# Patient Record
Sex: MALE | Race: WHITE | NOT HISPANIC OR LATINO | Employment: FULL TIME | URBAN - METROPOLITAN AREA
[De-identification: names, ages, dates, MRNs, and addresses within clinical notes are randomized per-mention and may not be internally consistent; named-entity substitution may affect disease eponyms.]

---

## 2018-01-04 ENCOUNTER — HOSPITAL ENCOUNTER (OUTPATIENT)
Dept: PREADMISSION TESTING | Facility: HOSPITAL | Age: 33
Discharge: HOME | End: 2018-01-04
Attending: PODIATRIST | Admitting: PODIATRIST

## 2018-01-04 LAB
ANION GAP SERPL CALC-SCNC: 12 MMOL/L (ref 10–20)
BASOPHILS # BLD AUTO: 0 10*3/UL (ref 0–0.2)
BASOPHILS NFR BLD AUTO: 1 % (ref 0–2)
BUN SERPL-MCNC: 20 MG/DL (ref 8–20)
BUN/CREAT SERPL: 18.2 (ref 6.2–20.3)
CALCIUM SERPL-MCNC: 9.6 MG/DL (ref 8.9–10.3)
CHLORIDE SERPL-SCNC: 103 MMOL/L (ref 101–111)
CONV CO2: 26 MMOL/L (ref 22–32)
CREAT UR-MCNC: 1.1 MG/DL (ref 0.7–1.2)
DIFFERENTIAL METHOD BLD: (no result)
EOSINOPHIL # BLD AUTO: 0.2 10*3/UL (ref 0–0.3)
EOSINOPHIL # BLD AUTO: 4 % (ref 0–3)
ERYTHROCYTE [DISTWIDTH] IN BLOOD BY AUTOMATED COUNT: 13.2 % (ref 11.5–14.5)
GLUCOSE SERPL-MCNC: 109 MG/DL (ref 65–99)
HCT VFR BLD AUTO: 43.8 % (ref 40–54)
HGB BLD-MCNC: 14.8 G/DL (ref 14–18)
LYMPHOCYTES # BLD AUTO: 2.8 10*3/UL (ref 0.8–4.8)
LYMPHOCYTES NFR BLD AUTO: 48 % (ref 18–42)
MCH RBC QN AUTO: 31.5 PG (ref 26–32)
MCHC RBC AUTO-ENTMCNC: 33.7 G/DL (ref 32–36)
MCV RBC AUTO: 93.5 FL (ref 80–94)
MONOCYTES # BLD AUTO: 0.6 10*3/UL (ref 0.1–1.3)
MONOCYTES NFR BLD AUTO: 10 % (ref 2–11)
NEUTROPHILS # BLD AUTO: 2.2 10*3/UL (ref 2.3–8.6)
NEUTROPHILS NFR BLD AUTO: 37 % (ref 50–75)
NRBC BLD AUTO-RTO: 0 /100{WBCS}
NRBC/RBC NFR BLD MANUAL: 0 10*3/UL
PLATELET # BLD AUTO: 244 10*3/UL (ref 150–450)
PMV BLD AUTO: 7.9 FL (ref 7.4–10.4)
POTASSIUM SERPL-SCNC: 4 MMOL/L (ref 3.6–5.1)
RBC # BLD AUTO: 4.69 10*6/UL (ref 4.6–6)
SODIUM SERPL-SCNC: 137 MMOL/L (ref 136–144)
WBC # BLD AUTO: 5.9 10*3/UL (ref 4.5–11.5)

## 2018-01-19 ENCOUNTER — HOSPITAL ENCOUNTER (OUTPATIENT)
Dept: OTHER | Facility: HOSPITAL | Age: 33
Setting detail: SPECIMEN
Discharge: HOME | End: 2018-01-19
Attending: PODIATRIST | Admitting: PODIATRIST

## 2018-03-12 ENCOUNTER — OFFICE (AMBULATORY)
Dept: URBAN - METROPOLITAN AREA CLINIC 64 | Facility: CLINIC | Age: 33
End: 2018-03-12

## 2018-03-12 VITALS
HEART RATE: 93 BPM | WEIGHT: 234 LBS | HEIGHT: 71 IN | DIASTOLIC BLOOD PRESSURE: 95 MMHG | SYSTOLIC BLOOD PRESSURE: 135 MMHG

## 2018-03-12 DIAGNOSIS — R13.10 DYSPHAGIA, UNSPECIFIED: ICD-10-CM

## 2018-03-12 DIAGNOSIS — K21.9 GASTRO-ESOPHAGEAL REFLUX DISEASE WITHOUT ESOPHAGITIS: ICD-10-CM

## 2018-03-12 PROCEDURE — 99203 OFFICE O/P NEW LOW 30 MIN: CPT | Performed by: INTERNAL MEDICINE

## 2018-03-15 ENCOUNTER — OFFICE (AMBULATORY)
Dept: URBAN - METROPOLITAN AREA PATHOLOGY 4 | Facility: PATHOLOGY | Age: 33
End: 2018-03-15

## 2018-03-15 ENCOUNTER — ON CAMPUS - OUTPATIENT (AMBULATORY)
Dept: URBAN - METROPOLITAN AREA HOSPITAL 2 | Facility: HOSPITAL | Age: 33
End: 2018-03-15

## 2018-03-15 VITALS
OXYGEN SATURATION: 97 % | DIASTOLIC BLOOD PRESSURE: 78 MMHG | SYSTOLIC BLOOD PRESSURE: 130 MMHG | OXYGEN SATURATION: 94 % | HEART RATE: 79 BPM | OXYGEN SATURATION: 100 % | HEART RATE: 87 BPM | HEART RATE: 103 BPM | TEMPERATURE: 98.3 F | HEART RATE: 77 BPM | OXYGEN SATURATION: 95 % | RESPIRATION RATE: 18 BRPM | SYSTOLIC BLOOD PRESSURE: 123 MMHG | HEART RATE: 96 BPM | DIASTOLIC BLOOD PRESSURE: 79 MMHG | WEIGHT: 234 LBS | DIASTOLIC BLOOD PRESSURE: 87 MMHG | SYSTOLIC BLOOD PRESSURE: 158 MMHG | DIASTOLIC BLOOD PRESSURE: 94 MMHG | HEART RATE: 98 BPM | SYSTOLIC BLOOD PRESSURE: 141 MMHG | SYSTOLIC BLOOD PRESSURE: 151 MMHG | RESPIRATION RATE: 16 BRPM | RESPIRATION RATE: 15 BRPM | DIASTOLIC BLOOD PRESSURE: 81 MMHG | SYSTOLIC BLOOD PRESSURE: 132 MMHG | HEIGHT: 71 IN

## 2018-03-15 DIAGNOSIS — R13.10 DYSPHAGIA, UNSPECIFIED: ICD-10-CM

## 2018-03-15 DIAGNOSIS — K44.9 DIAPHRAGMATIC HERNIA WITHOUT OBSTRUCTION OR GANGRENE: ICD-10-CM

## 2018-03-15 DIAGNOSIS — K21.0 GASTRO-ESOPHAGEAL REFLUX DISEASE WITH ESOPHAGITIS: ICD-10-CM

## 2018-03-15 DIAGNOSIS — D13.0 BENIGN NEOPLASM OF ESOPHAGUS: ICD-10-CM

## 2018-03-15 LAB
GI HISTOLOGY: A. UNSPECIFIED: (no result)
GI HISTOLOGY: PDF REPORT: (no result)

## 2018-03-15 PROCEDURE — 88305 TISSUE EXAM BY PATHOLOGIST: CPT | Performed by: INTERNAL MEDICINE

## 2018-03-15 PROCEDURE — 43450 DILATE ESOPHAGUS 1/MULT PASS: CPT | Performed by: INTERNAL MEDICINE

## 2018-03-15 PROCEDURE — 43239 EGD BIOPSY SINGLE/MULTIPLE: CPT | Performed by: INTERNAL MEDICINE

## 2018-03-15 RX ORDER — OMEPRAZOLE 40 MG/1
40 CAPSULE, DELAYED RELEASE ORAL
Qty: 180 | Refills: 4 | Status: ACTIVE
Start: 2018-03-15

## 2018-03-15 RX ADMIN — PROPOFOL: 10 INJECTION, EMULSION INTRAVENOUS at 13:30

## 2019-11-27 ENCOUNTER — OFFICE VISIT (OUTPATIENT)
Dept: SLEEP MEDICINE | Facility: HOSPITAL | Age: 34
End: 2019-11-27

## 2019-11-27 VITALS
WEIGHT: 240.6 LBS | OXYGEN SATURATION: 96 % | HEIGHT: 71 IN | DIASTOLIC BLOOD PRESSURE: 100 MMHG | SYSTOLIC BLOOD PRESSURE: 148 MMHG | BODY MASS INDEX: 33.68 KG/M2 | HEART RATE: 90 BPM

## 2019-11-27 DIAGNOSIS — G47.33 OBSTRUCTIVE SLEEP APNEA, ADULT: Primary | ICD-10-CM

## 2019-11-27 PROCEDURE — G0463 HOSPITAL OUTPT CLINIC VISIT: HCPCS

## 2019-11-27 RX ORDER — OMEPRAZOLE 40 MG/1
40 CAPSULE, DELAYED RELEASE ORAL DAILY PRN
COMMUNITY

## 2019-11-27 NOTE — PROGRESS NOTES
SLEEP MEDICINE CONSULT      Patient Identification:     Name: Caleb Vargas   Age: 34 y.o.   Gender: male   : 1985   MRN: [id number]     Date of consult: 2019    PCP/Referring Physician(s):     PCP: Eduar Rojas  Referring Provider: Minerva Hobson MD      Chief Complaint:   Obstructive sleep apnea.  Remains symptomatic.    History of Present Illness:   This is a very pleasant gentleman who was diagnosed with obstructive sleep apnea after undergoing an unattended polysomnogram some 2 years ago.  Struct of sleep apnea was diagnosed.  He was given a CPAP set at 12 cm of CPAP pressure.  He found the pressure intolerable and gave it up.  He has remained symptomatic.  He has continued to gain weight.  He has felt tired and somnolent during the daytime.  He has felt exhausted at the end of the day.  He tried mouthguard to decrease his nocturnal symptoms.  He found it very ineffective.  He discussed his symptoms with his primary care physician.  He has been referred to sleep center for further management    He used a full facemask which he did not like.  The pressures were too high for his liking.  He was evaluated by ENT in [place].  Deviated nasal septum was diagnosed.  Surgery was offered.    He works night shift 3 nights per week.  His bedtime is usually between 8 AM to 9 AM.  He dozes off within 15 minutes.  Usually wakes up at 3:30 PM to start his day.  He has woken up 3 or 4 times a bathroom break.  He has often woken up with a dry mouth.  He has woken up with his loud snores.  Takes medications for heartburns.  He finds it difficult to breathe through his nose.  He has severe nasal congestion upon awakening.    During his off days which is from Monday to Thursday he goes to bed between 11 PM to 3 AM.  Dozes off within 15 minutes.  Sometimes may take him a longer time.  Not more than 35 minutes.  His sleep remains disturbed.  He continues to wake up throughout the night.  He has sometimes woken up  with shortness of breath.  His loud snores and sometimes disturbs his sleep.  He has not read in his sleep.  He has woken up fighting for air.  Dry mouth has bothered him.  He is a mouth breather.  Sometimes headaches in the morning.  He denies any symptoms of heartburn after he started taking medications for reflux disease.  No symptoms of palpitations at night or in the morning.    He has woken up in the morning tired and somnolent.  He has remained tired throughout the day.  He likes to have Mountain Dew 1 or 2 cans during the daytime.  Or at night when working.  He has dozed off during sedentary activities.  His [place] sleepiness score is 12/24    Allergies, Medications, and Past History:   Allergies:    Allergies   Allergen Reactions   • Latex Rash     Current Medications:    Prior to Admission medications    Medication Sig Start Date End Date Taking? Authorizing Provider   omeprazole (priLOSEC) 20 MG capsule Take 20 mg by mouth Daily.   Yes Provider, MD Cedric     Past Medical History:    Past Medical History:   Diagnosis Date   • Abdominal pain    • Agitation    • Allergies    • Apnea    • Change in weight    • Chest congestion    • Choking    • Cough    • GERD (gastroesophageal reflux disease)    • High blood pressure    • Obesity    • Sleep disturbance    • Snoring    • Wheezing       Past Surgical History:    Past Surgical History:   Procedure Laterality Date   • CLEFT PALATE REPAIR     • SINUS SURGERY     • TONSILLECTOMY        Social History:    Social History     Tobacco Use   • Smoking status: Current Every Day Smoker   • Smokeless tobacco: Never Used   Substance Use Topics   • Alcohol use: Not on file   • Drug use: Not on file     Family Medical History:  History reviewed. No pertinent family history.      Review of Systems:   Constitutional:  Denies fever or chills.  He continues to gain weight .  He has gained 70 pounds in last 5 years.  Eyes:  Denies change in visual acuity   HENT:   He has  "nasal congestion, sore throat or post nasal drainage he has deviated nasal septum.  He has never been tested for allergies.  Respiratory:  Denies cough, shortness of breath .  He experiences dyspnea on exertion    Cardiovascular:  Denies chest pain or edema   GI:  Denies abdominal pain, nausea, vomiting, bloody stools or diarrhea   :  Denies dysuria or nocturia   Musculoskeletal:  Denies back pain or joint pain   Integument:  Denies rash   Neurologic:  Denies headache, focal weakness or sensory changes. No history of stroke or seizures.   Endocrine:  Denies polyuria or polydipsia   Lymphatic:  Denies swollen glands   Psychiatric:  Denies depression, anxiety or claustrophobia      Physical Exam:     Vitals:    11/27/19 1142   BP: 148/100   Pulse: 90   SpO2: 96%   Weight: 109 kg (240 lb 9.6 oz)   Height: 180.3 cm (71\")     HEENT: Head is normocephalic, atraumatic, normal distribution of hair. Pupils reactive to light. Ocular movements intact.  Moderate nasal congestion on sniff test. He has deviated nasal septum. No micrognathia.  Throat: Mallapatti stage 4, tongue midline, mucosa moist.  Neck: no JVD, thyromegaly, mass, +midline trachea, Neck circumference 16.5 inches  Lungs: clear, no wheeze, rhonchi, crackles  Cardiovascular: S1, S2, RRR no murmurs, rubs or gallops  Abd: +BS's soft NT/ND, no CVA tenderness.  Obese.   Ext: no edema, cyanosis, clubbing, pulses intact  Neuro: Awake alert, oriented to time place and person. Grossly intact to motor, sensory cerebral, and cerebellar (without focal deficit)  Psych: No overt melanie, depression, psychosis or inappropriate behavior  Skin: No rash, cellulitis, or ulcerations.  No facial rash or erosions      Assessment/Plan:   Obstructive sleep apnea:  This is a very pleasant gentleman who has been diagnosed with obstructive sleep apnea after an unattended polysomnogram.  He was unable to tolerate a CPAP mode of therapy.  He has gained weight in the meantime.  He has become " more symptomatic.  Recommendation.  a split-night polysomnogram is recommended.  Allergic rhinitis:  He remains symptomatic.  He has deviated nasal septum.  Is a mouth breather  Recommendation.  Aggressive therapy for upper airway congestion and deviated nasal septum is recommended.  He may benefit from ENT evaluation.  Allergy testing may be helpful.  Obesity:  Based on BMI of 33.6.  Recommendation.  Weight loss is recommended.  Shift work associated with insufficient sleep syndrome.  He is advised to sleep at least 7 to 8 hours per major sleep.  He is advised to fix his bedtime as well as rise time on his days off.  He is advised to take a short nap prior to his shift.  He is advised to avoid all alcoholic beverages or caffeinated beverages 4 to 6 hours prior to bedtime.  Driving instructions. Patient is advised to avoid driving if tired or somnolent. To avoid all alcoholic beverages or medications causing somnolence.         Diagnosis Plan   1. Obstructive sleep apnea, adult  Polysomnography 4 or More Parameters     No orders of the defined types were placed in this encounter.    Orders Placed This Encounter   Procedures   • Polysomnography 4 or More Parameters     Hx. Of REGGIE   SEVERE NASAL CONGESTION.  REQUIRES SPLIT NIGHT.     Standing Status:   Future     Standing Expiration Date:   11/27/2020     Order Specific Question:   May take own meds     Answer:   Yes     Order Specific Question:   Details     Answer:   O2 Implementation per Protocol       This document has been electronically signed by:  Minerva Hobson MD  11/27/2019  12:45 PM

## 2019-12-18 ENCOUNTER — HOSPITAL ENCOUNTER (OUTPATIENT)
Dept: SLEEP MEDICINE | Facility: HOSPITAL | Age: 34
Discharge: HOME | End: 2019-12-18
Admitting: INTERNAL MEDICINE

## 2019-12-18 DIAGNOSIS — G47.33 OBSTRUCTIVE SLEEP APNEA, ADULT: ICD-10-CM

## 2019-12-18 PROCEDURE — 95811 POLYSOM 6/>YRS CPAP 4/> PARM: CPT

## 2019-12-19 DIAGNOSIS — G47.33 OBSTRUCTIVE SLEEP APNEA, ADULT: Primary | ICD-10-CM

## 2019-12-28 ENCOUNTER — HOSPITAL ENCOUNTER (EMERGENCY)
Facility: HOSPITAL | Age: 34
Discharge: HOME | End: 2019-12-28
Admitting: EMERGENCY MEDICINE

## 2019-12-28 ENCOUNTER — APPOINTMENT (OUTPATIENT)
Dept: GENERAL RADIOLOGY | Facility: HOSPITAL | Age: 34
End: 2019-12-28

## 2019-12-28 VITALS
WEIGHT: 236.55 LBS | BODY MASS INDEX: 33.12 KG/M2 | HEART RATE: 98 BPM | TEMPERATURE: 97.9 F | SYSTOLIC BLOOD PRESSURE: 136 MMHG | DIASTOLIC BLOOD PRESSURE: 78 MMHG | HEIGHT: 71 IN | OXYGEN SATURATION: 99 % | RESPIRATION RATE: 18 BRPM

## 2019-12-28 DIAGNOSIS — J10.1 INFLUENZA A: Primary | ICD-10-CM

## 2019-12-28 DIAGNOSIS — R05.9 COUGH: ICD-10-CM

## 2019-12-28 DIAGNOSIS — R50.9 FEVER, UNSPECIFIED FEVER CAUSE: ICD-10-CM

## 2019-12-28 LAB
ALBUMIN SERPL-MCNC: 4.3 G/DL (ref 3.5–5.2)
ALBUMIN/GLOB SERPL: 1.2 G/DL
ALP SERPL-CCNC: 77 U/L (ref 39–117)
ALT SERPL W P-5'-P-CCNC: 51 U/L (ref 1–41)
ANION GAP SERPL CALCULATED.3IONS-SCNC: 12 MMOL/L (ref 5–15)
AST SERPL-CCNC: 30 U/L (ref 1–40)
BASOPHILS # BLD AUTO: 0 10*3/MM3 (ref 0–0.2)
BASOPHILS NFR BLD AUTO: 0.4 % (ref 0–1.5)
BILIRUB SERPL-MCNC: 0.3 MG/DL (ref 0.2–1.2)
BUN BLD-MCNC: 15 MG/DL (ref 6–20)
BUN/CREAT SERPL: 15.6 (ref 7–25)
CALCIUM SPEC-SCNC: 8.9 MG/DL (ref 8.6–10.5)
CHLORIDE SERPL-SCNC: 101 MMOL/L (ref 98–107)
CO2 SERPL-SCNC: 23 MMOL/L (ref 22–29)
CREAT BLD-MCNC: 0.96 MG/DL (ref 0.76–1.27)
D DIMER PPP FEU-MCNC: 0.27 MCGFEU/ML (ref 0.17–0.59)
DEPRECATED RDW RBC AUTO: 42 FL (ref 37–54)
EOSINOPHIL # BLD AUTO: 0 10*3/MM3 (ref 0–0.4)
EOSINOPHIL NFR BLD AUTO: 0.3 % (ref 0.3–6.2)
ERYTHROCYTE [DISTWIDTH] IN BLOOD BY AUTOMATED COUNT: 13.2 % (ref 12.3–15.4)
FLUAV SUBTYP SPEC NAA+PROBE: DETECTED
FLUBV RNA ISLT QL NAA+PROBE: NOT DETECTED
GFR SERPL CREATININE-BSD FRML MDRD: 89 ML/MIN/1.73
GLOBULIN UR ELPH-MCNC: 3.6 GM/DL
GLUCOSE BLD-MCNC: 118 MG/DL (ref 65–99)
HCT VFR BLD AUTO: 45.1 % (ref 37.5–51)
HGB BLD-MCNC: 15.6 G/DL (ref 13–17.7)
LYMPHOCYTES # BLD AUTO: 0.9 10*3/MM3 (ref 0.7–3.1)
LYMPHOCYTES NFR BLD AUTO: 18 % (ref 19.6–45.3)
MCH RBC QN AUTO: 31.3 PG (ref 26.6–33)
MCHC RBC AUTO-ENTMCNC: 34.6 G/DL (ref 31.5–35.7)
MCV RBC AUTO: 90.4 FL (ref 79–97)
MONOCYTES # BLD AUTO: 0.9 10*3/MM3 (ref 0.1–0.9)
MONOCYTES NFR BLD AUTO: 17.6 % (ref 5–12)
NEUTROPHILS # BLD AUTO: 3.3 10*3/MM3 (ref 1.7–7)
NEUTROPHILS NFR BLD AUTO: 63.7 % (ref 42.7–76)
NRBC BLD AUTO-RTO: 0 /100 WBC (ref 0–0.2)
NT-PROBNP SERPL-MCNC: <5 PG/ML (ref 5–450)
PLATELET # BLD AUTO: 211 10*3/MM3 (ref 140–450)
PMV BLD AUTO: 7.2 FL (ref 6–12)
POTASSIUM BLD-SCNC: 4.3 MMOL/L (ref 3.5–5.2)
PROT SERPL-MCNC: 7.9 G/DL (ref 6–8.5)
RBC # BLD AUTO: 4.99 10*6/MM3 (ref 4.14–5.8)
SODIUM BLD-SCNC: 136 MMOL/L (ref 136–145)
TROPONIN T SERPL-MCNC: <0.01 NG/ML (ref 0–0.03)
WBC NRBC COR # BLD: 5.2 10*3/MM3 (ref 3.4–10.8)

## 2019-12-28 PROCEDURE — 85379 FIBRIN DEGRADATION QUANT: CPT | Performed by: PHYSICIAN ASSISTANT

## 2019-12-28 PROCEDURE — 85025 COMPLETE CBC W/AUTO DIFF WBC: CPT | Performed by: PHYSICIAN ASSISTANT

## 2019-12-28 PROCEDURE — 87502 INFLUENZA DNA AMP PROBE: CPT | Performed by: PHYSICIAN ASSISTANT

## 2019-12-28 PROCEDURE — 99284 EMERGENCY DEPT VISIT MOD MDM: CPT

## 2019-12-28 PROCEDURE — 83880 ASSAY OF NATRIURETIC PEPTIDE: CPT | Performed by: PHYSICIAN ASSISTANT

## 2019-12-28 PROCEDURE — 84484 ASSAY OF TROPONIN QUANT: CPT | Performed by: PHYSICIAN ASSISTANT

## 2019-12-28 PROCEDURE — 80053 COMPREHEN METABOLIC PANEL: CPT | Performed by: PHYSICIAN ASSISTANT

## 2019-12-28 PROCEDURE — 93005 ELECTROCARDIOGRAM TRACING: CPT | Performed by: EMERGENCY MEDICINE

## 2019-12-28 PROCEDURE — 71046 X-RAY EXAM CHEST 2 VIEWS: CPT

## 2019-12-28 PROCEDURE — 94640 AIRWAY INHALATION TREATMENT: CPT

## 2019-12-28 RX ORDER — IPRATROPIUM BROMIDE AND ALBUTEROL SULFATE 2.5; .5 MG/3ML; MG/3ML
3 SOLUTION RESPIRATORY (INHALATION) ONCE
Status: COMPLETED | OUTPATIENT
Start: 2019-12-28 | End: 2019-12-28

## 2019-12-28 RX ORDER — SODIUM CHLORIDE 0.9 % (FLUSH) 0.9 %
10 SYRINGE (ML) INJECTION AS NEEDED
Status: DISCONTINUED | OUTPATIENT
Start: 2019-12-28 | End: 2019-12-28 | Stop reason: HOSPADM

## 2019-12-28 RX ORDER — ALBUTEROL SULFATE 90 UG/1
2 AEROSOL, METERED RESPIRATORY (INHALATION) EVERY 4 HOURS PRN
Qty: 1 INHALER | Refills: 0 | Status: ON HOLD | OUTPATIENT
Start: 2019-12-28 | End: 2022-05-28

## 2019-12-28 RX ORDER — BENZONATATE 200 MG/1
200 CAPSULE ORAL 3 TIMES DAILY PRN
Qty: 30 CAPSULE | Refills: 0 | Status: SHIPPED | OUTPATIENT
Start: 2019-12-28 | End: 2020-11-20

## 2019-12-28 RX ORDER — BROMPHENIRAMINE MALEATE, PSEUDOEPHEDRINE HYDROCHLORIDE, AND DEXTROMETHORPHAN HYDROBROMIDE 2; 30; 10 MG/5ML; MG/5ML; MG/5ML
5 SYRUP ORAL 4 TIMES DAILY PRN
Qty: 473 ML | Refills: 0 | Status: SHIPPED | OUTPATIENT
Start: 2019-12-28 | End: 2020-11-20

## 2019-12-28 RX ADMIN — IPRATROPIUM BROMIDE AND ALBUTEROL SULFATE 3 ML: .5; 3 SOLUTION RESPIRATORY (INHALATION) at 13:30

## 2020-03-11 ENCOUNTER — OFFICE VISIT (OUTPATIENT)
Dept: SLEEP MEDICINE | Facility: HOSPITAL | Age: 35
End: 2020-03-11

## 2020-03-11 VITALS
SYSTOLIC BLOOD PRESSURE: 144 MMHG | HEART RATE: 104 BPM | BODY MASS INDEX: 33.6 KG/M2 | HEIGHT: 71 IN | DIASTOLIC BLOOD PRESSURE: 95 MMHG | WEIGHT: 240 LBS | OXYGEN SATURATION: 96 %

## 2020-03-11 DIAGNOSIS — G47.33 OBSTRUCTIVE SLEEP APNEA, ADULT: Primary | ICD-10-CM

## 2020-03-11 PROCEDURE — G0463 HOSPITAL OUTPT CLINIC VISIT: HCPCS

## 2020-03-11 NOTE — PROGRESS NOTES
SLEEP MEDICINE CONSULT      Patient Identification:     Name: Caleb Vargas   Age: 34 y.o.   Gender: male   : 1985   MRN: [id number]     Date of consult: 3/11/2020    PCP/Referring Physician(s):     PCP: Faraz Lemus NP-C  Referring Provider: Minerva Hobson MD      Chief Complaint:   Obstructive sleep apnea.  10 weeks follow up for compliance.    History of Present Illness:   This is a very pleasant gentleman who was diagnosed with obstructive sleep apnea after  an unattended polysomnogram some 2 years ago.  Obsttructive of sleep apnea was diagnosed.  He was given a CPAP set at 12 cm of CPAP pressure.  He found the pressure intolerable and gave it up.  He has remained symptomatic.  He has continued to gain weight.    A  split night polysomnogram was done.  Severe sleep apnea was diagnosed associated with severe hypoxemia.  Respiratory events were controlled and given pressures of CPAP.  He was given a full facemask with a CPAP pressure set at 11 CWP.  He is here today for initial follow-up for compliance.  Memory card has been downloaded.    He used a full facemask Nicole view which he finds quite uncomfortable.  He has pulled the mask off in his sleep.  The pressures were too high for his comfort  He has used the humidifier on regular basis..    He works night shift 3 nights per week.  His bedtime is usually between 8 AM to 9 AM.  He dozes off within 15 minutes.  Usually wakes up at 3:30 PM to start his day.  He has woken up 3 or 4 times a bathroom break.  He has often woken up with a dry mouth.  He has woken up with his loud snores.  Takes medications for heartburns.  He finds it difficult to breathe through his nose.  He has severe nasal congestion upon awakening.    During his off days which is from Monday to Thursday he goes to bed between 11 PM to 3 AM.  Dozes off within 15 minutes or  longer time.  Not more than 35 minutes.  His sleep remains disturbed.  He continues to wake up throughout the  night.  He has sometimes woken up with shortness of breath.  His loud snores and sometimes disturbs his sleep.    He has woken up fighting for air.  Dry mouth has bothered him.  He is a mouth breather.  He has pulled his mask off in his sleep.  Sometimes headaches in the morning.  He denies any symptoms of heartburn after he started taking medications for reflux disease.  No symptoms of palpitations at night or in the morning.    He has woken up in the morning tired and somnolent.  He has remained tired throughout the day.  He likes to have Mountain Dew 1 or 2 cans during the daytime.  Or at night when working.  He has dozed off during sedentary activities.  His [place] sleepiness score is 12/24    Allergies, Medications, and Past History:   Allergies:    Allergies   Allergen Reactions   • Latex Hives   • Latex Rash     Current Medications:    Prior to Admission medications    Medication Sig Start Date End Date Taking? Authorizing Provider   omeprazole (priLOSEC) 20 MG capsule Take 20 mg by mouth Daily.   Yes Provider, MD Cedric     Past Medical History:    Past Medical History:   Diagnosis Date   • Abdominal pain    • Agitation    • Allergies    • Apnea    • Change in weight    • Chest congestion    • Choking    • Cough    • GERD (gastroesophageal reflux disease)    • High blood pressure    • Obesity    • Sleep disturbance    • Snoring    • Wheezing       Past Surgical History:    Past Surgical History:   Procedure Laterality Date   • CLEFT PALATE REPAIR     • SINUS SURGERY     • TONSILLECTOMY        Social History:    Social History     Tobacco Use   • Smoking status: Current Every Day Smoker   • Smokeless tobacco: Never Used   Substance Use Topics   • Alcohol use: Not on file   • Drug use: Not on file     Family Medical History:  History reviewed. No pertinent family history.      Review of Systems:   Constitutional:  Denies fever or chills.  He continues to gain weight .  He has gained 70 pounds in last 5  "years.  Eyes:  Denies change in visual acuity   HENT:   He has nasal congestion, sore throat or post nasal drainage he has deviated nasal septum.  He has never been tested for allergies.  He was operated upon at the age of 12.  A metal plate was placed over his maxillary bone.  He has deviated nasal septum.  He was offered surgical correction in [place] several years ago.  He has refused.  Respiratory:  Denies cough, shortness of breath .  He experiences dyspnea on exertion    Cardiovascular:  Denies chest pain or edema   GI:  Denies abdominal pain, nausea, vomiting, bloody stools or diarrhea .  No aerophagia.  :  Denies dysuria or nocturia   Musculoskeletal:  Denies back pain or joint pain   Integument:  Denies rash   Neurologic:  Denies headache, focal weakness or sensory changes. No history of stroke or seizures.   Endocrine:  Denies polyuria or polydipsia   Lymphatic:  Denies swollen glands   Psychiatric:  Denies depression, anxiety or claustrophobia      Physical Exam:     Vitals:    03/11/20 1332   BP: 144/95   Pulse: 104   SpO2: 96%   Weight: 109 kg (240 lb)   Height: 180.3 cm (71\")     HEENT: Head is normocephalic, atraumatic, normal distribution of hair. Pupils reactive to light. Ocular movements intact.  Moderate nasal congestion on sniff test. He has deviated nasal septum. No micrognathia.  Throat: Mallapatti stage 4, tongue midline, mucosa moist.  Neck: no JVD, thyromegaly, mass, +midline trachea, Neck circumference 16.5 inches  Lungs: clear, no wheeze, rhonchi, crackles  Cardiovascular: S1, S2, RRR no murmurs, rubs or gallops  Abd: +BS's soft NT/ND, no CVA tenderness.  Obese.   Ext: no edema, cyanosis, clubbing, pulses intact  Neuro: Awake alert, oriented to time place and person. Grossly intact to motor, sensory cerebral, and cerebellar (without focal deficit)  Psych: No overt melanie, depression, psychosis or inappropriate behavior  Skin: No rash, cellulitis, or ulcerations.  No facial rash or " erosions    Diagnostic data;  1.  I split-night polysomnogram was done on 12/18/2020.  The diagnostic portion of the study showed an AHI of 72.4 events per hour.  RDI of 74.8 events per hour.  Oxygen saturation monitoring shows a mean oxygen saturation of 93%.  Minimum oxygen saturation of 73%.  The treatment portion of the study showed an AHI of 3.9 events per hour.  RDI of 4.4 events per hour.  Oxygen saturation showed a mean saturation of 96%.  Minimum oxygen saturation of 90%.  Perineal movements were noted with an index of 34.9 events per hour.  Respiratory events were controlled at CPAP mode of therapy with a pressure at 11 CWP.  2.  Memory card download shows data from 12/26/2019 to 3/10/2020.  Overall 76 days of data reviewed.  66 days without  use.  Percentage of use 13.2%.  Maximum hours used 2 hours 56 minutes.  Minimum time use 1 minute.  100% of the time the mask has been used for less than 4 hours.  The average apnea hypopnea index is 12.9 events per hour at 11 CWP.  Assessment/Plan:   Obstructive sleep apnea:  This is a very pleasant gentleman who has been diagnosed with obstructive sleep apnea after an unattended polysomnogram.  He was unable to tolerate a CPAP mode of therapy.   Repeat split-night polysomnogram was done which showed presence of severe obstructive sleep apnea associated with severe hypoxemia.  He was provided with a CPAP mode of therapy set at 11 CWP with a full facemask.      He has found it difficult to use the mask for longer.  Of time.  He has pulled the mask off in his sleep.  He is a mouth breather.  He finds the pressure quite uncomfortable.  He has not gotten any benefit from it.    The results of memory card download was discussed in detail with the patient all questions were answered.    Different options of masks discussed.  He would prefer oral mask.  He was shown a FISSURE PYKEL oracle  mask.  He would like to give it a try.  Recommendation.  [facility]   [place]  oral mask.  To decrease the CPAP  pressure to 8 CWP.  He is advised to continue using the mask with given pressures on a nightly basis.  Is advised to use the mask with given pressure for at least 4 hours a minimum benefit or as long as he sleeps on maximum benefit.  Is advised to use the mask with given pressure during the daytime if he takes a nap.  Allergic rhinitis:  He remains symptomatic.  He has deviated nasal septum.  Is a mouth breather.  He has a metal plate for maxillary bone.  As per patient it has burrowed into his sinuses.  Recommendation.  Aggressive therapy for upper airway congestion and deviated nasal septum is recommended.  He may benefit from ENT evaluation.  Allergy testing may be helpful.  Obesity:  Based on BMI of 33.6.  Recommendation.  Weight loss is recommended.  Shift work associated with insufficient sleep syndrome.  He is advised to sleep at least 7 to 8 hours per major sleep.  He is advised to fix his bedtime as well as rise time on his days off.  He is advised to take a short nap prior to his shift.  He is advised to avoid all alcoholic beverages or caffeinated beverages 4 to 6 hours prior to bedtime.  Driving instructions. Patient is advised to avoid driving if tired or somnolent. To avoid all alcoholic beverages or medications causing somnolence.         Diagnosis Plan   1. Obstructive sleep apnea, adult  CPAP Therapy     No orders of the defined types were placed in this encounter.    Orders Placed This Encounter   Procedures   • CPAP Therapy     PLEASE PROVIDE AN ORAL MASK ONLY.  F&P  ORAL  ORAL MASK.     Order Specific Question:   Equipment:     Answer:    CPAP     Order Specific Question:   Pressure (cmH20)     Answer:   8     Order Specific Question:   Ramp:     Answer:   Yes     Order Specific Question:   Minutes:     Answer:   20     Order Specific Question:   Pressure (cmH20):     Answer:   4     Order Specific Question:   PAP Tubing (1 per 3 months)      Answer:    6' Tubing with integrated heating element     Order Specific Question:   PAP Mask (1 per 3 months)     Answer:   Mask Fitting with DME Company     Order Specific Question:   PAP Accessories     Answer:    Water Chamber for PAP Humidifier (1 per 6 month) &  Filter PAP Disposable (2 per month) &  Filter PAP Non-Disposable (1 per 6 months) &  Chinstrap to be used with PAP (1 per 6 months) &  Headgear to be used with PAP (1 per 6 mo)     Order Specific Question:   The prescribed settings for the PAP ordered are     Answer:   to decrease CPAP pressure to 8 cwp for acclimitization.     Order Specific Question:   Does the patient have Obstructive Sleep Apnea or other qualifying diagnosis for PAP ordered?     Answer:   Yes     Order Specific Question:   Does the patient require humidification?     Answer:   Yes     Order Specific Question:   Humidifier     Answer:    Humidifier Heated for CPAP or BiPAP     Order Specific Question:   If ordering a BiPAP for REGGIE a CPAP has been tried and/or ruled out?     Answer:   Does not apply     Order Specific Question:   The patient requires a PAP Device & continued use of Supplies to administer effective PAP therapy at the frequency of use ordered above     Answer:   Yes     Order Specific Question:   Initial Supplies and refills authorized for 12 months?     Answer:   Yes     Order Specific Question:   The face to face evaluation was performed on     Answer:   3/11/2020     Order Specific Question:   Which DME company is this being sent to?     Answer:   [facility] [[id number]]     Order Specific Question:   Length of Need (99 Months = Lifetime)     Answer:   99 Months = Lifetime       This document has been electronically signed by:  Minerva Hobson MD  3/11/2020  14:34

## 2020-04-27 ENCOUNTER — TRANSCRIBE ORDERS (OUTPATIENT)
Dept: LAB | Facility: HOSPITAL | Age: 35
End: 2020-04-27

## 2020-04-27 ENCOUNTER — LAB (OUTPATIENT)
Dept: LAB | Facility: HOSPITAL | Age: 35
End: 2020-04-27

## 2020-04-27 DIAGNOSIS — Z01.818 PRE-OP EXAMINATION: Primary | ICD-10-CM

## 2020-04-27 DIAGNOSIS — Z01.818 PRE-OP EXAMINATION: ICD-10-CM

## 2020-04-27 LAB
ANION GAP SERPL CALCULATED.3IONS-SCNC: 14.6 MMOL/L (ref 5–15)
BASOPHILS # BLD AUTO: 0.03 10*3/MM3 (ref 0–0.2)
BASOPHILS NFR BLD AUTO: 0.3 % (ref 0–1.5)
BUN BLD-MCNC: 20 MG/DL (ref 6–20)
BUN/CREAT SERPL: 23.3 (ref 7–25)
CALCIUM SPEC-SCNC: 9.3 MG/DL (ref 8.6–10.5)
CHLORIDE SERPL-SCNC: 101 MMOL/L (ref 98–107)
CO2 SERPL-SCNC: 23.4 MMOL/L (ref 22–29)
CREAT BLD-MCNC: 0.86 MG/DL (ref 0.76–1.27)
DEPRECATED RDW RBC AUTO: 42.9 FL (ref 37–54)
EOSINOPHIL # BLD AUTO: 0.06 10*3/MM3 (ref 0–0.4)
EOSINOPHIL NFR BLD AUTO: 0.6 % (ref 0.3–6.2)
ERYTHROCYTE [DISTWIDTH] IN BLOOD BY AUTOMATED COUNT: 12.9 % (ref 12.3–15.4)
GFR SERPL CREATININE-BSD FRML MDRD: 101 ML/MIN/1.73
GLUCOSE BLD-MCNC: 136 MG/DL (ref 65–99)
HCT VFR BLD AUTO: 42.5 % (ref 37.5–51)
HGB BLD-MCNC: 14.5 G/DL (ref 13–17.7)
IMM GRANULOCYTES # BLD AUTO: 0.04 10*3/MM3 (ref 0–0.05)
IMM GRANULOCYTES NFR BLD AUTO: 0.4 % (ref 0–0.5)
LYMPHOCYTES # BLD AUTO: 4.16 10*3/MM3 (ref 0.7–3.1)
LYMPHOCYTES NFR BLD AUTO: 39.9 % (ref 19.6–45.3)
MCH RBC QN AUTO: 31.2 PG (ref 26.6–33)
MCHC RBC AUTO-ENTMCNC: 34.1 G/DL (ref 31.5–35.7)
MCV RBC AUTO: 91.4 FL (ref 79–97)
MONOCYTES # BLD AUTO: 0.95 10*3/MM3 (ref 0.1–0.9)
MONOCYTES NFR BLD AUTO: 9.1 % (ref 5–12)
NEUTROPHILS # BLD AUTO: 5.19 10*3/MM3 (ref 1.7–7)
NEUTROPHILS NFR BLD AUTO: 49.7 % (ref 42.7–76)
NRBC BLD AUTO-RTO: 0 /100 WBC (ref 0–0.2)
PLATELET # BLD AUTO: 300 10*3/MM3 (ref 140–450)
PMV BLD AUTO: 10.3 FL (ref 6–12)
POTASSIUM BLD-SCNC: 3.9 MMOL/L (ref 3.5–5.2)
RBC # BLD AUTO: 4.65 10*6/MM3 (ref 4.14–5.8)
SODIUM BLD-SCNC: 139 MMOL/L (ref 136–145)
WBC NRBC COR # BLD: 10.43 10*3/MM3 (ref 3.4–10.8)

## 2020-04-27 PROCEDURE — 80048 BASIC METABOLIC PNL TOTAL CA: CPT

## 2020-04-27 PROCEDURE — 36415 COLL VENOUS BLD VENIPUNCTURE: CPT

## 2020-04-27 PROCEDURE — 85025 COMPLETE CBC W/AUTO DIFF WBC: CPT

## 2020-05-05 ENCOUNTER — LAB REQUISITION (OUTPATIENT)
Dept: LAB | Facility: HOSPITAL | Age: 35
End: 2020-05-05

## 2020-05-05 DIAGNOSIS — B07.0 PLANTAR WART: ICD-10-CM

## 2020-05-05 DIAGNOSIS — M79.671 PAIN IN RIGHT FOOT: ICD-10-CM

## 2020-05-05 PROCEDURE — 88305 TISSUE EXAM BY PATHOLOGIST: CPT | Performed by: PODIATRIST

## 2020-05-06 LAB
LAB AP CASE REPORT: NORMAL
PATH REPORT.FINAL DX SPEC: NORMAL
PATH REPORT.GROSS SPEC: NORMAL

## 2020-06-10 ENCOUNTER — APPOINTMENT (OUTPATIENT)
Dept: SLEEP MEDICINE | Facility: HOSPITAL | Age: 35
End: 2020-06-10

## 2020-10-14 DIAGNOSIS — G47.33 OBSTRUCTIVE SLEEP APNEA, ADULT: Primary | ICD-10-CM

## 2020-11-19 ENCOUNTER — HOSPITAL ENCOUNTER (OUTPATIENT)
Facility: HOSPITAL | Age: 35
Setting detail: OBSERVATION
Discharge: HOME | End: 2020-11-20
Attending: EMERGENCY MEDICINE | Admitting: INTERNAL MEDICINE

## 2020-11-19 DIAGNOSIS — R07.9 CHEST PAIN, UNSPECIFIED TYPE: Primary | ICD-10-CM

## 2020-11-19 PROCEDURE — 84484 ASSAY OF TROPONIN QUANT: CPT | Performed by: EMERGENCY MEDICINE

## 2020-11-19 PROCEDURE — 85610 PROTHROMBIN TIME: CPT | Performed by: EMERGENCY MEDICINE

## 2020-11-19 PROCEDURE — 85379 FIBRIN DEGRADATION QUANT: CPT | Performed by: EMERGENCY MEDICINE

## 2020-11-19 PROCEDURE — 93005 ELECTROCARDIOGRAM TRACING: CPT | Performed by: EMERGENCY MEDICINE

## 2020-11-19 PROCEDURE — 85025 COMPLETE CBC W/AUTO DIFF WBC: CPT | Performed by: EMERGENCY MEDICINE

## 2020-11-19 PROCEDURE — 99284 EMERGENCY DEPT VISIT MOD MDM: CPT

## 2020-11-19 PROCEDURE — 80053 COMPREHEN METABOLIC PANEL: CPT | Performed by: EMERGENCY MEDICINE

## 2020-11-19 PROCEDURE — 85730 THROMBOPLASTIN TIME PARTIAL: CPT | Performed by: EMERGENCY MEDICINE

## 2020-11-20 ENCOUNTER — APPOINTMENT (OUTPATIENT)
Dept: NUCLEAR MEDICINE | Facility: HOSPITAL | Age: 35
End: 2020-11-20

## 2020-11-20 ENCOUNTER — APPOINTMENT (OUTPATIENT)
Dept: GENERAL RADIOLOGY | Facility: HOSPITAL | Age: 35
End: 2020-11-20

## 2020-11-20 VITALS
HEART RATE: 80 BPM | SYSTOLIC BLOOD PRESSURE: 139 MMHG | OXYGEN SATURATION: 95 % | WEIGHT: 237.6 LBS | HEIGHT: 71 IN | TEMPERATURE: 97.8 F | DIASTOLIC BLOOD PRESSURE: 100 MMHG | BODY MASS INDEX: 33.26 KG/M2 | RESPIRATION RATE: 20 BRPM

## 2020-11-20 PROBLEM — E78.49 OTHER HYPERLIPIDEMIA: Chronic | Status: ACTIVE | Noted: 2020-11-20

## 2020-11-20 PROBLEM — E66.9 OBESITY (BMI 30-39.9): Chronic | Status: ACTIVE | Noted: 2020-11-20

## 2020-11-20 PROBLEM — R07.9 CHEST PAIN: Status: ACTIVE | Noted: 2020-11-20

## 2020-11-20 PROBLEM — F17.210 TOBACCO DEPENDENCE DUE TO CIGARETTES: Chronic | Status: ACTIVE | Noted: 2020-11-20

## 2020-11-20 PROBLEM — J41.0 SIMPLE CHRONIC BRONCHITIS (HCC): Chronic | Status: ACTIVE | Noted: 2020-11-20

## 2020-11-20 PROBLEM — K21.9 GERD WITHOUT ESOPHAGITIS: Chronic | Status: ACTIVE | Noted: 2020-11-20

## 2020-11-20 LAB
ALBUMIN SERPL-MCNC: 4.8 G/DL (ref 3.5–5.2)
ALBUMIN/GLOB SERPL: 2 G/DL
ALP SERPL-CCNC: 79 U/L (ref 39–117)
ALT SERPL W P-5'-P-CCNC: 60 U/L (ref 1–41)
ANION GAP SERPL CALCULATED.3IONS-SCNC: 11 MMOL/L (ref 5–15)
ANION GAP SERPL CALCULATED.3IONS-SCNC: 12 MMOL/L (ref 5–15)
APTT PPP: 27.9 SECONDS (ref 24–31)
AST SERPL-CCNC: 34 U/L (ref 1–40)
BASOPHILS # BLD AUTO: 0 10*3/MM3 (ref 0–0.2)
BASOPHILS # BLD AUTO: 0.1 10*3/MM3 (ref 0–0.2)
BASOPHILS NFR BLD AUTO: 0.5 % (ref 0–1.5)
BASOPHILS NFR BLD AUTO: 0.8 % (ref 0–1.5)
BH CV NUCLEAR PRIOR STUDY: 3
BH CV STRESS BP STAGE 1: NORMAL
BH CV STRESS BP STAGE 2: NORMAL
BH CV STRESS BP STAGE 3: NORMAL
BH CV STRESS DURATION MIN STAGE 1: 3
BH CV STRESS DURATION MIN STAGE 2: 3
BH CV STRESS DURATION MIN STAGE 3: 3
BH CV STRESS DURATION SEC STAGE 1: 0
BH CV STRESS DURATION SEC STAGE 2: 0
BH CV STRESS DURATION SEC STAGE 3: 0
BH CV STRESS GRADE STAGE 1: 10
BH CV STRESS GRADE STAGE 2: 12
BH CV STRESS GRADE STAGE 3: 14
BH CV STRESS HR STAGE 1: 113
BH CV STRESS HR STAGE 2: 136
BH CV STRESS HR STAGE 3: 147
BH CV STRESS METS STAGE 1: 5
BH CV STRESS METS STAGE 2: 7.5
BH CV STRESS METS STAGE 3: 10
BH CV STRESS PROTOCOL 1: NORMAL
BH CV STRESS RECOVERY BP: NORMAL MMHG
BH CV STRESS RECOVERY HR: 88 BPM
BH CV STRESS SPEED STAGE 1: 1.7
BH CV STRESS SPEED STAGE 2: 2.5
BH CV STRESS SPEED STAGE 3: 3.4
BH CV STRESS STAGE 1: 1
BH CV STRESS STAGE 2: 2
BH CV STRESS STAGE 3: 3
BILIRUB SERPL-MCNC: 0.3 MG/DL (ref 0–1.2)
BUN SERPL-MCNC: 14 MG/DL (ref 6–20)
BUN SERPL-MCNC: 16 MG/DL (ref 6–20)
BUN/CREAT SERPL: 15.6 (ref 7–25)
BUN/CREAT SERPL: 18.4 (ref 7–25)
CALCIUM SPEC-SCNC: 9.2 MG/DL (ref 8.6–10.5)
CALCIUM SPEC-SCNC: 9.4 MG/DL (ref 8.6–10.5)
CHLORIDE SERPL-SCNC: 101 MMOL/L (ref 98–107)
CHLORIDE SERPL-SCNC: 101 MMOL/L (ref 98–107)
CO2 SERPL-SCNC: 25 MMOL/L (ref 22–29)
CO2 SERPL-SCNC: 26 MMOL/L (ref 22–29)
CREAT SERPL-MCNC: 0.87 MG/DL (ref 0.76–1.27)
CREAT SERPL-MCNC: 0.9 MG/DL (ref 0.76–1.27)
D DIMER PPP FEU-MCNC: 0.25 MG/L (FEU) (ref 0–0.59)
DEPRECATED RDW RBC AUTO: 41.6 FL (ref 37–54)
DEPRECATED RDW RBC AUTO: 42.4 FL (ref 37–54)
EOSINOPHIL # BLD AUTO: 0.2 10*3/MM3 (ref 0–0.4)
EOSINOPHIL # BLD AUTO: 0.2 10*3/MM3 (ref 0–0.4)
EOSINOPHIL NFR BLD AUTO: 2.1 % (ref 0.3–6.2)
EOSINOPHIL NFR BLD AUTO: 2.6 % (ref 0.3–6.2)
ERYTHROCYTE [DISTWIDTH] IN BLOOD BY AUTOMATED COUNT: 12.9 % (ref 12.3–15.4)
ERYTHROCYTE [DISTWIDTH] IN BLOOD BY AUTOMATED COUNT: 13.2 % (ref 12.3–15.4)
GFR SERPL CREATININE-BSD FRML MDRD: 95 ML/MIN/1.73
GFR SERPL CREATININE-BSD FRML MDRD: 99 ML/MIN/1.73
GLOBULIN UR ELPH-MCNC: 2.4 GM/DL
GLUCOSE SERPL-MCNC: 89 MG/DL (ref 65–99)
GLUCOSE SERPL-MCNC: 91 MG/DL (ref 65–99)
HCT VFR BLD AUTO: 45.3 % (ref 37.5–51)
HCT VFR BLD AUTO: 48.6 % (ref 37.5–51)
HGB BLD-MCNC: 15.4 G/DL (ref 13–17.7)
HGB BLD-MCNC: 16.4 G/DL (ref 13–17.7)
HOLD SPECIMEN: NORMAL
HOLD SPECIMEN: NORMAL
INR PPP: 0.99 (ref 0.93–1.1)
LYMPHOCYTES # BLD AUTO: 3.4 10*3/MM3 (ref 0.7–3.1)
LYMPHOCYTES # BLD AUTO: 3.8 10*3/MM3 (ref 0.7–3.1)
LYMPHOCYTES NFR BLD AUTO: 42.8 % (ref 19.6–45.3)
LYMPHOCYTES NFR BLD AUTO: 43.5 % (ref 19.6–45.3)
MAXIMAL PREDICTED HEART RATE: 183 BPM
MCH RBC QN AUTO: 31 PG (ref 26.6–33)
MCH RBC QN AUTO: 31.1 PG (ref 26.6–33)
MCHC RBC AUTO-ENTMCNC: 33.7 G/DL (ref 31.5–35.7)
MCHC RBC AUTO-ENTMCNC: 33.9 G/DL (ref 31.5–35.7)
MCV RBC AUTO: 91.4 FL (ref 79–97)
MCV RBC AUTO: 92.4 FL (ref 79–97)
MONOCYTES # BLD AUTO: 0.8 10*3/MM3 (ref 0.1–0.9)
MONOCYTES # BLD AUTO: 1.1 10*3/MM3 (ref 0.1–0.9)
MONOCYTES NFR BLD AUTO: 10.7 % (ref 5–12)
MONOCYTES NFR BLD AUTO: 12.3 % (ref 5–12)
NEUTROPHILS NFR BLD AUTO: 3.3 10*3/MM3 (ref 1.7–7)
NEUTROPHILS NFR BLD AUTO: 3.7 10*3/MM3 (ref 1.7–7)
NEUTROPHILS NFR BLD AUTO: 42 % (ref 42.7–76)
NEUTROPHILS NFR BLD AUTO: 42.7 % (ref 42.7–76)
NRBC BLD AUTO-RTO: 0.1 /100 WBC (ref 0–0.2)
NRBC BLD AUTO-RTO: 0.2 /100 WBC (ref 0–0.2)
PERCENT MAX PREDICTED HR: 80.33 %
PLATELET # BLD AUTO: 269 10*3/MM3 (ref 140–450)
PLATELET # BLD AUTO: 300 10*3/MM3 (ref 140–450)
PMV BLD AUTO: 7.5 FL (ref 6–12)
PMV BLD AUTO: 7.7 FL (ref 6–12)
POTASSIUM SERPL-SCNC: 4 MMOL/L (ref 3.5–5.2)
POTASSIUM SERPL-SCNC: 4 MMOL/L (ref 3.5–5.2)
PROT SERPL-MCNC: 7.2 G/DL (ref 6–8.5)
PROTHROMBIN TIME: 10.9 SECONDS (ref 9.6–11.7)
RBC # BLD AUTO: 4.96 10*6/MM3 (ref 4.14–5.8)
RBC # BLD AUTO: 5.26 10*6/MM3 (ref 4.14–5.8)
SODIUM SERPL-SCNC: 137 MMOL/L (ref 136–145)
SODIUM SERPL-SCNC: 139 MMOL/L (ref 136–145)
STRESS BASELINE BP: NORMAL MMHG
STRESS BASELINE HR: 94 BPM
STRESS PERCENT HR: 95 %
STRESS POST ESTIMATED WORKLOAD: 10.1 METS
STRESS POST EXERCISE DUR MIN: 9 MIN
STRESS POST EXERCISE DUR SEC: 0 SEC
STRESS POST PEAK BP: NORMAL MMHG
STRESS POST PEAK HR: 147 BPM
STRESS TARGET HR: 156 BPM
TROPONIN T SERPL-MCNC: <0.01 NG/ML (ref 0–0.03)
TROPONIN T SERPL-MCNC: <0.01 NG/ML (ref 0–0.03)
WBC # BLD AUTO: 7.8 10*3/MM3 (ref 3.4–10.8)
WBC # BLD AUTO: 8.9 10*3/MM3 (ref 3.4–10.8)
WHOLE BLOOD HOLD SPECIMEN: NORMAL
WHOLE BLOOD HOLD SPECIMEN: NORMAL

## 2020-11-20 PROCEDURE — 80048 BASIC METABOLIC PNL TOTAL CA: CPT | Performed by: PHYSICIAN ASSISTANT

## 2020-11-20 PROCEDURE — 93018 CV STRESS TEST I&R ONLY: CPT | Performed by: NURSE PRACTITIONER

## 2020-11-20 PROCEDURE — 85025 COMPLETE CBC W/AUTO DIFF WBC: CPT | Performed by: PHYSICIAN ASSISTANT

## 2020-11-20 PROCEDURE — A9500 TC99M SESTAMIBI: HCPCS | Performed by: INTERNAL MEDICINE

## 2020-11-20 PROCEDURE — G0378 HOSPITAL OBSERVATION PER HR: HCPCS

## 2020-11-20 PROCEDURE — 84484 ASSAY OF TROPONIN QUANT: CPT | Performed by: PHYSICIAN ASSISTANT

## 2020-11-20 PROCEDURE — 78452 HT MUSCLE IMAGE SPECT MULT: CPT | Performed by: INTERNAL MEDICINE

## 2020-11-20 PROCEDURE — 93017 CV STRESS TEST TRACING ONLY: CPT

## 2020-11-20 PROCEDURE — 71045 X-RAY EXAM CHEST 1 VIEW: CPT

## 2020-11-20 PROCEDURE — 78452 HT MUSCLE IMAGE SPECT MULT: CPT

## 2020-11-20 PROCEDURE — 0 TECHNETIUM SESTAMIBI: Performed by: INTERNAL MEDICINE

## 2020-11-20 PROCEDURE — 99235 HOSP IP/OBS SAME DATE MOD 70: CPT | Performed by: INTERNAL MEDICINE

## 2020-11-20 RX ORDER — ONDANSETRON 2 MG/ML
4 INJECTION INTRAMUSCULAR; INTRAVENOUS EVERY 6 HOURS PRN
Status: DISCONTINUED | OUTPATIENT
Start: 2020-11-20 | End: 2020-11-20 | Stop reason: HOSPADM

## 2020-11-20 RX ORDER — ONDANSETRON 4 MG/1
4 TABLET, FILM COATED ORAL EVERY 6 HOURS PRN
Status: DISCONTINUED | OUTPATIENT
Start: 2020-11-20 | End: 2020-11-20 | Stop reason: HOSPADM

## 2020-11-20 RX ORDER — SODIUM CHLORIDE 0.9 % (FLUSH) 0.9 %
10 SYRINGE (ML) INJECTION AS NEEDED
Status: DISCONTINUED | OUTPATIENT
Start: 2020-11-20 | End: 2020-11-20 | Stop reason: HOSPADM

## 2020-11-20 RX ORDER — ACETAMINOPHEN 325 MG/1
650 TABLET ORAL EVERY 4 HOURS PRN
Status: DISCONTINUED | OUTPATIENT
Start: 2020-11-20 | End: 2020-11-20 | Stop reason: HOSPADM

## 2020-11-20 RX ORDER — NITROGLYCERIN 0.4 MG/1
0.4 TABLET SUBLINGUAL
Status: DISCONTINUED | OUTPATIENT
Start: 2020-11-20 | End: 2020-11-20 | Stop reason: HOSPADM

## 2020-11-20 RX ORDER — BISACODYL 10 MG
10 SUPPOSITORY, RECTAL RECTAL DAILY PRN
Status: DISCONTINUED | OUTPATIENT
Start: 2020-11-20 | End: 2020-11-20 | Stop reason: HOSPADM

## 2020-11-20 RX ORDER — ACETAMINOPHEN 650 MG/1
650 SUPPOSITORY RECTAL EVERY 4 HOURS PRN
Status: DISCONTINUED | OUTPATIENT
Start: 2020-11-20 | End: 2020-11-20 | Stop reason: HOSPADM

## 2020-11-20 RX ORDER — SODIUM CHLORIDE 0.9 % (FLUSH) 0.9 %
10 SYRINGE (ML) INJECTION EVERY 12 HOURS SCHEDULED
Status: DISCONTINUED | OUTPATIENT
Start: 2020-11-20 | End: 2020-11-20 | Stop reason: HOSPADM

## 2020-11-20 RX ORDER — ALUMINA, MAGNESIA, AND SIMETHICONE 2400; 2400; 240 MG/30ML; MG/30ML; MG/30ML
15 SUSPENSION ORAL EVERY 6 HOURS PRN
Status: DISCONTINUED | OUTPATIENT
Start: 2020-11-20 | End: 2020-11-20 | Stop reason: HOSPADM

## 2020-11-20 RX ORDER — ASPIRIN 81 MG/1
324 TABLET, CHEWABLE ORAL ONCE
Status: COMPLETED | OUTPATIENT
Start: 2020-11-20 | End: 2020-11-20

## 2020-11-20 RX ORDER — ACETAMINOPHEN 160 MG/5ML
650 SOLUTION ORAL EVERY 4 HOURS PRN
Status: DISCONTINUED | OUTPATIENT
Start: 2020-11-20 | End: 2020-11-20 | Stop reason: HOSPADM

## 2020-11-20 RX ORDER — NITROGLYCERIN 0.4 MG/1
0.4 TABLET SUBLINGUAL
Status: COMPLETED | OUTPATIENT
Start: 2020-11-19 | End: 2020-11-20

## 2020-11-20 RX ORDER — ALBUTEROL SULFATE 2.5 MG/3ML
2.5 SOLUTION RESPIRATORY (INHALATION) EVERY 6 HOURS PRN
Status: DISCONTINUED | OUTPATIENT
Start: 2020-11-20 | End: 2020-11-20 | Stop reason: HOSPADM

## 2020-11-20 RX ORDER — CHOLECALCIFEROL (VITAMIN D3) 125 MCG
5 CAPSULE ORAL NIGHTLY PRN
Status: DISCONTINUED | OUTPATIENT
Start: 2020-11-20 | End: 2020-11-20 | Stop reason: HOSPADM

## 2020-11-20 RX ORDER — PANTOPRAZOLE SODIUM 40 MG/1
40 TABLET, DELAYED RELEASE ORAL
Status: DISCONTINUED | OUTPATIENT
Start: 2020-11-20 | End: 2020-11-20 | Stop reason: HOSPADM

## 2020-11-20 RX ADMIN — NITROGLYCERIN 0.4 MG: 0.4 TABLET SUBLINGUAL at 00:19

## 2020-11-20 RX ADMIN — NITROGLYCERIN 0.4 MG: 0.4 TABLET, ORALLY DISINTEGRATING SUBLINGUAL at 11:36

## 2020-11-20 RX ADMIN — PANTOPRAZOLE SODIUM 40 MG: 40 TABLET, DELAYED RELEASE ORAL at 07:58

## 2020-11-20 RX ADMIN — Medication 10 ML: at 07:58

## 2020-11-20 RX ADMIN — TECHNETIUM TC 99M SESTAMIBI 1 DOSE: 1 INJECTION INTRAVENOUS at 06:35

## 2020-11-20 RX ADMIN — ASPIRIN 81 MG CHEWABLE TABLET 324 MG: 81 TABLET CHEWABLE at 00:13

## 2020-11-20 RX ADMIN — NITROGLYCERIN 0.4 MG: 0.4 TABLET SUBLINGUAL at 00:13

## 2020-11-20 RX ADMIN — Medication 10 ML: at 10:06

## 2020-11-20 RX ADMIN — NITROGLYCERIN 0.4 MG: 0.4 TABLET SUBLINGUAL at 00:29

## 2020-11-20 RX ADMIN — TECHNETIUM TC 99M SESTAMIBI 1 DOSE: 1 INJECTION INTRAVENOUS at 09:40

## 2020-11-20 NOTE — DISCHARGE SUMMARY
"      [facility] Hospital Medicine Services  DISCHARGE SUMMARY        Prepared For PCP:  Bi Justice MD    Patient Name: Caleb Vargas  : 1985  MRN: [id number]      Date of Admission:   2020    Date of Discharge:  2020    Length of stay:  LOS: 0 days     Hospital Course     Presenting Problem:   Chest pain, unspecified type [R07.9]      Active Hospital Problems    Diagnosis  POA   • **Chest pain [R07.9]  Yes   • Other hyperlipidemia [E78.49]  Unknown   • Tobacco dependence due to cigarettes [F17.210]  Unknown   • Obesity (BMI 30-39.9) [E66.9]  Unknown   • GERD without esophagitis [K21.9]  Unknown   • Simple chronic bronchitis (CMS/HCC) [J41.0]  Unknown   • REGGIE treated with BiPAP [G47.33]  Yes      Resolved Hospital Problems   No resolved problems to display.       Chest pain:  - Serial troponin  - ON continuous cardiac monitor  - EKG personally reviewed without any acute changes appreciated  - Stress test negative  - HEART score of 4  - A1c on 2020 wnl 5.2; no need to repeat at this time  - Lipid panel on 2020 shows elevated , elevated  and low HDL 36 and elevated triglycerides 235; couldn't tolerate statins but is suppose to start zetia once myalgia symptoms improve.      HLD: patient reports that he had adverse reaction to statins and is being switched to a new medication     Tobacco use: smoking cessation discussed and encouraged; patient reports that he is attempting to quit     Obesity: BMI 33.14; lifestyle modifications discussed and encouraged     GERD: continue PPI     COPD: PRN inhaler available     REGGIE: continue bipap as indicated    Hospital Course:  Caleb Vargas is a 35 y.o. male  presented to [facility] complaining of sudden left sided \"sharp/stabbing\" chest pain with radiation straight through to back and associated with exertional dyspnea. Patient reports that the pain started while at rest around  on 2020 and has been " constant since that time with only mild improvement with the aspirin and nitroglycerin given in the ED. Patient denies any recent trauma or travel. He denies any previous chest pain like this before. He did eat prior to the onset but he states this is different from his normal GERD symptoms.     11/20/20 doing better today, will dc home.      Day of Discharge       Vital Signs:   Temp:  [97.8 °F (36.6 °C)-98.2 °F (36.8 °C)] 97.8 °F (36.6 °C)  Heart Rate:  [] 80  Resp:  [16-20] 20  BP: (132-179)/() 139/100       Physical Exam  Vitals signs and nursing note reviewed.   Constitutional:       General: He is not in acute distress.     Appearance: Normal appearance. He is well-developed. He is obese. He is not ill-appearing, toxic-appearing or diaphoretic.   HENT:      Head: Normocephalic and atraumatic.      Nose: Nose normal. No congestion or rhinorrhea.      Mouth/Throat:      Mouth: Mucous membranes are moist.      Pharynx: No oropharyngeal exudate.   Eyes:      General: No scleral icterus.        Right eye: No discharge.         Left eye: No discharge.      Extraocular Movements: Extraocular movements intact.      Conjunctiva/sclera: Conjunctivae normal.      Pupils: Pupils are equal, round, and reactive to light.   Neck:      Musculoskeletal: Normal range of motion and neck supple. No neck rigidity.      Thyroid: No thyromegaly.      Vascular: No JVD.      Trachea: No tracheal deviation.   Cardiovascular:      Rate and Rhythm: Normal rate and regular rhythm.      Pulses: Normal pulses.      Heart sounds: Normal heart sounds. No murmur. No friction rub. No gallop.    Pulmonary:      Effort: Pulmonary effort is normal. No respiratory distress.      Breath sounds: Normal breath sounds. No stridor. No wheezing, rhonchi or rales.   Chest:      Chest wall: No tenderness.   Abdominal:      General: Bowel sounds are normal. There is no distension.      Palpations: Abdomen is soft. There is no mass.       Tenderness: There is no abdominal tenderness. There is no guarding or rebound.      Hernia: No hernia is present.   Musculoskeletal: Normal range of motion.         General: No swelling, tenderness, deformity or signs of injury.      Right lower leg: No edema.      Left lower leg: No edema.      Comments: Chest wall tenderness    Lymphadenopathy:      Cervical: No cervical adenopathy.   Skin:     General: Skin is warm and dry.      Coloration: Skin is not jaundiced or pale.      Findings: No bruising, erythema or rash.   Neurological:      General: No focal deficit present.      Mental Status: He is alert and oriented to person, place, and time. Mental status is at baseline.      Cranial Nerves: No cranial nerve deficit.      Sensory: No sensory deficit.      Motor: No weakness or abnormal muscle tone.      Coordination: Coordination normal.   Psychiatric:         Mood and Affect: Mood normal.         Behavior: Behavior normal.         Thought Content: Thought content normal.         Judgment: Judgment normal.         Pertinent  and/or Most Recent Results     Results from last 7 days   Lab Units 11/20/20  0326 11/19/20  2343   WBC 10*3/mm3 7.80 8.90   HEMOGLOBIN g/dL 15.4 16.4   HEMATOCRIT % 45.3 48.6   PLATELETS 10*3/mm3 269 300   SODIUM mmol/L 137 139   POTASSIUM mmol/L 4.0 4.0   CHLORIDE mmol/L 101 101   CO2 mmol/L 25.0 26.0   BUN mg/dL 16 14   CREATININE mg/dL 0.87 0.90   GLUCOSE mg/dL 91 89   CALCIUM mg/dL 9.2 9.4     Results from last 7 days   Lab Units 11/19/20  2343   BILIRUBIN mg/dL 0.3   ALK PHOS U/L 79   ALT (SGPT) U/L 60*   AST (SGOT) U/L 34   PROTIME Seconds 10.9   INR  0.99   APTT seconds 27.9           Invalid input(s): TG, LDLCALC, LDLREALC  Results from last 7 days   Lab Units 11/20/20  0326 11/19/20  2343   TROPONIN T ng/mL <0.010 <0.010       Brief Urine Lab Results     None          Microbiology Results Abnormal     None          Xr Chest 1 View    Result Date: 11/20/2020  Impression: No acute  process.  Electronically Signed By-Nehemiah Read MD On:11/20/2020 7:17 AM This report was finalized on [id number] by  Nehemiah Read MD.                             Test Results Pending at Discharge        Procedures Performed           Consults:   Consults     Date and Time Order Name Status Description    11/20/2020 0117 Inpatient Hospitalist Consult Completed             Discharge Details        Discharge Medications      Continue These Medications      Instructions Start Date   albuterol sulfate  (90 Base) MCG/ACT inhaler  Commonly known as: PROVENTIL HFA;VENTOLIN HFA;PROAIR HFA   2 puffs, Inhalation, Every 4 Hours PRN      omeprazole 20 MG capsule  Commonly known as: priLOSEC   20 mg, Oral, Daily             Allergies   Allergen Reactions   • Atorvastatin Unknown - Low Severity   • Latex Hives   • Latex Rash   • Tamiflu [Oseltamivir Phosphate] Nausea And Vomiting         Discharge Disposition:  Home or Self Care    Diet:  Hospital:  Diet Order   Procedures   • Diet Cardiac; Healthy Heart         Discharge Activity:         CODE STATUS:    Code Status and Medical Interventions:   Ordered at: 11/20/20 0230     Code Status:    CPR     Medical Interventions (Level of Support Prior to Arrest):    Full         Follow-up Appointments  No future appointments.          Condition on Discharge:      Stable      This patient has been examined wearing appropriate Personal Protective Equipment and discussed with rn. 11/20/20      Electronically signed by Catrachito Rodriguez MD, 11/20/20, 3:47 PM EST.      Time: I spent  34  minutes on this discharge activity which included face-to-face encounter with the patient/reviewing the data in the system/coordination of the care with the nursing staff as well as consultants/documentation/entering orders.

## 2020-11-20 NOTE — PLAN OF CARE
Goal Outcome Evaluation:  Plan of Care Reviewed With: patient     Outcome Summary: Pt admitted for chest pain, currently A/O and on RA. Pt has been resting with no c/o since arriving to the floor. Will continue to monitor.

## 2020-11-20 NOTE — H&P
"      [facility] Medicine Services      Patient Name: Caleb Vargas  : 1985  MRN: [id number]  Primary Care Physician: Faraz Lemus NP-C  Date of admission: 2020    Patient Care Team:  Faraz Lemus NP-C as PCP - General (Nurse Practitioner)  Eduar Rojas          Subjective   History Present Illness     Chief Complaint:   Chief Complaint   Patient presents with   • Chest Pain     Chest pain    Mr. Vargas is a 35 y.o.  presents to [facility] complaining of sudden left sided \"sharp/stabbing\" chest pain with radiation straight through to back and associated with exertional dyspnea. Patient reports that the pain started while at rest around  on 2020 and has been constant since that time with only mild improvement with the aspirin and nitroglycerin given in the ED. Patient denies any recent trauma or travel. He denies any previous chest pain like this before. He did eat prior to the onset but he states this is different from his normal GERD symptoms.          History of Present Illness    Review of Systems   All other systems reviewed and are negative.          Personal History     Past Medical History:   Past Medical History:   Diagnosis Date   • Abdominal pain    • Agitation    • Allergies    • Apnea    • Change in weight    • Chest congestion    • Choking    • Cough    • GERD (gastroesophageal reflux disease)    • High blood pressure    • Obesity    • Sleep disturbance    • Snoring    • Wheezing        Surgical History:      Past Surgical History:   Procedure Laterality Date   • CLEFT PALATE REPAIR     • SINUS SURGERY     • TONSILLECTOMY             Family History: family history includes No Known Problems in his brother, father, and mother. Otherwise pertinent FHx was reviewed and unremarkable.     Social History:  reports that he has been smoking. He has never used smokeless tobacco. He reports current alcohol use. He reports that he does not use " drugs.      Medications:  Prior to Admission medications    Medication Sig Start Date End Date Taking? Authorizing Provider   albuterol sulfate  (90 Base) MCG/ACT inhaler Inhale 2 puffs Every 4 (Four) Hours As Needed for Wheezing. 12/28/19   Mariah Guaman PA   benzonatate (TESSALON) 200 MG capsule Take 1 capsule by mouth 3 (Three) Times a Day As Needed for Cough. 12/28/19   Mariah Guaman PA   brompheniramine-pseudoephedrine-DM 30-2-10 MG/5ML syrup Take 5 mL by mouth 4 (Four) Times a Day As Needed for Allergies. 12/28/19   Mariah Guaman PA   omeprazole (priLOSEC) 20 MG capsule Take 20 mg by mouth Daily.    Provider, Cedric, MD       Allergies:    Allergies   Allergen Reactions   • Atorvastatin Unknown - Low Severity   • Latex Hives   • Latex Rash   • Tamiflu [Oseltamivir Phosphate] Nausea And Vomiting       Objective   Objective     Vital Signs  Temp:  [98.2 °F (36.8 °C)] 98.2 °F (36.8 °C)  Heart Rate:  [] 79  Resp:  [16-18] 17  BP: (132-179)/() 164/97  SpO2:  [93 %-98 %] 98 %  on   ;   Device (Oxygen Therapy): room air  Body mass index is 33.14 kg/m².    Physical Exam  Vitals signs reviewed.   Constitutional:       General: He is not in acute distress.     Appearance: Normal appearance. He is obese. He is not ill-appearing, toxic-appearing or diaphoretic.   HENT:      Head: Normocephalic and atraumatic.      Right Ear: External ear normal.      Left Ear: External ear normal.      Nose:      Comments: Wearing mask      Mouth/Throat:      Comments: Wearing mask   Eyes:      General: No scleral icterus.        Right eye: No discharge.         Left eye: No discharge.      Extraocular Movements: Extraocular movements intact.      Conjunctiva/sclera: Conjunctivae normal.      Comments: Wearing glasses   Neck:      Musculoskeletal: Neck supple.   Cardiovascular:      Rate and Rhythm: Normal rate and regular rhythm.      Pulses: Normal pulses.      Heart sounds: Normal heart  sounds. No murmur.   Pulmonary:      Effort: Pulmonary effort is normal. No respiratory distress.      Breath sounds: Normal breath sounds. No wheezing or rales.   Chest:      Chest wall: No tenderness.   Abdominal:      General: Bowel sounds are normal. There is no distension.      Palpations: Abdomen is soft.      Tenderness: There is no abdominal tenderness. There is no guarding.   Musculoskeletal: Normal range of motion.      Right lower leg: No edema.      Left lower leg: No edema.   Skin:     General: Skin is warm and dry.   Neurological:      General: No focal deficit present.      Mental Status: He is alert and oriented to person, place, and time.   Psychiatric:         Mood and Affect: Mood normal.         Behavior: Behavior normal.         Thought Content: Thought content normal.         Judgment: Judgment normal.         Results Review:  I have personally reviewed most recent cardiac tracings, lab results, microbiology results, pathology results and radiology images and interpretations and agree with findings, most notably: .    Results from last 7 days   Lab Units 11/19/20  2343   WBC 10*3/mm3 8.90   HEMOGLOBIN g/dL 16.4   HEMATOCRIT % 48.6   PLATELETS 10*3/mm3 300   INR  0.99     Results from last 7 days   Lab Units 11/19/20  2343   SODIUM mmol/L 139   POTASSIUM mmol/L 4.0   CHLORIDE mmol/L 101   CO2 mmol/L 26.0   BUN mg/dL 14   CREATININE mg/dL 0.90   GLUCOSE mg/dL 89   CALCIUM mg/dL 9.4   ALT (SGPT) U/L 60*   AST (SGOT) U/L 34   TROPONIN T ng/mL <0.010     Estimated Creatinine Clearance: 140.5 mL/min (by C-G formula based on SCr of 0.9 mg/dL).  Brief Urine Lab Results     None          Microbiology Results (last 10 days)     ** No results found for the last 240 hours. **          ECG/EMG Results (most recent)     Procedure Component Value Units Date/Time    ECG 12 Lead [[id number]] Collected: 11/19/20 2340     Updated: 11/19/20 2342     QT Interval 338 ms     Narrative:      HEART RATE= 90  bpm  RR  Interval= 668  ms  MD Interval= 155  ms  P Horizontal Axis= 11  deg  P Front Axis= 38  deg  QRSD Interval= 84  ms  QT Interval= 338  ms  QRS Axis= 0  deg  T Wave Axis= 36  deg  - NORMAL ECG -  Sinus rhythm  When compared with ECG of 28-Dec-2019 12:29:16,  Significant rate decrease  Electronically Signed By:   Date and Time of Study: 2020-11-19 23:40:25                    No radiology results for the last 7 days      Estimated Creatinine Clearance: 140.5 mL/min (by C-G formula based on SCr of 0.9 mg/dL).    Assessment/Plan   Assessment/Plan       Active Hospital Problems    Diagnosis  POA   • **Chest pain [R07.9]  Yes   • Other hyperlipidemia [E78.49]  Unknown   • Tobacco dependence due to cigarettes [F17.210]  Unknown   • Obesity (BMI 30-39.9) [E66.9]  Unknown   • GERD without esophagitis [K21.9]  Unknown   • Simple chronic bronchitis (CMS/HCC) [J41.0]  Unknown   • REGGIE treated with BiPAP [G47.33]  Yes      Resolved Hospital Problems   No resolved problems to display.       Chest pain:  - Serial troponin  - ON continuous cardiac monitor  - EKG personally reviewed without any acute changes appreciated  - Stress test in the AM  - HEART score of 4  - A1c on 7/2020 wnl 5.2; no need to repeat at this time  - Lipid panel on 7/2020 shows elevated , elevated  and low HDL 36 and elevated triglycerides 235; couldn't tolerate statins but is suppose to start zetia once myalgia symptoms improve.     HLD: patient reports that he had adverse reaction to statins and is being switched to a new medication    Tobacco use: smoking cessation discussed and encouraged; patient reports that he is attempting to quit    Obesity: BMI 33.14; lifestyle modifications discussed and encouraged    GERD: continue PPI    COPD: PRN inhaler available    REGGIE: continue bipap as indicated            VTE Prophylaxis -   Mechanical Order History:     None      Pharmalogical Order History:     None          CODE STATUS:    There are no questions  and answers to display.       This patient has been examined wearing appropriate Personal Protective Equipment and discussed with ED provider. 11/20/20      I discussed the patient's findings and my recommendations with patient.      Signature:Electronically signed by Carolyn Gibbs PA-C, 11/20/20, 2:17 AM EST.      [facility] Hospitalist Team

## 2020-11-20 NOTE — ED PROVIDER NOTES
Subjective   35-year-old male complains of moderate substernal chest soreness onset at 8 PM at rest.  Pain radiates to back, no clear aggravating alleviating factors, no associated cough or shortness of air or other symptoms.          Review of Systems   Cardiovascular: Positive for chest pain.   All other systems reviewed and are negative.      Past Medical History:   Diagnosis Date   • Abdominal pain    • Agitation    • Allergies    • Apnea    • Change in weight    • Chest congestion    • Choking    • Cough    • GERD (gastroesophageal reflux disease)    • High blood pressure    • Obesity    • Sleep disturbance    • Snoring    • Wheezing        Allergies   Allergen Reactions   • Atorvastatin Unknown - Low Severity   • Latex Hives   • Latex Rash       Past Surgical History:   Procedure Laterality Date   • CLEFT PALATE REPAIR     • SINUS SURGERY     • TONSILLECTOMY         No family history on file.    Social History     Socioeconomic History   • Marital status: Single     Spouse name: Not on file   • Number of children: Not on file   • Years of education: Not on file   • Highest education level: Not on file   Tobacco Use   • Smoking status: Current Every Day Smoker   • Smokeless tobacco: Never Used           Objective   Physical Exam  Constitutional:       Appearance: He is well-developed.   HENT:      Head: Normocephalic and atraumatic.      Mouth/Throat:      Mouth: Mucous membranes are moist.      Pharynx: Oropharynx is clear.   Eyes:      Conjunctiva/sclera: Conjunctivae normal.      Pupils: Pupils are equal, round, and reactive to light.   Neck:      Musculoskeletal: Normal range of motion and neck supple.   Cardiovascular:      Rate and Rhythm: Normal rate and regular rhythm.      Heart sounds: Normal heart sounds.   Pulmonary:      Effort: Pulmonary effort is normal.      Breath sounds: Normal breath sounds.   Abdominal:      General: Bowel sounds are normal. There is no distension.      Palpations: Abdomen  is soft.      Tenderness: There is no abdominal tenderness.   Musculoskeletal: Normal range of motion.         General: No swelling or tenderness.   Skin:     General: Skin is warm and dry.      Capillary Refill: Capillary refill takes less than 2 seconds.   Neurological:      General: No focal deficit present.      Mental Status: He is alert and oriented to person, place, and time.   Psychiatric:         Mood and Affect: Mood normal.         Behavior: Behavior normal.         Procedures           ED Course  ED Course as of Nov 20 0119   Thu Nov 19, 2020   2348 EKG interpretation: Normal sinus rhythm, rate 90, no acute ST change    [JR]      ED Course User Index  [JR] Jm Parry MD                                           MDM  Number of Diagnoses or Management Options  Chest pain, unspecified type:   Diagnosis management comments: Results for orders placed or performed during the hospital encounter of 11/19/20  -Comprehensive Metabolic Panel  Specimen: Blood       Result                      Value             Ref Range           Glucose                     89                65 - 99 mg/dL       BUN                         14                6 - 20 mg/dL        Creatinine                  0.90              0.76 - 1.27 *       Sodium                      139               136 - 145 mm*       Potassium                   4.0               3.5 - 5.2 mm*       Chloride                    101               98 - 107 mmo*       CO2                         26.0              22.0 - 29.0 *       Calcium                     9.4               8.6 - 10.5 m*       Total Protein               7.2               6.0 - 8.5 g/*       Albumin                     4.80              3.50 - 5.20 *       ALT (SGPT)                  60 (H)            1 - 41 U/L          AST (SGOT)                  34                1 - 40 U/L          Alkaline Phosphatase        79                39 - 117 U/L        Total Bilirubin             0.3                0.0 - 1.2 mg*       eGFR Non African Amer       95                >60 mL/min/1*       Globulin                    2.4               gm/dL               A/G Ratio                   2.0               g/dL                BUN/Creatinine Ratio        15.6              7.0 - 25.0          Anion Gap                   12.0              5.0 - 15.0 m*  -Protime-INR  Specimen: Blood       Result                      Value             Ref Range           Protime                     10.9              9.6 - 11.7 S*       INR                         0.99              0.93 - 1.10    -aPTT  Specimen: Blood       Result                      Value             Ref Range           PTT                         27.9              24.0 - 31.0 *  -Troponin  Specimen: Blood       Result                      Value             Ref Range           Troponin T                  <0.010            0.000 - 0.03*  -D-dimer, Quantitative  Specimen: Blood       Result                      Value             Ref Range           D-Dimer, Quantitative       0.25              0.00 - 0.59 *  -CBC Auto Differential  Specimen: Blood       Result                      Value             Ref Range           WBC                         8.90              3.40 - 10.80*       RBC                         5.26              4.14 - 5.80 *       Hemoglobin                  16.4              13.0 - 17.7 *       Hematocrit                  48.6              37.5 - 51.0 %       MCV                         92.4              79.0 - 97.0 *       MCH                         31.1              26.6 - 33.0 *       MCHC                        33.7              31.5 - 35.7 *       RDW                         12.9              12.3 - 15.4 %       RDW-SD                      41.6              37.0 - 54.0 *       MPV                         7.7               6.0 - 12.0 fL       Platelets                   300               140 - 450 10*       Neutrophil %                42.0 (L)           42.7 - 76.0 %       Lymphocyte %                42.8              19.6 - 45.3 %       Monocyte %                  12.3 (H)          5.0 - 12.0 %        Eosinophil %                2.1               0.3 - 6.2 %         Basophil %                  0.8               0.0 - 1.5 %         Neutrophils, Absolute       3.70              1.70 - 7.00 *       Lymphocytes, Absolute       3.80 (H)          0.70 - 3.10 *       Monocytes, Absolute         1.10 (H)          0.10 - 0.90 *       Eosinophils, Absolute       0.20              0.00 - 0.40 *       Basophils, Absolute         0.10              0.00 - 0.20 *       nRBC                        0.2               0.0 - 0.2 /1*  -ECG 12 Lead       Result                      Value             Ref Range           QT Interval                 338               ms             -Light Blue Top       Result                      Value             Ref Range           Extra Tube                                                    hold for add-on  -Green Top (Gel)       Result                      Value             Ref Range           Extra Tube                                                   -Lavender Top       Result                      Value             Ref Range           Extra Tube                                                    hold for add-on  -Gold Top - SST       Result                      Value             Ref Range           Extra Tube                                                    Hold for add-ons.  No radiology results for the last day    Mild decreased pain with ntg, there is some pain with palpation but no injury.  Pain radiates to back with left hand tingling, given risk factors and persistence of pain, will observe.       Amount and/or Complexity of Data Reviewed  Clinical lab tests: reviewed  Tests in the radiology section of CPT®: reviewed        Final diagnoses:   Chest pain, unspecified type            Jm Parry MD  11/20/20 0119

## 2020-11-20 NOTE — ED NOTES
Patient reports midsternal chest pain that started approx 2000 tonight. Describes the pain as stabbing pain that radiates into his left back. Reports some dizziness and left arm tingling that has since resolved. Denies cardiac history himself, family history of heart disease.      Yarelis Brink, LPN  11/19/20 9040

## 2020-11-20 NOTE — PROGRESS NOTES
Discharge Planning Assessment  [facility]     Patient Name: Caleb Vargas  MRN: [id number]  Today's Date: 11/20/2020    Admit Date: 11/19/2020    Discharge Needs Assessment     Row Name 11/20/20 1347       Living Environment    Lives With  spouse    Current Living Arrangements  home/apartment/condo    Primary Care Provided by  self    Provides Primary Care For  no one    Family Caregiver if Needed  spouse    Quality of Family Relationships  helpful    Able to Return to Prior Arrangements  yes       Resource/Environmental Concerns    Resource/Environmental Concerns  none    Transportation Concerns  car, none       Transition Planning    Patient/Family Anticipates Transition to  home with family    Patient/Family Anticipated Services at Transition  none    Transportation Anticipated  family or friend will provide       Discharge Needs Assessment    Readmission Within the Last 30 Days  no previous admission in last 30 days    Equipment Currently Used at Home  cpap CPAP through [employer]    Concerns to be Addressed  denies needs/concerns at this time;no discharge needs identified    Anticipated Changes Related to Illness  none    Equipment Needed After Discharge  none        Discharge Plan     Row Name 11/20/20 3611       Plan    Plan  Anticipate routine home    Patient/Family in Agreement with Plan  yes    Plan Comments  Met with patient at bedside, reports he lives at home with spouse. Spouse is currently in [place] caring for her mother. I with ADLs, still drives. PCP [facility], and updated care teams. No issues affording food or medications. Currently denies any d/c needs or concerns. DC barriers: myoview        Demographic Summary     Row Name 11/20/20 4657       General Information    Admission Type  observation    Arrived From  emergency department    Referral Source  admission list    Reason for Consult  discharge planning    Preferred Language  English     Used During This Interaction   no        Functional Status     Row Name 11/20/20 1340       Functional Status    Usual Activity Tolerance  good    Current Activity Tolerance  good       Functional Status, IADL    Medications  independent    Meal Preparation  independent    Housekeeping  independent    Laundry  independent    Shopping  independent        Met with patient in room wearing PPE: mask, goggles.     Maintained distance greater than six feet and spent less than 15 minutes in the room.          Lisa Garcia RN

## 2020-11-22 LAB — QT INTERVAL: 338 MS

## 2020-11-23 NOTE — PROGRESS NOTES
Case Management Discharge Note      Final Note: Home         Selected Continued Care - Discharged on 11/20/2020 Admission date: 11/19/2020 - Discharge disposition: Home or Self Care                 Final Discharge Disposition Code: 01 - home or self-care

## 2021-03-15 ENCOUNTER — TRANSCRIBE ORDERS (OUTPATIENT)
Dept: ADMINISTRATIVE | Facility: HOSPITAL | Age: 36
End: 2021-03-15

## 2021-03-15 DIAGNOSIS — M54.12 CERVICAL RADICULOPATHY: Primary | ICD-10-CM

## 2021-03-22 ENCOUNTER — HOSPITAL ENCOUNTER (OUTPATIENT)
Dept: MRI IMAGING | Facility: HOSPITAL | Age: 36
Discharge: HOME | End: 2021-03-22
Admitting: INTERNAL MEDICINE

## 2021-03-22 DIAGNOSIS — M54.12 CERVICAL RADICULOPATHY: ICD-10-CM

## 2021-03-22 PROCEDURE — 72141 MRI NECK SPINE W/O DYE: CPT

## 2021-11-30 ENCOUNTER — HOSPITAL ENCOUNTER (EMERGENCY)
Facility: HOSPITAL | Age: 36
Discharge: HOME | End: 2021-11-30
Attending: EMERGENCY MEDICINE | Admitting: EMERGENCY MEDICINE

## 2021-11-30 ENCOUNTER — APPOINTMENT (OUTPATIENT)
Dept: GENERAL RADIOLOGY | Facility: HOSPITAL | Age: 36
End: 2021-11-30

## 2021-11-30 VITALS
DIASTOLIC BLOOD PRESSURE: 82 MMHG | OXYGEN SATURATION: 98 % | RESPIRATION RATE: 18 BRPM | TEMPERATURE: 98.6 F | WEIGHT: 258.38 LBS | SYSTOLIC BLOOD PRESSURE: 158 MMHG | HEIGHT: 71 IN | HEART RATE: 99 BPM | BODY MASS INDEX: 36.17 KG/M2

## 2021-11-30 DIAGNOSIS — R07.9 CHEST PAIN, UNSPECIFIED TYPE: Primary | ICD-10-CM

## 2021-11-30 LAB
ANION GAP SERPL CALCULATED.3IONS-SCNC: 14 MMOL/L (ref 5–15)
APTT PPP: 29.1 SECONDS (ref 24–31)
BASOPHILS # BLD AUTO: 0.1 10*3/MM3 (ref 0–0.2)
BASOPHILS NFR BLD AUTO: 0.7 % (ref 0–1.5)
BUN SERPL-MCNC: 17 MG/DL (ref 6–20)
BUN/CREAT SERPL: 20.2 (ref 7–25)
CALCIUM SPEC-SCNC: 9.2 MG/DL (ref 8.6–10.5)
CHLORIDE SERPL-SCNC: 104 MMOL/L (ref 98–107)
CO2 SERPL-SCNC: 22 MMOL/L (ref 22–29)
CREAT SERPL-MCNC: 0.84 MG/DL (ref 0.76–1.27)
D DIMER PPP FEU-MCNC: <0.19 MG/L (FEU) (ref 0–0.59)
DEPRECATED RDW RBC AUTO: 42 FL (ref 37–54)
EOSINOPHIL # BLD AUTO: 0.3 10*3/MM3 (ref 0–0.4)
EOSINOPHIL NFR BLD AUTO: 3.3 % (ref 0.3–6.2)
ERYTHROCYTE [DISTWIDTH] IN BLOOD BY AUTOMATED COUNT: 13.1 % (ref 12.3–15.4)
GFR SERPL CREATININE-BSD FRML MDRD: 102 ML/MIN/1.73
GLUCOSE SERPL-MCNC: 104 MG/DL (ref 65–99)
HCT VFR BLD AUTO: 44.9 % (ref 37.5–51)
HGB BLD-MCNC: 15.7 G/DL (ref 13–17.7)
HOLD SPECIMEN: NORMAL
INR PPP: 0.99 (ref 0.93–1.1)
LYMPHOCYTES # BLD AUTO: 3.4 10*3/MM3 (ref 0.7–3.1)
LYMPHOCYTES NFR BLD AUTO: 43.4 % (ref 19.6–45.3)
MCH RBC QN AUTO: 32.2 PG (ref 26.6–33)
MCHC RBC AUTO-ENTMCNC: 35.1 G/DL (ref 31.5–35.7)
MCV RBC AUTO: 91.7 FL (ref 79–97)
MONOCYTES # BLD AUTO: 0.8 10*3/MM3 (ref 0.1–0.9)
MONOCYTES NFR BLD AUTO: 10.3 % (ref 5–12)
NEUTROPHILS NFR BLD AUTO: 3.3 10*3/MM3 (ref 1.7–7)
NEUTROPHILS NFR BLD AUTO: 42.3 % (ref 42.7–76)
NRBC BLD AUTO-RTO: 0.1 /100 WBC (ref 0–0.2)
PLATELET # BLD AUTO: 246 10*3/MM3 (ref 140–450)
PMV BLD AUTO: 7.2 FL (ref 6–12)
POTASSIUM SERPL-SCNC: 4.4 MMOL/L (ref 3.5–5.2)
PROTHROMBIN TIME: 11 SECONDS (ref 9.6–11.7)
RBC # BLD AUTO: 4.89 10*6/MM3 (ref 4.14–5.8)
SODIUM SERPL-SCNC: 140 MMOL/L (ref 136–145)
TROPONIN T SERPL-MCNC: <0.01 NG/ML (ref 0–0.03)
WBC NRBC COR # BLD: 7.8 10*3/MM3 (ref 3.4–10.8)

## 2021-11-30 PROCEDURE — 36415 COLL VENOUS BLD VENIPUNCTURE: CPT

## 2021-11-30 PROCEDURE — 85730 THROMBOPLASTIN TIME PARTIAL: CPT | Performed by: EMERGENCY MEDICINE

## 2021-11-30 PROCEDURE — 99283 EMERGENCY DEPT VISIT LOW MDM: CPT

## 2021-11-30 PROCEDURE — 80048 BASIC METABOLIC PNL TOTAL CA: CPT | Performed by: EMERGENCY MEDICINE

## 2021-11-30 PROCEDURE — 71045 X-RAY EXAM CHEST 1 VIEW: CPT

## 2021-11-30 PROCEDURE — 84484 ASSAY OF TROPONIN QUANT: CPT | Performed by: EMERGENCY MEDICINE

## 2021-11-30 PROCEDURE — 85379 FIBRIN DEGRADATION QUANT: CPT | Performed by: EMERGENCY MEDICINE

## 2021-11-30 PROCEDURE — 85610 PROTHROMBIN TIME: CPT | Performed by: EMERGENCY MEDICINE

## 2021-11-30 PROCEDURE — 85025 COMPLETE CBC W/AUTO DIFF WBC: CPT | Performed by: EMERGENCY MEDICINE

## 2021-11-30 PROCEDURE — 93005 ELECTROCARDIOGRAM TRACING: CPT

## 2021-11-30 RX ORDER — SODIUM CHLORIDE 0.9 % (FLUSH) 0.9 %
10 SYRINGE (ML) INJECTION AS NEEDED
Status: DISCONTINUED | OUTPATIENT
Start: 2021-11-30 | End: 2021-11-30 | Stop reason: HOSPADM

## 2021-12-01 LAB — QT INTERVAL: 325 MS

## 2022-05-16 DIAGNOSIS — G47.10 HYPERSOMNIA: ICD-10-CM

## 2022-05-16 DIAGNOSIS — G25.81 RLS (RESTLESS LEGS SYNDROME): ICD-10-CM

## 2022-05-16 DIAGNOSIS — G47.8 NON-RESTORATIVE SLEEP: ICD-10-CM

## 2022-05-16 DIAGNOSIS — G47.30 SLEEP APNEA, UNSPECIFIED TYPE: Primary | ICD-10-CM

## 2022-05-23 DIAGNOSIS — G25.81 RLS (RESTLESS LEGS SYNDROME): ICD-10-CM

## 2022-05-23 DIAGNOSIS — G47.30 SLEEP APNEA, UNSPECIFIED TYPE: Primary | ICD-10-CM

## 2022-05-23 DIAGNOSIS — G47.8 NON-RESTORATIVE SLEEP: ICD-10-CM

## 2022-05-23 DIAGNOSIS — G47.10 HYPERSOMNIA: ICD-10-CM

## 2022-05-23 RX ORDER — ZOLPIDEM TARTRATE 5 MG/1
TABLET ORAL
Qty: 2 TABLET | Refills: 0 | Status: ON HOLD | OUTPATIENT
Start: 2022-05-23 | End: 2022-05-28

## 2022-05-25 ENCOUNTER — APPOINTMENT (OUTPATIENT)
Dept: CT IMAGING | Facility: HOSPITAL | Age: 37
End: 2022-05-25

## 2022-05-25 ENCOUNTER — HOSPITAL ENCOUNTER (EMERGENCY)
Facility: HOSPITAL | Age: 37
Discharge: HOME | End: 2022-05-25
Attending: EMERGENCY MEDICINE | Admitting: EMERGENCY MEDICINE

## 2022-05-25 VITALS
RESPIRATION RATE: 20 BRPM | BODY MASS INDEX: 37.47 KG/M2 | HEART RATE: 112 BPM | WEIGHT: 267.64 LBS | TEMPERATURE: 98 F | SYSTOLIC BLOOD PRESSURE: 146 MMHG | HEIGHT: 71 IN | OXYGEN SATURATION: 95 % | DIASTOLIC BLOOD PRESSURE: 96 MMHG

## 2022-05-25 DIAGNOSIS — R10.30 LOWER ABDOMINAL PAIN: Primary | ICD-10-CM

## 2022-05-25 DIAGNOSIS — K57.92 ACUTE DIVERTICULITIS: ICD-10-CM

## 2022-05-25 LAB
ALBUMIN SERPL-MCNC: 4.3 G/DL (ref 3.5–5.2)
ALBUMIN/GLOB SERPL: 1.5 G/DL
ALP SERPL-CCNC: 81 U/L (ref 39–117)
ALT SERPL W P-5'-P-CCNC: 39 U/L (ref 1–41)
ANION GAP SERPL CALCULATED.3IONS-SCNC: 12 MMOL/L (ref 5–15)
AST SERPL-CCNC: 26 U/L (ref 1–40)
BASOPHILS # BLD AUTO: 0.1 10*3/MM3 (ref 0–0.2)
BASOPHILS NFR BLD AUTO: 0.5 % (ref 0–1.5)
BILIRUB SERPL-MCNC: 0.6 MG/DL (ref 0–1.2)
BILIRUB UR QL STRIP: NEGATIVE
BUN SERPL-MCNC: 16 MG/DL (ref 6–20)
BUN/CREAT SERPL: 20.3 (ref 7–25)
CALCIUM SPEC-SCNC: 8.9 MG/DL (ref 8.6–10.5)
CHLORIDE SERPL-SCNC: 100 MMOL/L (ref 98–107)
CLARITY UR: CLEAR
CO2 SERPL-SCNC: 23 MMOL/L (ref 22–29)
COLOR UR: ABNORMAL
CREAT SERPL-MCNC: 0.79 MG/DL (ref 0.76–1.27)
DEPRECATED RDW RBC AUTO: 42 FL (ref 37–54)
EGFRCR SERPLBLD CKD-EPI 2021: 116.6 ML/MIN/1.73
EOSINOPHIL # BLD AUTO: 0.2 10*3/MM3 (ref 0–0.4)
EOSINOPHIL NFR BLD AUTO: 1.2 % (ref 0.3–6.2)
ERYTHROCYTE [DISTWIDTH] IN BLOOD BY AUTOMATED COUNT: 13.4 % (ref 12.3–15.4)
GLOBULIN UR ELPH-MCNC: 2.9 GM/DL
GLUCOSE SERPL-MCNC: 109 MG/DL (ref 65–99)
GLUCOSE UR STRIP-MCNC: NEGATIVE MG/DL
HCT VFR BLD AUTO: 46.2 % (ref 37.5–51)
HGB BLD-MCNC: 15.2 G/DL (ref 13–17.7)
HGB UR QL STRIP.AUTO: NEGATIVE
HOLD SPECIMEN: NORMAL
KETONES UR QL STRIP: ABNORMAL
LEUKOCYTE ESTERASE UR QL STRIP.AUTO: NEGATIVE
LYMPHOCYTES # BLD AUTO: 2.5 10*3/MM3 (ref 0.7–3.1)
LYMPHOCYTES NFR BLD AUTO: 19.5 % (ref 19.6–45.3)
MCH RBC QN AUTO: 30.1 PG (ref 26.6–33)
MCHC RBC AUTO-ENTMCNC: 32.9 G/DL (ref 31.5–35.7)
MCV RBC AUTO: 91.4 FL (ref 79–97)
MONOCYTES # BLD AUTO: 1.6 10*3/MM3 (ref 0.1–0.9)
MONOCYTES NFR BLD AUTO: 12.5 % (ref 5–12)
NEUTROPHILS NFR BLD AUTO: 66.3 % (ref 42.7–76)
NEUTROPHILS NFR BLD AUTO: 8.6 10*3/MM3 (ref 1.7–7)
NITRITE UR QL STRIP: NEGATIVE
NRBC BLD AUTO-RTO: 0 /100 WBC (ref 0–0.2)
PH UR STRIP.AUTO: <=5 [PH] (ref 5–8)
PLATELET # BLD AUTO: 228 10*3/MM3 (ref 140–450)
PMV BLD AUTO: 7.4 FL (ref 6–12)
POTASSIUM SERPL-SCNC: 4.2 MMOL/L (ref 3.5–5.2)
PROT SERPL-MCNC: 7.2 G/DL (ref 6–8.5)
PROT UR QL STRIP: ABNORMAL
RBC # BLD AUTO: 5.06 10*6/MM3 (ref 4.14–5.8)
SODIUM SERPL-SCNC: 135 MMOL/L (ref 136–145)
SP GR UR STRIP: 1.03 (ref 1–1.03)
UROBILINOGEN UR QL STRIP: ABNORMAL
WBC NRBC COR # BLD: 13 10*3/MM3 (ref 3.4–10.8)

## 2022-05-25 PROCEDURE — 0 IOPAMIDOL PER 1 ML: Performed by: EMERGENCY MEDICINE

## 2022-05-25 PROCEDURE — 80053 COMPREHEN METABOLIC PANEL: CPT | Performed by: EMERGENCY MEDICINE

## 2022-05-25 PROCEDURE — 25010000002 PIPERACILLIN SOD-TAZOBACTAM PER 1 G: Performed by: EMERGENCY MEDICINE

## 2022-05-25 PROCEDURE — 99283 EMERGENCY DEPT VISIT LOW MDM: CPT

## 2022-05-25 PROCEDURE — 85025 COMPLETE CBC W/AUTO DIFF WBC: CPT | Performed by: EMERGENCY MEDICINE

## 2022-05-25 PROCEDURE — 74177 CT ABD & PELVIS W/CONTRAST: CPT

## 2022-05-25 PROCEDURE — 96375 TX/PRO/DX INJ NEW DRUG ADDON: CPT

## 2022-05-25 PROCEDURE — 25010000002 ONDANSETRON PER 1 MG: Performed by: EMERGENCY MEDICINE

## 2022-05-25 PROCEDURE — 25010000002 KETOROLAC TROMETHAMINE PER 15 MG: Performed by: EMERGENCY MEDICINE

## 2022-05-25 PROCEDURE — 96365 THER/PROPH/DIAG IV INF INIT: CPT

## 2022-05-25 PROCEDURE — 81003 URINALYSIS AUTO W/O SCOPE: CPT | Performed by: EMERGENCY MEDICINE

## 2022-05-25 RX ORDER — ONDANSETRON 2 MG/ML
4 INJECTION INTRAMUSCULAR; INTRAVENOUS ONCE
Status: COMPLETED | OUTPATIENT
Start: 2022-05-25 | End: 2022-05-25

## 2022-05-25 RX ORDER — TRAMADOL HYDROCHLORIDE 50 MG/1
50 TABLET ORAL EVERY 6 HOURS PRN
Qty: 12 TABLET | Refills: 0 | Status: SHIPPED | OUTPATIENT
Start: 2022-05-25

## 2022-05-25 RX ORDER — KETOROLAC TROMETHAMINE 15 MG/ML
15 INJECTION, SOLUTION INTRAMUSCULAR; INTRAVENOUS ONCE
Status: COMPLETED | OUTPATIENT
Start: 2022-05-25 | End: 2022-05-25

## 2022-05-25 RX ORDER — AMOXICILLIN AND CLAVULANATE POTASSIUM 875; 125 MG/1; MG/1
1 TABLET, FILM COATED ORAL EVERY 12 HOURS
Qty: 14 TABLET | Refills: 0 | Status: SHIPPED | OUTPATIENT
Start: 2022-05-25 | End: 2022-05-29 | Stop reason: HOSPADM

## 2022-05-25 RX ORDER — SODIUM CHLORIDE 0.9 % (FLUSH) 0.9 %
10 SYRINGE (ML) INJECTION AS NEEDED
Status: DISCONTINUED | OUTPATIENT
Start: 2022-05-25 | End: 2022-05-25 | Stop reason: HOSPADM

## 2022-05-25 RX ADMIN — KETOROLAC TROMETHAMINE 15 MG: 15 INJECTION, SOLUTION INTRAMUSCULAR; INTRAVENOUS at 15:50

## 2022-05-25 RX ADMIN — IOPAMIDOL 100 ML: 755 INJECTION, SOLUTION INTRAVENOUS at 16:25

## 2022-05-25 RX ADMIN — ONDANSETRON 4 MG: 2 INJECTION INTRAMUSCULAR; INTRAVENOUS at 15:50

## 2022-05-25 RX ADMIN — PIPERACILLIN AND TAZOBACTAM 3.38 G: 3; .375 INJECTION, POWDER, LYOPHILIZED, FOR SOLUTION INTRAVENOUS at 17:07

## 2022-05-25 NOTE — DISCHARGE INSTRUCTIONS
Stay on clear liquids x48 hours.  Follow-up with your doctor Friday for recheck, return new or worsening symptoms,  May return to work 5/28

## 2022-05-25 NOTE — ED PROVIDER NOTES
Subjective   History of Present Illness  Context: 28-year-old male presents with low abdominal pain.  He states even up and down ladder numerous times yesterday.  He states that he started having pain last night when he went to stand up.  He states he felt like there was a pop in his lower abdomen this morning and did have some improvement in pain.  He feels like a pressure over his bladder.  He reports no urinary difficulty although has some feeling of urgency.  He states bowels move twice today without difficulty.  He has had no fever cough or shortness of breath.  Location: Lower abdomen  Quality: Pain  Duration: Onset last evening  Timing: Constant  Severity: Moderate severe   Modifying factors: Worse with movement  Associated signs and symptoms:    Review of Systems   Constitutional: Negative for fever and unexpected weight change.   HENT: Negative for congestion and sore throat.    Eyes: Negative for pain.   Respiratory: Negative for cough and shortness of breath.    Cardiovascular: Negative for chest pain and leg swelling.   Gastrointestinal: Positive for abdominal pain. Negative for diarrhea and vomiting.   Genitourinary: Positive for urgency. Negative for dysuria.   Musculoskeletal: Negative for back pain.   Skin: Negative for rash.   Neurological: Negative for dizziness, weakness and headaches.   Psychiatric/Behavioral: Negative for confusion.       Past Medical History:   Diagnosis Date   • Abdominal pain    • Agitation    • Allergies    • Apnea    • Change in weight    • Chest congestion    • Choking    • Cough    • GERD (gastroesophageal reflux disease)    • High blood pressure    • Obesity    • Sleep disturbance    • Snoring    • Wheezing        Allergies   Allergen Reactions   • Atorvastatin Unknown - Low Severity   • Latex Hives   • Latex Rash   • Tamiflu [Oseltamivir Phosphate] Nausea And Vomiting       Past Surgical History:   Procedure Laterality Date   • CLEFT PALATE REPAIR     • SINUS SURGERY      • TONSILLECTOMY         Family History   Problem Relation Age of Onset   • No Known Problems Mother    • No Known Problems Father    • No Known Problems Brother        Social History     Socioeconomic History   • Marital status: Single   Tobacco Use   • Smoking status: Current Every Day Smoker   • Smokeless tobacco: Never Used   Substance and Sexual Activity   • Alcohol use: Yes     Comment: 5 beers every other week   • Drug use: Never   • Sexual activity: Defer     Only current medications are Prilosec and rosuvastatin      Objective   Physical Exam  36-year-old male awake alert.  Generally obese.  Pupils equal round at light.  Oropharynx mucous memories moist neck supple chest clear equal breath sounds.  Cardiovascular regular rate and rhythm.  Abdomen is soft.  He has suprapubic tenderness.  Skin without rash noted per extremities no tenderness or edema.  Neurologic exam without focal findings noted.  Psychiatric cooperative.  Procedures           ED Course      Results for orders placed or performed during the hospital encounter of 05/25/22   Comprehensive Metabolic Panel    Specimen: Blood   Result Value Ref Range    Glucose 109 (H) 65 - 99 mg/dL    BUN 16 6 - 20 mg/dL    Creatinine 0.79 0.76 - 1.27 mg/dL    Sodium 135 (L) 136 - 145 mmol/L    Potassium 4.2 3.5 - 5.2 mmol/L    Chloride 100 98 - 107 mmol/L    CO2 23.0 22.0 - 29.0 mmol/L    Calcium 8.9 8.6 - 10.5 mg/dL    Total Protein 7.2 6.0 - 8.5 g/dL    Albumin 4.30 3.50 - 5.20 g/dL    ALT (SGPT) 39 1 - 41 U/L    AST (SGOT) 26 1 - 40 U/L    Alkaline Phosphatase 81 39 - 117 U/L    Total Bilirubin 0.6 0.0 - 1.2 mg/dL    Globulin 2.9 gm/dL    A/G Ratio 1.5 g/dL    BUN/Creatinine Ratio 20.3 7.0 - 25.0    Anion Gap 12.0 5.0 - 15.0 mmol/L    eGFR 116.6 >60.0 mL/min/1.73   Urinalysis With Microscopic If Indicated (No Culture) - Urine, Clean Catch    Specimen: Urine, Clean Catch   Result Value Ref Range    Color, UA Orange (A) Yellow, Straw    Appearance, UA Clear  Clear    pH, UA <=5.0 5.0 - 8.0    Specific Gravity, UA 1.030 1.005 - 1.030    Glucose, UA Negative Negative    Ketones, UA Trace (A) Negative    Bilirubin, UA Negative Negative    Blood, UA Negative Negative    Protein, UA Trace (A) Negative    Leuk Esterase, UA Negative Negative    Nitrite, UA Negative Negative    Urobilinogen, UA 1.0 E.U./dL 0.2 - 1.0 E.U./dL   CBC Auto Differential    Specimen: Blood   Result Value Ref Range    WBC 13.00 (H) 3.40 - 10.80 10*3/mm3    RBC 5.06 4.14 - 5.80 10*6/mm3    Hemoglobin 15.2 13.0 - 17.7 g/dL    Hematocrit 46.2 37.5 - 51.0 %    MCV 91.4 79.0 - 97.0 fL    MCH 30.1 26.6 - 33.0 pg    MCHC 32.9 31.5 - 35.7 g/dL    RDW 13.4 12.3 - 15.4 %    RDW-SD 42.0 37.0 - 54.0 fl    MPV 7.4 6.0 - 12.0 fL    Platelets 228 140 - 450 10*3/mm3    Neutrophil % 66.3 42.7 - 76.0 %    Lymphocyte % 19.5 (L) 19.6 - 45.3 %    Monocyte % 12.5 (H) 5.0 - 12.0 %    Eosinophil % 1.2 0.3 - 6.2 %    Basophil % 0.5 0.0 - 1.5 %    Neutrophils, Absolute 8.60 (H) 1.70 - 7.00 10*3/mm3    Lymphocytes, Absolute 2.50 0.70 - 3.10 10*3/mm3    Monocytes, Absolute 1.60 (H) 0.10 - 0.90 10*3/mm3    Eosinophils, Absolute 0.20 0.00 - 0.40 10*3/mm3    Basophils, Absolute 0.10 0.00 - 0.20 10*3/mm3    nRBC 0.0 0.0 - 0.2 /100 WBC     CT Abdomen Pelvis With Contrast    Result Date: 5/25/2022   1. Focal acute sigmoid diverticulitis. No gross perforation or abscess. 2. Inflammation of the urinary bladder. This may be secondary to the aforementioned adjacent sigmoid diverticular inflammation. 3. Mild hepatic enlargement and hepatic steatosis.    Electronically Signed By-Iris Leiva MD On:5/25/2022 4:29 PM This report was finalized on [id number] by  Iris Leiva MD.    Medications   sodium chloride 0.9 % flush 10 mL (has no administration in time range)   piperacillin-tazobactam (ZOSYN) IVPB 3.375 g in 100 mL NS (CD) (has no administration in time range)   ketorolac (TORADOL) injection 15 mg (15 mg Intravenous Given 5/25/22  "1550)   ondansetron (ZOFRAN) injection 4 mg (4 mg Intravenous Given 5/25/22 1550)   iopamidol (ISOVUE-370) 76 % injection 100 mL (100 mL Intravenous Given 5/25/22 1625)     /96 (BP Location: Left arm)   Pulse 112   Temp 98 °F (36.7 °C) (Temporal)   Resp 20   Ht 180.3 cm (71\")   Wt 121 kg (267 lb 10.2 oz)   SpO2 95%   BMI 37.33 kg/m²                                              [facility]  Chart review: Patient was treated for COVID January of this year  Comorbidity: As per past history  Differential: Abdominal wall strain, hernia, UTI, appendicitis, diverticulitis  My EKG interpretation:   Lab: White count 13,000 with 66 segs no bands urinalysis no leukocytes no blood.  Compass metabolic panel essentially normal glucose 109 sodium 135  Radiology: I reviewed CT scan of abdomen and pelvis there is focal acute sigmoid diverticulitis with no perforation or abscess.  There is some inflammation in the urinary bladder that appears to be secondary to adjacent diverticular inflammation.  There is mild hepatic enlargement and hepatic steatosis noted.  Discussion/treatment: Patient had IV placed.  He was given ketorolac and Zofran.  He had findings discussed with him.  He was given Zosyn.  He was discharged placed on Augmentin.  He was advised to stay on liquid diet for the next 48 hours.  To follow-up with his prime provider Friday for recheck, his inspect report was reviewed he was given Ultram for pain.  Patient was evaluated using appropriate PPE      Final diagnoses:   Lower abdominal pain   Acute diverticulitis       ED Disposition  ED Disposition     ED Disposition   Discharge    Condition   Stable    Comment   --             Pearl Russell MD  [address] [facility] [address] [phone]    Schedule an appointment as soon as possible for a visit   Friday for recheck         Medication List      New Prescriptions    amoxicillin-clavulanate 875-125 MG per tablet  Commonly known as: AUGMENTIN  Take 1 tablet by " mouth Every 12 (Twelve) Hours.     traMADol 50 MG tablet  Commonly known as: Ultram  Take 1 tablet by mouth Every 6 (Six) Hours As Needed for Moderate Pain .           Where to Get Your Medications      These medications were sent to [facility]/pharmacy #[id number] - [place], [place] - [address] - [phone]  - [phone]   [address] [place] [address]    Hours: 24-hours Phone: [phone]   · amoxicillin-clavulanate 875-125 MG per tablet  · traMADol 50 MG tablet          Sahil Arceo MD  05/25/22 4295

## 2022-05-27 ENCOUNTER — APPOINTMENT (OUTPATIENT)
Dept: CT IMAGING | Facility: HOSPITAL | Age: 37
End: 2022-05-27

## 2022-05-27 ENCOUNTER — HOSPITAL ENCOUNTER (INPATIENT)
Facility: HOSPITAL | Age: 37
LOS: 2 days | Discharge: HOME | End: 2022-05-29
Attending: EMERGENCY MEDICINE | Admitting: INTERNAL MEDICINE

## 2022-05-27 DIAGNOSIS — K57.92 DIVERTICULITIS: ICD-10-CM

## 2022-05-27 DIAGNOSIS — K57.20 PERFORATION OF SIGMOID COLON DUE TO DIVERTICULITIS: ICD-10-CM

## 2022-05-27 DIAGNOSIS — R10.30 LOWER ABDOMINAL PAIN: Primary | ICD-10-CM

## 2022-05-27 LAB
ALBUMIN SERPL-MCNC: 4 G/DL (ref 3.5–5.2)
ALBUMIN/GLOB SERPL: 1.2 G/DL
ALP SERPL-CCNC: 70 U/L (ref 39–117)
ALT SERPL W P-5'-P-CCNC: 54 U/L (ref 1–41)
ANION GAP SERPL CALCULATED.3IONS-SCNC: 12 MMOL/L (ref 5–15)
AST SERPL-CCNC: 35 U/L (ref 1–40)
BASOPHILS # BLD AUTO: 0.1 10*3/MM3 (ref 0–0.2)
BASOPHILS NFR BLD AUTO: 0.8 % (ref 0–1.5)
BILIRUB SERPL-MCNC: <0.2 MG/DL (ref 0–1.2)
BILIRUB UR QL STRIP: NEGATIVE
BUN SERPL-MCNC: 18 MG/DL (ref 6–20)
BUN/CREAT SERPL: 21.7 (ref 7–25)
CALCIUM SPEC-SCNC: 9.5 MG/DL (ref 8.6–10.5)
CHLORIDE SERPL-SCNC: 101 MMOL/L (ref 98–107)
CLARITY UR: CLEAR
CO2 SERPL-SCNC: 24 MMOL/L (ref 22–29)
COLOR UR: YELLOW
CREAT SERPL-MCNC: 0.83 MG/DL (ref 0.76–1.27)
DEPRECATED RDW RBC AUTO: 41.6 FL (ref 37–54)
EGFRCR SERPLBLD CKD-EPI 2021: 114.9 ML/MIN/1.73
EOSINOPHIL # BLD AUTO: 0.3 10*3/MM3 (ref 0–0.4)
EOSINOPHIL NFR BLD AUTO: 4 % (ref 0.3–6.2)
ERYTHROCYTE [DISTWIDTH] IN BLOOD BY AUTOMATED COUNT: 13 % (ref 12.3–15.4)
GLOBULIN UR ELPH-MCNC: 3.3 GM/DL
GLUCOSE SERPL-MCNC: 94 MG/DL (ref 65–99)
GLUCOSE UR STRIP-MCNC: NEGATIVE MG/DL
HCT VFR BLD AUTO: 45.9 % (ref 37.5–51)
HGB BLD-MCNC: 15.2 G/DL (ref 13–17.7)
HGB UR QL STRIP.AUTO: NEGATIVE
KETONES UR QL STRIP: NEGATIVE
LEUKOCYTE ESTERASE UR QL STRIP.AUTO: NEGATIVE
LIPASE SERPL-CCNC: 41 U/L (ref 13–60)
LYMPHOCYTES # BLD AUTO: 2.7 10*3/MM3 (ref 0.7–3.1)
LYMPHOCYTES NFR BLD AUTO: 39.4 % (ref 19.6–45.3)
MCH RBC QN AUTO: 29.9 PG (ref 26.6–33)
MCHC RBC AUTO-ENTMCNC: 33 G/DL (ref 31.5–35.7)
MCV RBC AUTO: 90.5 FL (ref 79–97)
MONOCYTES # BLD AUTO: 0.7 10*3/MM3 (ref 0.1–0.9)
MONOCYTES NFR BLD AUTO: 10.4 % (ref 5–12)
NEUTROPHILS NFR BLD AUTO: 3.1 10*3/MM3 (ref 1.7–7)
NEUTROPHILS NFR BLD AUTO: 45.4 % (ref 42.7–76)
NITRITE UR QL STRIP: NEGATIVE
NRBC BLD AUTO-RTO: 0.2 /100 WBC (ref 0–0.2)
PH UR STRIP.AUTO: <=5 [PH] (ref 5–8)
PLATELET # BLD AUTO: 269 10*3/MM3 (ref 140–450)
PMV BLD AUTO: 7.3 FL (ref 6–12)
POTASSIUM SERPL-SCNC: 4.4 MMOL/L (ref 3.5–5.2)
PROT SERPL-MCNC: 7.3 G/DL (ref 6–8.5)
PROT UR QL STRIP: NEGATIVE
RBC # BLD AUTO: 5.07 10*6/MM3 (ref 4.14–5.8)
SODIUM SERPL-SCNC: 137 MMOL/L (ref 136–145)
SP GR UR STRIP: 1.02 (ref 1–1.03)
UROBILINOGEN UR QL STRIP: NORMAL
WBC NRBC COR # BLD: 6.9 10*3/MM3 (ref 3.4–10.8)

## 2022-05-27 PROCEDURE — 81003 URINALYSIS AUTO W/O SCOPE: CPT | Performed by: EMERGENCY MEDICINE

## 2022-05-27 PROCEDURE — 0 MORPHINE SULFATE 4 MG/ML SOLUTION: Performed by: EMERGENCY MEDICINE

## 2022-05-27 PROCEDURE — 74177 CT ABD & PELVIS W/CONTRAST: CPT

## 2022-05-27 PROCEDURE — 85025 COMPLETE CBC W/AUTO DIFF WBC: CPT | Performed by: EMERGENCY MEDICINE

## 2022-05-27 PROCEDURE — 99283 EMERGENCY DEPT VISIT LOW MDM: CPT

## 2022-05-27 PROCEDURE — 80053 COMPREHEN METABOLIC PANEL: CPT | Performed by: EMERGENCY MEDICINE

## 2022-05-27 PROCEDURE — 25010000002 PIPERACILLIN SOD-TAZOBACTAM PER 1 G: Performed by: EMERGENCY MEDICINE

## 2022-05-27 PROCEDURE — 0 IOPAMIDOL PER 1 ML: Performed by: EMERGENCY MEDICINE

## 2022-05-27 PROCEDURE — 25010000002 ONDANSETRON PER 1 MG: Performed by: EMERGENCY MEDICINE

## 2022-05-27 PROCEDURE — 99221 1ST HOSP IP/OBS SF/LOW 40: CPT | Performed by: NURSE PRACTITIONER

## 2022-05-27 PROCEDURE — 83690 ASSAY OF LIPASE: CPT | Performed by: EMERGENCY MEDICINE

## 2022-05-27 RX ORDER — ONDANSETRON 2 MG/ML
4 INJECTION INTRAMUSCULAR; INTRAVENOUS EVERY 6 HOURS PRN
Status: DISCONTINUED | OUTPATIENT
Start: 2022-05-27 | End: 2022-05-29 | Stop reason: HOSPADM

## 2022-05-27 RX ORDER — ONDANSETRON 2 MG/ML
4 INJECTION INTRAMUSCULAR; INTRAVENOUS ONCE
Status: COMPLETED | OUTPATIENT
Start: 2022-05-27 | End: 2022-05-27

## 2022-05-27 RX ORDER — MORPHINE SULFATE 4 MG/ML
4 INJECTION, SOLUTION INTRAMUSCULAR; INTRAVENOUS ONCE
Status: COMPLETED | OUTPATIENT
Start: 2022-05-27 | End: 2022-05-27

## 2022-05-27 RX ORDER — SODIUM CHLORIDE 9 MG/ML
100 INJECTION, SOLUTION INTRAVENOUS CONTINUOUS
Status: DISCONTINUED | OUTPATIENT
Start: 2022-05-27 | End: 2022-05-29 | Stop reason: HOSPADM

## 2022-05-27 RX ORDER — SODIUM CHLORIDE 0.9 % (FLUSH) 0.9 %
10 SYRINGE (ML) INJECTION AS NEEDED
Status: DISCONTINUED | OUTPATIENT
Start: 2022-05-27 | End: 2022-05-29 | Stop reason: HOSPADM

## 2022-05-27 RX ORDER — HYDRALAZINE HYDROCHLORIDE 20 MG/ML
10 INJECTION INTRAMUSCULAR; INTRAVENOUS EVERY 6 HOURS PRN
Status: DISCONTINUED | OUTPATIENT
Start: 2022-05-27 | End: 2022-05-29 | Stop reason: HOSPADM

## 2022-05-27 RX ADMIN — PIPERACILLIN AND TAZOBACTAM 4.5 G: 4; .5 INJECTION, POWDER, FOR SOLUTION INTRAVENOUS at 23:39

## 2022-05-27 RX ADMIN — ONDANSETRON 4 MG: 2 INJECTION INTRAMUSCULAR; INTRAVENOUS at 21:55

## 2022-05-27 RX ADMIN — MORPHINE SULFATE 4 MG: 4 INJECTION INTRAVENOUS at 23:39

## 2022-05-27 RX ADMIN — IOPAMIDOL 100 ML: 755 INJECTION, SOLUTION INTRAVENOUS at 22:40

## 2022-05-27 RX ADMIN — MORPHINE SULFATE 4 MG: 4 INJECTION INTRAVENOUS at 21:55

## 2022-05-27 RX ADMIN — SODIUM CHLORIDE, POTASSIUM CHLORIDE, SODIUM LACTATE AND CALCIUM CHLORIDE 1000 ML: 600; 310; 30; 20 INJECTION, SOLUTION INTRAVENOUS at 21:55

## 2022-05-28 LAB
ANION GAP SERPL CALCULATED.3IONS-SCNC: 12 MMOL/L (ref 5–15)
BASOPHILS # BLD AUTO: 0 10*3/MM3 (ref 0–0.2)
BASOPHILS NFR BLD AUTO: 0.6 % (ref 0–1.5)
BUN SERPL-MCNC: 17 MG/DL (ref 6–20)
BUN/CREAT SERPL: 19.5 (ref 7–25)
CALCIUM SPEC-SCNC: 9.5 MG/DL (ref 8.6–10.5)
CHLORIDE SERPL-SCNC: 101 MMOL/L (ref 98–107)
CO2 SERPL-SCNC: 24 MMOL/L (ref 22–29)
CREAT SERPL-MCNC: 0.87 MG/DL (ref 0.76–1.27)
DEPRECATED RDW RBC AUTO: 43.3 FL (ref 37–54)
EGFRCR SERPLBLD CKD-EPI 2021: 113.3 ML/MIN/1.73
EOSINOPHIL # BLD AUTO: 0.3 10*3/MM3 (ref 0–0.4)
EOSINOPHIL NFR BLD AUTO: 4.5 % (ref 0.3–6.2)
ERYTHROCYTE [DISTWIDTH] IN BLOOD BY AUTOMATED COUNT: 13.5 % (ref 12.3–15.4)
GLUCOSE SERPL-MCNC: 96 MG/DL (ref 65–99)
HCT VFR BLD AUTO: 42 % (ref 37.5–51)
HGB BLD-MCNC: 14 G/DL (ref 13–17.7)
LYMPHOCYTES # BLD AUTO: 2.9 10*3/MM3 (ref 0.7–3.1)
LYMPHOCYTES NFR BLD AUTO: 44 % (ref 19.6–45.3)
MCH RBC QN AUTO: 30.7 PG (ref 26.6–33)
MCHC RBC AUTO-ENTMCNC: 33.4 G/DL (ref 31.5–35.7)
MCV RBC AUTO: 91.7 FL (ref 79–97)
MONOCYTES # BLD AUTO: 0.7 10*3/MM3 (ref 0.1–0.9)
MONOCYTES NFR BLD AUTO: 10.1 % (ref 5–12)
NEUTROPHILS NFR BLD AUTO: 2.7 10*3/MM3 (ref 1.7–7)
NEUTROPHILS NFR BLD AUTO: 40.8 % (ref 42.7–76)
NRBC BLD AUTO-RTO: 0.1 /100 WBC (ref 0–0.2)
PLATELET # BLD AUTO: 272 10*3/MM3 (ref 140–450)
PMV BLD AUTO: 7.1 FL (ref 6–12)
POTASSIUM SERPL-SCNC: 4.5 MMOL/L (ref 3.5–5.2)
RBC # BLD AUTO: 4.58 10*6/MM3 (ref 4.14–5.8)
SARS-COV-2 RNA PNL SPEC NAA+PROBE: NOT DETECTED
SODIUM SERPL-SCNC: 137 MMOL/L (ref 136–145)
WBC NRBC COR # BLD: 6.7 10*3/MM3 (ref 3.4–10.8)

## 2022-05-28 PROCEDURE — 25010000002 MORPHINE PER 10 MG: Performed by: INTERNAL MEDICINE

## 2022-05-28 PROCEDURE — 36415 COLL VENOUS BLD VENIPUNCTURE: CPT | Performed by: NURSE PRACTITIONER

## 2022-05-28 PROCEDURE — 85025 COMPLETE CBC W/AUTO DIFF WBC: CPT | Performed by: NURSE PRACTITIONER

## 2022-05-28 PROCEDURE — 87635 SARS-COV-2 COVID-19 AMP PRB: CPT | Performed by: EMERGENCY MEDICINE

## 2022-05-28 PROCEDURE — 25010000002 ONDANSETRON PER 1 MG: Performed by: NURSE PRACTITIONER

## 2022-05-28 PROCEDURE — 25010000002 PIPERACILLIN SOD-TAZOBACTAM PER 1 G: Performed by: INTERNAL MEDICINE

## 2022-05-28 PROCEDURE — 0 MORPHINE SULFATE 4 MG/ML SOLUTION: Performed by: INTERNAL MEDICINE

## 2022-05-28 PROCEDURE — 80048 BASIC METABOLIC PNL TOTAL CA: CPT | Performed by: NURSE PRACTITIONER

## 2022-05-28 PROCEDURE — 25010000002 PIPERACILLIN SOD-TAZOBACTAM PER 1 G: Performed by: NURSE PRACTITIONER

## 2022-05-28 PROCEDURE — 99233 SBSQ HOSP IP/OBS HIGH 50: CPT | Performed by: SURGERY

## 2022-05-28 PROCEDURE — 99232 SBSQ HOSP IP/OBS MODERATE 35: CPT | Performed by: INTERNAL MEDICINE

## 2022-05-28 RX ORDER — ACETAMINOPHEN 325 MG/1
650 TABLET ORAL EVERY 6 HOURS PRN
Status: DISCONTINUED | OUTPATIENT
Start: 2022-05-28 | End: 2022-05-29 | Stop reason: HOSPADM

## 2022-05-28 RX ORDER — NICOTINE 21 MG/24HR
1 PATCH, TRANSDERMAL 24 HOURS TRANSDERMAL
Status: DISCONTINUED | OUTPATIENT
Start: 2022-05-28 | End: 2022-05-29 | Stop reason: HOSPADM

## 2022-05-28 RX ORDER — MORPHINE SULFATE 4 MG/ML
4 INJECTION, SOLUTION INTRAMUSCULAR; INTRAVENOUS EVERY 4 HOURS PRN
Status: DISCONTINUED | OUTPATIENT
Start: 2022-05-28 | End: 2022-05-29 | Stop reason: HOSPADM

## 2022-05-28 RX ORDER — ROSUVASTATIN CALCIUM 20 MG/1
10 TABLET, COATED ORAL NIGHTLY
COMMUNITY

## 2022-05-28 RX ORDER — ROSUVASTATIN CALCIUM 10 MG/1
10 TABLET, COATED ORAL NIGHTLY
Status: DISCONTINUED | OUTPATIENT
Start: 2022-05-28 | End: 2022-05-29 | Stop reason: HOSPADM

## 2022-05-28 RX ORDER — HYDROCODONE BITARTRATE AND ACETAMINOPHEN 5; 325 MG/1; MG/1
1 TABLET ORAL EVERY 6 HOURS PRN
Status: DISCONTINUED | OUTPATIENT
Start: 2022-05-28 | End: 2022-05-29 | Stop reason: HOSPADM

## 2022-05-28 RX ORDER — ONDANSETRON HYDROCHLORIDE 8 MG/1
8 TABLET, FILM COATED ORAL EVERY 6 HOURS PRN
COMMUNITY

## 2022-05-28 RX ORDER — PANTOPRAZOLE SODIUM 40 MG/1
40 TABLET, DELAYED RELEASE ORAL EVERY MORNING
Status: DISCONTINUED | OUTPATIENT
Start: 2022-05-29 | End: 2022-05-29 | Stop reason: HOSPADM

## 2022-05-28 RX ORDER — TRAMADOL HYDROCHLORIDE 50 MG/1
50 TABLET ORAL EVERY 6 HOURS PRN
Status: DISCONTINUED | OUTPATIENT
Start: 2022-05-28 | End: 2022-05-29 | Stop reason: HOSPADM

## 2022-05-28 RX ADMIN — PIPERACILLIN AND TAZOBACTAM 4.5 G: 4; .5 INJECTION, POWDER, FOR SOLUTION INTRAVENOUS at 04:31

## 2022-05-28 RX ADMIN — MORPHINE SULFATE 4 MG: 4 INJECTION INTRAVENOUS at 04:29

## 2022-05-28 RX ADMIN — SODIUM CHLORIDE 100 ML/HR: 9 INJECTION, SOLUTION INTRAVENOUS at 04:28

## 2022-05-28 RX ADMIN — ONDANSETRON 4 MG: 2 INJECTION INTRAMUSCULAR; INTRAVENOUS at 10:21

## 2022-05-28 RX ADMIN — NICOTINE 1 PATCH: 14 PATCH, EXTENDED RELEASE TRANSDERMAL at 04:30

## 2022-05-28 RX ADMIN — ONDANSETRON 4 MG: 2 INJECTION INTRAMUSCULAR; INTRAVENOUS at 22:30

## 2022-05-28 RX ADMIN — MORPHINE SULFATE 4 MG: 4 INJECTION INTRAVENOUS at 10:20

## 2022-05-28 RX ADMIN — MORPHINE SULFATE 4 MG: 4 INJECTION INTRAVENOUS at 20:34

## 2022-05-28 RX ADMIN — MORPHINE SULFATE 4 MG: 4 INJECTION INTRAVENOUS at 16:16

## 2022-05-28 RX ADMIN — PIPERACILLIN AND TAZOBACTAM 4.5 G: 4; .5 INJECTION, POWDER, FOR SOLUTION INTRAVENOUS at 13:26

## 2022-05-28 RX ADMIN — ONDANSETRON 4 MG: 2 INJECTION INTRAMUSCULAR; INTRAVENOUS at 04:29

## 2022-05-28 RX ADMIN — Medication 10 ML: at 10:21

## 2022-05-28 RX ADMIN — PIPERACILLIN AND TAZOBACTAM 4.5 G: 4; .5 INJECTION, POWDER, FOR SOLUTION INTRAVENOUS at 21:27

## 2022-05-28 RX ADMIN — Medication 10 ML: at 20:35

## 2022-05-28 RX ADMIN — ROSUVASTATIN 10 MG: 10 TABLET, FILM COATED ORAL at 20:34

## 2022-05-29 ENCOUNTER — READMISSION MANAGEMENT (OUTPATIENT)
Dept: CALL CENTER | Facility: HOSPITAL | Age: 37
End: 2022-05-29

## 2022-05-29 VITALS
WEIGHT: 267 LBS | BODY MASS INDEX: 37.38 KG/M2 | TEMPERATURE: 97.7 F | OXYGEN SATURATION: 97 % | SYSTOLIC BLOOD PRESSURE: 120 MMHG | HEIGHT: 71 IN | RESPIRATION RATE: 16 BRPM | HEART RATE: 83 BPM | DIASTOLIC BLOOD PRESSURE: 90 MMHG

## 2022-05-29 PROBLEM — K57.92 DIVERTICULITIS: Status: ACTIVE | Noted: 2022-05-29

## 2022-05-29 LAB
ANION GAP SERPL CALCULATED.3IONS-SCNC: 10 MMOL/L (ref 5–15)
BASOPHILS # BLD AUTO: 0 10*3/MM3 (ref 0–0.2)
BASOPHILS NFR BLD AUTO: 0.6 % (ref 0–1.5)
BUN SERPL-MCNC: 13 MG/DL (ref 6–20)
BUN/CREAT SERPL: 14.4 (ref 7–25)
CALCIUM SPEC-SCNC: 8.9 MG/DL (ref 8.6–10.5)
CHLORIDE SERPL-SCNC: 99 MMOL/L (ref 98–107)
CO2 SERPL-SCNC: 27 MMOL/L (ref 22–29)
CREAT SERPL-MCNC: 0.9 MG/DL (ref 0.76–1.27)
DEPRECATED RDW RBC AUTO: 40.7 FL (ref 37–54)
EGFRCR SERPLBLD CKD-EPI 2021: 112.1 ML/MIN/1.73
EOSINOPHIL # BLD AUTO: 0.3 10*3/MM3 (ref 0–0.4)
EOSINOPHIL NFR BLD AUTO: 4 % (ref 0.3–6.2)
ERYTHROCYTE [DISTWIDTH] IN BLOOD BY AUTOMATED COUNT: 12.7 % (ref 12.3–15.4)
GLUCOSE SERPL-MCNC: 90 MG/DL (ref 65–99)
HCT VFR BLD AUTO: 42.7 % (ref 37.5–51)
HGB BLD-MCNC: 14.5 G/DL (ref 13–17.7)
LYMPHOCYTES # BLD AUTO: 2.4 10*3/MM3 (ref 0.7–3.1)
LYMPHOCYTES NFR BLD AUTO: 38.5 % (ref 19.6–45.3)
MCH RBC QN AUTO: 31.2 PG (ref 26.6–33)
MCHC RBC AUTO-ENTMCNC: 34.1 G/DL (ref 31.5–35.7)
MCV RBC AUTO: 91.6 FL (ref 79–97)
MONOCYTES # BLD AUTO: 0.6 10*3/MM3 (ref 0.1–0.9)
MONOCYTES NFR BLD AUTO: 9.6 % (ref 5–12)
NEUTROPHILS NFR BLD AUTO: 3 10*3/MM3 (ref 1.7–7)
NEUTROPHILS NFR BLD AUTO: 47.3 % (ref 42.7–76)
NRBC BLD AUTO-RTO: 0.1 /100 WBC (ref 0–0.2)
PLATELET # BLD AUTO: 263 10*3/MM3 (ref 140–450)
PMV BLD AUTO: 7.1 FL (ref 6–12)
POTASSIUM SERPL-SCNC: 4.6 MMOL/L (ref 3.5–5.2)
RBC # BLD AUTO: 4.66 10*6/MM3 (ref 4.14–5.8)
SODIUM SERPL-SCNC: 136 MMOL/L (ref 136–145)
WBC NRBC COR # BLD: 6.3 10*3/MM3 (ref 3.4–10.8)

## 2022-05-29 PROCEDURE — 99239 HOSP IP/OBS DSCHRG MGMT >30: CPT | Performed by: INTERNAL MEDICINE

## 2022-05-29 PROCEDURE — 36415 COLL VENOUS BLD VENIPUNCTURE: CPT | Performed by: NURSE PRACTITIONER

## 2022-05-29 PROCEDURE — 80048 BASIC METABOLIC PNL TOTAL CA: CPT | Performed by: NURSE PRACTITIONER

## 2022-05-29 PROCEDURE — 99231 SBSQ HOSP IP/OBS SF/LOW 25: CPT | Performed by: SURGERY

## 2022-05-29 PROCEDURE — 85025 COMPLETE CBC W/AUTO DIFF WBC: CPT | Performed by: NURSE PRACTITIONER

## 2022-05-29 PROCEDURE — 25010000002 PIPERACILLIN SOD-TAZOBACTAM PER 1 G: Performed by: INTERNAL MEDICINE

## 2022-05-29 RX ORDER — METRONIDAZOLE 500 MG/1
500 TABLET ORAL 3 TIMES DAILY
Qty: 24 TABLET | Refills: 0 | Status: SHIPPED | OUTPATIENT
Start: 2022-05-29 | End: 2022-06-06

## 2022-05-29 RX ORDER — LEVOFLOXACIN 500 MG/1
500 TABLET, FILM COATED ORAL DAILY
Qty: 8 TABLET | Refills: 0 | Status: SHIPPED | OUTPATIENT
Start: 2022-05-29 | End: 2022-06-06

## 2022-05-29 RX ADMIN — Medication 10 ML: at 08:14

## 2022-05-29 RX ADMIN — NICOTINE 1 PATCH: 14 PATCH, EXTENDED RELEASE TRANSDERMAL at 08:15

## 2022-05-29 RX ADMIN — PIPERACILLIN AND TAZOBACTAM 4.5 G: 4; .5 INJECTION, POWDER, FOR SOLUTION INTRAVENOUS at 12:30

## 2022-05-29 RX ADMIN — PIPERACILLIN AND TAZOBACTAM 4.5 G: 4; .5 INJECTION, POWDER, FOR SOLUTION INTRAVENOUS at 05:45

## 2022-05-29 RX ADMIN — PANTOPRAZOLE SODIUM 40 MG: 40 TABLET, DELAYED RELEASE ORAL at 08:14

## 2022-05-30 ENCOUNTER — HOSPITAL ENCOUNTER (OUTPATIENT)
Dept: SLEEP MEDICINE | Facility: HOSPITAL | Age: 37
Discharge: HOME | End: 2022-05-30
Admitting: FAMILY MEDICINE

## 2022-05-30 DIAGNOSIS — G47.8 NON-RESTORATIVE SLEEP: ICD-10-CM

## 2022-05-30 DIAGNOSIS — G47.30 SLEEP APNEA, UNSPECIFIED TYPE: ICD-10-CM

## 2022-05-30 DIAGNOSIS — G25.81 RLS (RESTLESS LEGS SYNDROME): ICD-10-CM

## 2022-05-30 DIAGNOSIS — G47.10 HYPERSOMNIA: ICD-10-CM

## 2022-05-30 PROCEDURE — 95810 POLYSOM 6/> YRS 4/> PARAM: CPT

## 2022-05-30 PROCEDURE — 95810 POLYSOM 6/> YRS 4/> PARAM: CPT | Performed by: FAMILY MEDICINE

## 2022-05-30 NOTE — OUTREACH NOTE
Prep Survey    Flowsheet Row Responses   [facility] facility patient discharged from? [facility]   Is LACE score < 7 ? No   Emergency Room discharge w/ pulse ox? No   Eligibility Readm Mgmt   Discharge diagnosis Diverticulitis   Does the patient have one of the following disease processes/diagnoses(primary or secondary)? Other   Does the patient have Home health ordered? No   Is there a DME ordered? No   Prep survey completed? Yes          NUNO MCDERMOTT - Registered Nurse

## 2022-06-02 ENCOUNTER — READMISSION MANAGEMENT (OUTPATIENT)
Dept: CALL CENTER | Facility: HOSPITAL | Age: 37
End: 2022-06-02

## 2022-06-02 NOTE — OUTREACH NOTE
Medical Week 1 Survey    Flowsheet Row Responses   [facility] patient discharged from? [facility]   Does the patient have one of the following disease processes/diagnoses(primary or secondary)? Other   Week 1 attempt successful? Yes   Call start time 1537   Call end time 1539   Discharge diagnosis Diverticulitis   Meds reviewed with patient/caregiver? Yes   Is the patient having any side effects they believe may be caused by any medication additions or changes? No   Does the patient have all medications ordered at discharge? Yes   Is the patient taking all medications as directed (includes completed medication regime)? Yes   Does the patient have a primary care provider?  Yes   Does the patient have an appointment with their PCP within 7 days of discharge? No   What is preventing the patient from scheduling follow up appointments within 7 days of discharge? Haven't had time   Nursing Interventions Advised patient to make appointment, Educated patient on importance of making appointment   Has the patient kept scheduled appointments due by today? N/A   Has home health visited the patient within 72 hours of discharge? N/A   Psychosocial issues? No   Did the patient receive a copy of their discharge instructions? Yes   Nursing interventions Reviewed instructions with patient   What is the patient's perception of their health status since discharge? Improving   Is the patient/caregiver able to teach back signs and symptoms related to disease process for when to call PCP? Yes   Is the patient/caregiver able to teach back signs and symptoms related to disease process for when to call 911? Yes   Is the patient/caregiver able to teach back the hierarchy of who to call/visit for symptoms/problems? PCP, Specialist, Home health nurse, Urgent Care, ED, 911 Yes   If the patient is a current smoker, are they able to teach back resources for cessation? Not a smoker   Week 1 call completed? Yes   Wrap up additional comments Patient  reports he is returning to work tomorrow.           GABRIELA JOHN - Registered Nurse

## 2022-06-03 ENCOUNTER — TELEPHONE (OUTPATIENT)
Dept: SURGERY | Facility: CLINIC | Age: 37
End: 2022-06-03

## 2022-06-03 NOTE — TELEPHONE ENCOUNTER
Caller: PATTIECASTRO 1985    Relationship: SELF     Best call back number: [phone]    What is the best time to reach you: ANYTIME     Who are you requesting to speak with (clinical staff, provider,  specific staff member): CLINICAL STAFF      What was the call regarding: PT CALLED TO SCHEDULE A FOLLOW UP APPT WITH  - PT WOULD LIKE  TO SPEAK WITH THE NURSE IN REGARDS TO IF A CT SCAN  [place] BE NEEDED .[facility] SCHEDULED FOLLOW UP 6/17    Do you require a callback: YES

## 2022-06-06 DIAGNOSIS — G47.34 SLEEP RELATED HYPOXIA: ICD-10-CM

## 2022-06-06 DIAGNOSIS — E66.9 OBESITY (BMI 30-39.9): ICD-10-CM

## 2022-06-06 DIAGNOSIS — G47.33 SEVERE OBSTRUCTIVE SLEEP APNEA: Primary | ICD-10-CM

## 2022-07-05 ENCOUNTER — OFFICE (AMBULATORY)
Dept: URBAN - METROPOLITAN AREA CLINIC 64 | Facility: CLINIC | Age: 37
End: 2022-07-05

## 2022-07-05 VITALS
HEIGHT: 71 IN | WEIGHT: 266 LBS | HEART RATE: 97 BPM | SYSTOLIC BLOOD PRESSURE: 161 MMHG | DIASTOLIC BLOOD PRESSURE: 110 MMHG

## 2022-07-05 DIAGNOSIS — K57.32 DIVERTICULITIS OF LARGE INTESTINE WITHOUT PERFORATION OR ABS: ICD-10-CM

## 2022-07-05 PROCEDURE — 99203 OFFICE O/P NEW LOW 30 MIN: CPT | Performed by: INTERNAL MEDICINE

## 2022-07-08 ENCOUNTER — OFFICE (AMBULATORY)
Dept: URBAN - METROPOLITAN AREA PATHOLOGY 4 | Facility: PATHOLOGY | Age: 37
End: 2022-07-08

## 2022-07-08 ENCOUNTER — ON CAMPUS - OUTPATIENT (AMBULATORY)
Dept: URBAN - METROPOLITAN AREA HOSPITAL 2 | Facility: HOSPITAL | Age: 37
End: 2022-07-08

## 2022-07-08 VITALS
SYSTOLIC BLOOD PRESSURE: 137 MMHG | OXYGEN SATURATION: 92 % | HEART RATE: 100 BPM | HEART RATE: 86 BPM | TEMPERATURE: 98.4 F | SYSTOLIC BLOOD PRESSURE: 130 MMHG | SYSTOLIC BLOOD PRESSURE: 164 MMHG | DIASTOLIC BLOOD PRESSURE: 88 MMHG | SYSTOLIC BLOOD PRESSURE: 141 MMHG | OXYGEN SATURATION: 94 % | DIASTOLIC BLOOD PRESSURE: 99 MMHG | OXYGEN SATURATION: 99 % | OXYGEN SATURATION: 95 % | SYSTOLIC BLOOD PRESSURE: 143 MMHG | DIASTOLIC BLOOD PRESSURE: 91 MMHG | HEART RATE: 99 BPM | DIASTOLIC BLOOD PRESSURE: 90 MMHG | SYSTOLIC BLOOD PRESSURE: 133 MMHG | SYSTOLIC BLOOD PRESSURE: 118 MMHG | DIASTOLIC BLOOD PRESSURE: 97 MMHG | HEART RATE: 98 BPM | WEIGHT: 260 LBS | HEART RATE: 103 BPM | DIASTOLIC BLOOD PRESSURE: 81 MMHG | RESPIRATION RATE: 18 BRPM | OXYGEN SATURATION: 97 % | OXYGEN SATURATION: 93 % | HEIGHT: 71 IN | RESPIRATION RATE: 16 BRPM | DIASTOLIC BLOOD PRESSURE: 98 MMHG | HEART RATE: 84 BPM

## 2022-07-08 DIAGNOSIS — R10.32 LEFT LOWER QUADRANT PAIN: ICD-10-CM

## 2022-07-08 DIAGNOSIS — K63.5 POLYP OF COLON: ICD-10-CM

## 2022-07-08 DIAGNOSIS — D12.2 BENIGN NEOPLASM OF ASCENDING COLON: ICD-10-CM

## 2022-07-08 DIAGNOSIS — R93.3 ABNORMAL FINDINGS ON DIAGNOSTIC IMAGING OF OTHER PARTS OF DI: ICD-10-CM

## 2022-07-08 DIAGNOSIS — K64.1 SECOND DEGREE HEMORRHOIDS: ICD-10-CM

## 2022-07-08 DIAGNOSIS — K57.30 DIVERTICULOSIS OF LARGE INTESTINE WITHOUT PERFORATION OR ABS: ICD-10-CM

## 2022-07-08 DIAGNOSIS — D12.3 BENIGN NEOPLASM OF TRANSVERSE COLON: ICD-10-CM

## 2022-07-08 LAB
GI HISTOLOGY: A. UNSPECIFIED: (no result)
GI HISTOLOGY: B. UNSPECIFIED: (no result)
GI HISTOLOGY: C. UNSPECIFIED: (no result)
GI HISTOLOGY: PDF REPORT: (no result)

## 2022-07-08 PROCEDURE — 45385 COLONOSCOPY W/LESION REMOVAL: CPT | Performed by: INTERNAL MEDICINE

## 2022-07-08 PROCEDURE — 45380 COLONOSCOPY AND BIOPSY: CPT | Mod: 59 | Performed by: INTERNAL MEDICINE

## 2022-07-08 PROCEDURE — 88305 TISSUE EXAM BY PATHOLOGIST: CPT | Performed by: INTERNAL MEDICINE

## 2023-08-25 ENCOUNTER — APPOINTMENT (OUTPATIENT)
Dept: CT IMAGING | Facility: HOSPITAL | Age: 38
End: 2023-08-25
Payer: COMMERCIAL

## 2023-08-25 ENCOUNTER — HOSPITAL ENCOUNTER (OUTPATIENT)
Facility: HOSPITAL | Age: 38
Setting detail: OBSERVATION
Discharge: HOME | End: 2023-08-26
Attending: EMERGENCY MEDICINE | Admitting: HOSPITALIST
Payer: COMMERCIAL

## 2023-08-25 ENCOUNTER — ON CAMPUS - OUTPATIENT (AMBULATORY)
Dept: URBAN - METROPOLITAN AREA HOSPITAL 85 | Facility: HOSPITAL | Age: 38
End: 2023-08-25
Payer: COMMERCIAL

## 2023-08-25 DIAGNOSIS — R94.5 ABNORMAL RESULTS OF LIVER FUNCTION STUDIES: ICD-10-CM

## 2023-08-25 DIAGNOSIS — R10.30 LOWER ABDOMINAL PAIN, UNSPECIFIED: ICD-10-CM

## 2023-08-25 DIAGNOSIS — K57.32 DIVERTICULITIS OF LARGE INTESTINE WITHOUT PERFORATION OR ABS: ICD-10-CM

## 2023-08-25 DIAGNOSIS — R93.3 ABNORMAL FINDINGS ON DIAGNOSTIC IMAGING OF OTHER PARTS OF DI: ICD-10-CM

## 2023-08-25 DIAGNOSIS — K57.92 DIVERTICULITIS: Primary | ICD-10-CM

## 2023-08-25 LAB
ALBUMIN SERPL-MCNC: 4 G/DL (ref 3.5–5.2)
ALBUMIN/GLOB SERPL: 1.4 G/DL
ALP SERPL-CCNC: 78 U/L (ref 39–117)
ALT SERPL W P-5'-P-CCNC: 68 U/L (ref 1–41)
ANION GAP SERPL CALCULATED.3IONS-SCNC: 12 MMOL/L (ref 5–15)
AST SERPL-CCNC: 46 U/L (ref 1–40)
BACTERIA UR QL AUTO: ABNORMAL /HPF
BASOPHILS # BLD AUTO: 0 10*3/MM3 (ref 0–0.2)
BASOPHILS NFR BLD AUTO: 0.6 % (ref 0–1.5)
BILIRUB SERPL-MCNC: 0.2 MG/DL (ref 0–1.2)
BILIRUB UR QL STRIP: NEGATIVE
BUN SERPL-MCNC: 16 MG/DL (ref 6–20)
BUN/CREAT SERPL: 18.2 (ref 7–25)
CALCIUM SPEC-SCNC: 9.4 MG/DL (ref 8.6–10.5)
CHLORIDE SERPL-SCNC: 102 MMOL/L (ref 98–107)
CLARITY UR: CLEAR
CO2 SERPL-SCNC: 23 MMOL/L (ref 22–29)
COLOR UR: ABNORMAL
CREAT SERPL-MCNC: 0.88 MG/DL (ref 0.76–1.27)
D-LACTATE SERPL-SCNC: 1.1 MMOL/L (ref 0.5–2)
DEPRECATED RDW RBC AUTO: 44.6 FL (ref 37–54)
EGFRCR SERPLBLD CKD-EPI 2021: 111.5 ML/MIN/1.73
EOSINOPHIL # BLD AUTO: 0.2 10*3/MM3 (ref 0–0.4)
EOSINOPHIL NFR BLD AUTO: 2.7 % (ref 0.3–6.2)
ERYTHROCYTE [DISTWIDTH] IN BLOOD BY AUTOMATED COUNT: 13.2 % (ref 12.3–15.4)
GLOBULIN UR ELPH-MCNC: 2.9 GM/DL
GLUCOSE SERPL-MCNC: 158 MG/DL (ref 65–99)
GLUCOSE UR STRIP-MCNC: NEGATIVE MG/DL
HCT VFR BLD AUTO: 46.4 % (ref 37.5–51)
HGB BLD-MCNC: 15.7 G/DL (ref 13–17.7)
HGB UR QL STRIP.AUTO: NEGATIVE
HOLD SPECIMEN: NORMAL
HOLD SPECIMEN: NORMAL
HYALINE CASTS UR QL AUTO: ABNORMAL /LPF
KETONES UR QL STRIP: ABNORMAL
LEUKOCYTE ESTERASE UR QL STRIP.AUTO: ABNORMAL
LIPASE SERPL-CCNC: 33 U/L (ref 13–60)
LYMPHOCYTES # BLD AUTO: 2.2 10*3/MM3 (ref 0.7–3.1)
LYMPHOCYTES NFR BLD AUTO: 37.4 % (ref 19.6–45.3)
MCH RBC QN AUTO: 30.9 PG (ref 26.6–33)
MCHC RBC AUTO-ENTMCNC: 33.9 G/DL (ref 31.5–35.7)
MCV RBC AUTO: 91.3 FL (ref 79–97)
MONOCYTES # BLD AUTO: 0.4 10*3/MM3 (ref 0.1–0.9)
MONOCYTES NFR BLD AUTO: 7 % (ref 5–12)
NEUTROPHILS NFR BLD AUTO: 3.1 10*3/MM3 (ref 1.7–7)
NEUTROPHILS NFR BLD AUTO: 52.3 % (ref 42.7–76)
NITRITE UR QL STRIP: NEGATIVE
NRBC BLD AUTO-RTO: 0.2 /100 WBC (ref 0–0.2)
PH UR STRIP.AUTO: <=5 [PH] (ref 5–8)
PLATELET # BLD AUTO: 336 10*3/MM3 (ref 140–450)
PMV BLD AUTO: 7.5 FL (ref 6–12)
POTASSIUM SERPL-SCNC: 3.9 MMOL/L (ref 3.5–5.2)
PROT SERPL-MCNC: 6.9 G/DL (ref 6–8.5)
PROT UR QL STRIP: ABNORMAL
RBC # BLD AUTO: 5.08 10*6/MM3 (ref 4.14–5.8)
RBC # UR STRIP: ABNORMAL /HPF
REF LAB TEST METHOD: ABNORMAL
SODIUM SERPL-SCNC: 137 MMOL/L (ref 136–145)
SP GR UR STRIP: 1.03 (ref 1–1.03)
SQUAMOUS #/AREA URNS HPF: ABNORMAL /HPF
UROBILINOGEN UR QL STRIP: ABNORMAL
WBC # UR STRIP: ABNORMAL /HPF
WBC NRBC COR # BLD: 5.9 10*3/MM3 (ref 3.4–10.8)
WHOLE BLOOD HOLD COAG: NORMAL
WHOLE BLOOD HOLD SPECIMEN: NORMAL

## 2023-08-25 PROCEDURE — 81001 URINALYSIS AUTO W/SCOPE: CPT | Performed by: EMERGENCY MEDICINE

## 2023-08-25 PROCEDURE — 83605 ASSAY OF LACTIC ACID: CPT | Performed by: PHYSICIAN ASSISTANT

## 2023-08-25 PROCEDURE — G0378 HOSPITAL OBSERVATION PER HR: HCPCS

## 2023-08-25 PROCEDURE — 96361 HYDRATE IV INFUSION ADD-ON: CPT

## 2023-08-25 PROCEDURE — 25510000001 IOPAMIDOL PER 1 ML: Performed by: EMERGENCY MEDICINE

## 2023-08-25 PROCEDURE — 85025 COMPLETE CBC W/AUTO DIFF WBC: CPT | Performed by: PHYSICIAN ASSISTANT

## 2023-08-25 PROCEDURE — 87040 BLOOD CULTURE FOR BACTERIA: CPT | Performed by: PHYSICIAN ASSISTANT

## 2023-08-25 PROCEDURE — 99285 EMERGENCY DEPT VISIT HI MDM: CPT

## 2023-08-25 PROCEDURE — 36415 COLL VENOUS BLD VENIPUNCTURE: CPT

## 2023-08-25 PROCEDURE — 25010000002 ONDANSETRON PER 1 MG: Performed by: PHYSICIAN ASSISTANT

## 2023-08-25 PROCEDURE — 25010000002 PIPERACILLIN SOD-TAZOBACTAM PER 1 G: Performed by: PHYSICIAN ASSISTANT

## 2023-08-25 PROCEDURE — 80053 COMPREHEN METABOLIC PANEL: CPT | Performed by: PHYSICIAN ASSISTANT

## 2023-08-25 PROCEDURE — 25010000002 PIPERACILLIN SOD-TAZOBACTAM PER 1 G: Performed by: NURSE PRACTITIONER

## 2023-08-25 PROCEDURE — 74177 CT ABD & PELVIS W/CONTRAST: CPT

## 2023-08-25 PROCEDURE — 83690 ASSAY OF LIPASE: CPT | Performed by: PHYSICIAN ASSISTANT

## 2023-08-25 PROCEDURE — 25010000002 HYDROMORPHONE 1 MG/ML SOLUTION: Performed by: PHYSICIAN ASSISTANT

## 2023-08-25 PROCEDURE — 25010000002 MORPHINE PER 10 MG: Performed by: NURSE PRACTITIONER

## 2023-08-25 PROCEDURE — 99221 1ST HOSP IP/OBS SF/LOW 40: CPT

## 2023-08-25 PROCEDURE — 96365 THER/PROPH/DIAG IV INF INIT: CPT

## 2023-08-25 PROCEDURE — 25010000002 MORPHINE PER 10 MG: Performed by: PHYSICIAN ASSISTANT

## 2023-08-25 PROCEDURE — 96366 THER/PROPH/DIAG IV INF ADDON: CPT

## 2023-08-25 RX ORDER — ACETAMINOPHEN 325 MG/1
650 TABLET ORAL EVERY 6 HOURS PRN
COMMUNITY

## 2023-08-25 RX ORDER — ACETAMINOPHEN 650 MG/1
650 SUPPOSITORY RECTAL EVERY 4 HOURS PRN
Status: DISCONTINUED | OUTPATIENT
Start: 2023-08-25 | End: 2023-08-26 | Stop reason: HOSPADM

## 2023-08-25 RX ORDER — BISACODYL 5 MG/1
5 TABLET, DELAYED RELEASE ORAL DAILY PRN
Status: DISCONTINUED | OUTPATIENT
Start: 2023-08-25 | End: 2023-08-26 | Stop reason: HOSPADM

## 2023-08-25 RX ORDER — CHOLECALCIFEROL (VITAMIN D3) 125 MCG
5 CAPSULE ORAL NIGHTLY PRN
Status: DISCONTINUED | OUTPATIENT
Start: 2023-08-25 | End: 2023-08-26 | Stop reason: HOSPADM

## 2023-08-25 RX ORDER — ALUMINA, MAGNESIA, AND SIMETHICONE 2400; 2400; 240 MG/30ML; MG/30ML; MG/30ML
15 SUSPENSION ORAL EVERY 6 HOURS PRN
Status: DISCONTINUED | OUTPATIENT
Start: 2023-08-25 | End: 2023-08-26 | Stop reason: HOSPADM

## 2023-08-25 RX ORDER — ONDANSETRON 2 MG/ML
4 INJECTION INTRAMUSCULAR; INTRAVENOUS ONCE
Status: COMPLETED | OUTPATIENT
Start: 2023-08-25 | End: 2023-08-25

## 2023-08-25 RX ORDER — ALBUTEROL SULFATE 90 UG/1
2 AEROSOL, METERED RESPIRATORY (INHALATION) EVERY 4 HOURS PRN
COMMUNITY

## 2023-08-25 RX ORDER — LANSOPRAZOLE 30 MG/1
30 TABLET, ORALLY DISINTEGRATING, DELAYED RELEASE ORAL
Status: DISCONTINUED | OUTPATIENT
Start: 2023-08-26 | End: 2023-08-26 | Stop reason: HOSPADM

## 2023-08-25 RX ORDER — ACETAMINOPHEN 160 MG/5ML
650 SOLUTION ORAL EVERY 4 HOURS PRN
Status: DISCONTINUED | OUTPATIENT
Start: 2023-08-25 | End: 2023-08-26 | Stop reason: HOSPADM

## 2023-08-25 RX ORDER — POLYETHYLENE GLYCOL 3350 17 G/17G
17 POWDER, FOR SOLUTION ORAL DAILY PRN
Status: DISCONTINUED | OUTPATIENT
Start: 2023-08-25 | End: 2023-08-26 | Stop reason: HOSPADM

## 2023-08-25 RX ORDER — ONDANSETRON 4 MG/1
4 TABLET, FILM COATED ORAL EVERY 6 HOURS PRN
Status: DISCONTINUED | OUTPATIENT
Start: 2023-08-25 | End: 2023-08-26 | Stop reason: HOSPADM

## 2023-08-25 RX ORDER — AMOXICILLIN 250 MG
2 CAPSULE ORAL 2 TIMES DAILY
Status: DISCONTINUED | OUTPATIENT
Start: 2023-08-25 | End: 2023-08-26 | Stop reason: HOSPADM

## 2023-08-25 RX ORDER — ALBUTEROL SULFATE 90 UG/1
2 AEROSOL, METERED RESPIRATORY (INHALATION) EVERY 4 HOURS PRN
Status: DISCONTINUED | OUTPATIENT
Start: 2023-08-25 | End: 2023-08-26 | Stop reason: HOSPADM

## 2023-08-25 RX ORDER — SODIUM CHLORIDE 0.9 % (FLUSH) 0.9 %
10 SYRINGE (ML) INJECTION AS NEEDED
Status: DISCONTINUED | OUTPATIENT
Start: 2023-08-25 | End: 2023-08-26 | Stop reason: HOSPADM

## 2023-08-25 RX ORDER — SODIUM CHLORIDE 9 MG/ML
125 INJECTION, SOLUTION INTRAVENOUS CONTINUOUS
Status: DISCONTINUED | OUTPATIENT
Start: 2023-08-25 | End: 2023-08-26 | Stop reason: HOSPADM

## 2023-08-25 RX ORDER — PANTOPRAZOLE SODIUM 40 MG/1
40 TABLET, DELAYED RELEASE ORAL ONCE AS NEEDED
Status: DISCONTINUED | OUTPATIENT
Start: 2023-08-25 | End: 2023-08-26 | Stop reason: HOSPADM

## 2023-08-25 RX ORDER — ACETAMINOPHEN 325 MG/1
650 TABLET ORAL EVERY 4 HOURS PRN
Status: DISCONTINUED | OUTPATIENT
Start: 2023-08-25 | End: 2023-08-26 | Stop reason: HOSPADM

## 2023-08-25 RX ORDER — ONDANSETRON 2 MG/ML
4 INJECTION INTRAMUSCULAR; INTRAVENOUS EVERY 6 HOURS PRN
Status: DISCONTINUED | OUTPATIENT
Start: 2023-08-25 | End: 2023-08-26 | Stop reason: HOSPADM

## 2023-08-25 RX ORDER — BISACODYL 10 MG
10 SUPPOSITORY, RECTAL RECTAL DAILY PRN
Status: DISCONTINUED | OUTPATIENT
Start: 2023-08-25 | End: 2023-08-26 | Stop reason: HOSPADM

## 2023-08-25 RX ORDER — ROSUVASTATIN CALCIUM 10 MG/1
10 TABLET, COATED ORAL NIGHTLY
Status: DISCONTINUED | OUTPATIENT
Start: 2023-08-25 | End: 2023-08-26 | Stop reason: HOSPADM

## 2023-08-25 RX ADMIN — MORPHINE SULFATE 4 MG: 4 INJECTION, SOLUTION INTRAMUSCULAR; INTRAVENOUS at 15:45

## 2023-08-25 RX ADMIN — ONDANSETRON 4 MG: 2 INJECTION INTRAMUSCULAR; INTRAVENOUS at 11:38

## 2023-08-25 RX ADMIN — MORPHINE SULFATE 4 MG: 4 INJECTION, SOLUTION INTRAMUSCULAR; INTRAVENOUS at 11:38

## 2023-08-25 RX ADMIN — PIPERACILLIN AND TAZOBACTAM 4.5 G: 4; .5 INJECTION, POWDER, FOR SOLUTION INTRAVENOUS at 20:38

## 2023-08-25 RX ADMIN — PIPERACILLIN AND TAZOBACTAM 4.5 G: 4; .5 INJECTION, POWDER, FOR SOLUTION INTRAVENOUS at 14:28

## 2023-08-25 RX ADMIN — HYDROMORPHONE HYDROCHLORIDE 0.5 MG: 1 INJECTION, SOLUTION INTRAMUSCULAR; INTRAVENOUS; SUBCUTANEOUS at 13:42

## 2023-08-25 RX ADMIN — IOPAMIDOL 100 ML: 755 INJECTION, SOLUTION INTRAVENOUS at 12:42

## 2023-08-25 RX ADMIN — MORPHINE SULFATE 4 MG: 4 INJECTION, SOLUTION INTRAMUSCULAR; INTRAVENOUS at 20:38

## 2023-08-25 RX ADMIN — SODIUM CHLORIDE 1000 ML: 9 INJECTION, SOLUTION INTRAVENOUS at 11:37

## 2023-08-25 RX ADMIN — SODIUM CHLORIDE 125 ML/HR: 9 INJECTION, SOLUTION INTRAVENOUS at 16:37

## 2023-08-25 NOTE — PLAN OF CARE
Goal Outcome Evaluation:  Plan of Care Reviewed With: patient        Progress: no change  Outcome Evaluation: Pt being admitted for acute diverticulitis that has been unresolved with oral antibiotics. C/O pain 7/10 in lower abdomen.

## 2023-08-25 NOTE — H&P
[facility] Medicine Services  History & Physical    Patient Name: Caleb Vargas  : 1985  MRN: [id number]  Primary Care Physician:  Pearl Russell MD  Date of admission: 2023    Subjective      Chief Complaint: abdominal pain    History of Present Illness: Caleb Vargas is a 38 y.o. male who presented to [facility] on 2023 complaining of worsening suprapubic abdominal pain and nausea that started approximately 7 days ago.  He was seen in an ER in [place] on the  and was diagnosed with diverticulitis at that time.  He has been taking Flagyl and Cipro with no improvement of his symptoms.  He reports no vomiting, fever, chills, diarrhea.      ROS   Constitutional: Negative.    Respiratory: Negative.     Cardiovascular: Negative.    Gastrointestinal:  Positive for abdominal pain and nausea. Negative for abdominal distention, constipation, diarrhea and vomiting.   Genitourinary:  Negative for decreased urine volume, difficulty urinating, dysuria, flank pain, frequency, hematuria and urgency.   Musculoskeletal:  Negative for back pain, neck pain and neck stiffness.   Skin: Negative.    Neurological: Negative.    Personal History     Past Medical History:   Diagnosis Date    Abdominal pain     Agitation     Allergies     Apnea     Change in weight     Chest congestion     Choking     Cough     GERD (gastroesophageal reflux disease)     High blood pressure     Obesity     Sleep disturbance     Snoring     Wheezing        Past Surgical History:   Procedure Laterality Date    CLEFT PALATE REPAIR      FOOT ARTHROPLASTY Left 2020    SINUS SURGERY      TONSILLECTOMY         Family History: family history includes No Known Problems in his brother, father, and mother. Otherwise pertinent FHx was reviewed and not pertinent to current issue.    Social History:  reports that he has been smoking cigarettes. He has been smoking an average of .5 packs per day. He has never used  smokeless tobacco. He reports current alcohol use. He reports that he does not use drugs.    Home Medications:  Prior to Admission Medications       Prescriptions Last Dose Informant Patient Reported? Taking?    acetaminophen (TYLENOL) 325 MG tablet   Yes Yes    Take 2 tablets by mouth Every 6 (Six) Hours As Needed for Mild Pain.    albuterol sulfate  (90 Base) MCG/ACT inhaler   Yes Yes    Inhale 2 puffs Every 4 (Four) Hours As Needed for Wheezing.    Anoro Ellipta 62.5-25 MCG/ACT aerosol powder  inhaler   Yes Yes    Inhale 1 puff Daily.    clobetasol (TEMOVATE) 0.05 % external solution   Yes Yes    Apply 1 application  topically to the appropriate area as directed As Needed.    omeprazole (priLOSEC) 40 MG capsule   Yes Yes    Take 1 capsule by mouth Daily As Needed.    ondansetron (ZOFRAN) 4 MG tablet   Yes Yes    Take 1 tablet by mouth Every 6 (Six) Hours As Needed for Nausea or Vomiting.    rosuvastatin (CRESTOR) 20 MG tablet   Yes Yes    Take 0.5 tablets by mouth Every Night.              Allergies:  Allergies   Allergen Reactions    Atorvastatin Unknown - Low Severity    Latex Hives    Latex Rash    Tamiflu [Oseltamivir Phosphate] Nausea And Vomiting       Objective      Vitals:   Temp:  [97.3 °F (36.3 °C)-98.3 °F (36.8 °C)] 97.3 °F (36.3 °C)  Heart Rate:  [] 92  Resp:  [18-20] 20  BP: (138-151)/(60-97) 148/60    Physical Exam   Vitals and nursing note reviewed.   Constitutional:       General: He is not in acute distress.     Appearance: He is well-developed. He is obese. He is not ill-appearing, toxic-appearing or diaphoretic.   HENT:      Head: Normocephalic and atraumatic.      Mouth/Throat:      Mouth: Mucous membranes are moist.      Pharynx: Oropharynx is clear.   Eyes:      General: No scleral icterus.     Extraocular Movements: Extraocular movements intact.      Pupils: Pupils are equal, round, and reactive to light.   Cardiovascular:      Rate and Rhythm: Normal rate and regular rhythm.       Heart sounds: No murmur heard.    No friction rub. No gallop.   Pulmonary:      Effort: Pulmonary effort is normal. No tachypnea or accessory muscle usage.      Breath sounds: No decreased breath sounds, wheezing, rhonchi or rales.   Chest:      Chest wall: No mass, deformity, tenderness or crepitus.   Abdominal:      General: Bowel sounds are decreased. There is no distension.      Palpations: Abdomen is soft. There is no mass.      Tenderness: There is abdominal tenderness in the suprapubic area. There is no right CVA tenderness, left CVA tenderness, guarding or rebound.   Musculoskeletal:      Cervical back: Normal range of motion. No rigidity.   Skin:     General: Skin is warm.      Capillary Refill: Capillary refill takes less than 2 seconds.      Findings: No rash.   Neurological:      General: No focal deficit present.      Mental Status: He is alert and oriented to person, place, and time.   Psychiatric:         Mood and Affect: Mood normal.         Behavior: Behavior normal.    Result Review    Result Review:  I have personally reviewed the results from the time of this admission to 8/25/2023 16:00 EDT and agree with these findings:  [x]  Laboratory  []  Microbiology  [x]  Radiology  []  EKG/Telemetry   []  Cardiology/Vascular   []  Pathology  []  Old records  []  Other:      Assessment & Plan        Active Hospital Problems:  Active Hospital Problems    Diagnosis     **Diverticulitis      Plan: 38-year-old male with acute diverticulitis    Continue current antibiotics  Antiemetics  Strict n.p.o.  IVF  GI consulted  We will resume appropriate home medications      DVT prophylaxis:  Mechanical DVT prophylaxis orders are present.    CODE STATUS:    Code Status (Patient has no pulse and is not breathing): CPR (Attempt to Resuscitate)  Medical Interventions (Patient has pulse or is breathing): Full Support    Admission Status:  I believe this patient meets inpatient observation status.    I discussed the  patient's findings and my recommendations with patient.      Signature: Electronically signed by ELIEL Mims, 08/25/23, 16:00 EDT.  [facility] Hospitalist Team

## 2023-08-25 NOTE — Clinical Note
Level of Care: Med/Surg [1]   Diagnosis: Diverticulitis [542336]   Admitting Physician: SONIA MEDELLIN [[id number]]   Attending Physician: SONIA MEDELLIN [[id number]]

## 2023-08-25 NOTE — LETTER
August 26, 2023     Patient: Caleb Vargas   YOB: 1985   Date of Visit: 8/25/2023       To Whom It May Concern:    It is my medical opinion that Caleb Vargas can return to work without restrictions on Monday 28th.           Sincerely,        KAYE Calloway RN    CC:   No Recipients

## 2023-08-25 NOTE — ED PROVIDER NOTES
Subjective   History of Present Illness  Patient is a 38-year-old male presents to the ED with complaints of worsening suprapubic abdominal pain since he was seen in an ED in [facility] on the 18th.  He was diagnosed with diverticulitis at that time has been taking Flagyl and ciprofloxacin with no improvement of his symptoms.  He does report some associated nausea no vomiting, fever, diarrhea, or constipation.  He has denies any urinary complaint.       Review of Systems   Constitutional: Negative.    Respiratory: Negative.     Cardiovascular: Negative.    Gastrointestinal:  Positive for abdominal pain and nausea. Negative for abdominal distention, constipation, diarrhea and vomiting.   Genitourinary:  Negative for decreased urine volume, difficulty urinating, dysuria, flank pain, frequency, hematuria and urgency.   Musculoskeletal:  Negative for back pain, neck pain and neck stiffness.   Skin: Negative.    Neurological: Negative.      Past Medical History:   Diagnosis Date    Abdominal pain     Agitation     Allergies     Apnea     Change in weight     Chest congestion     Choking     Cough     GERD (gastroesophageal reflux disease)     High blood pressure     Obesity     Sleep disturbance     Snoring     Wheezing        Allergies   Allergen Reactions    Atorvastatin Unknown - Low Severity    Latex Hives    Latex Rash    Tamiflu [Oseltamivir Phosphate] Nausea And Vomiting       Past Surgical History:   Procedure Laterality Date    CLEFT PALATE REPAIR      FOOT ARTHROPLASTY Left 2020    SINUS SURGERY      TONSILLECTOMY         Family History   Problem Relation Age of Onset    No Known Problems Mother     No Known Problems Father     No Known Problems Brother        Social History     Socioeconomic History    Marital status: Single   Tobacco Use    Smoking status: Every Day     Packs/day: 0.50     Types: Cigarettes    Smokeless tobacco: Never   Vaping Use    Vaping Use: Never used   Substance and Sexual Activity  "   Alcohol use: Yes     Comment: 5 beers every other week    Drug use: Never    Sexual activity: Defer           Objective   Physical Exam  Vitals and nursing note reviewed.   Constitutional:       General: He is not in acute distress.     Appearance: He is well-developed. He is obese. He is not ill-appearing, toxic-appearing or diaphoretic.   HENT:      Head: Normocephalic and atraumatic.      Mouth/Throat:      Mouth: Mucous membranes are moist.      Pharynx: Oropharynx is clear.   Eyes:      General: No scleral icterus.     Extraocular Movements: Extraocular movements intact.      Pupils: Pupils are equal, round, and reactive to light.   Cardiovascular:      Rate and Rhythm: Normal rate and regular rhythm.      Heart sounds: No murmur heard.    No friction rub. No gallop.   Pulmonary:      Effort: Pulmonary effort is normal. No tachypnea or accessory muscle usage.      Breath sounds: No decreased breath sounds, wheezing, rhonchi or rales.   Chest:      Chest wall: No mass, deformity, tenderness or crepitus.   Abdominal:      General: Bowel sounds are decreased. There is no distension.      Palpations: Abdomen is soft. There is no mass.      Tenderness: There is abdominal tenderness in the suprapubic area. There is no right CVA tenderness, left CVA tenderness, guarding or rebound.   Musculoskeletal:      Cervical back: Normal range of motion. No rigidity.   Skin:     General: Skin is warm.      Capillary Refill: Capillary refill takes less than 2 seconds.      Findings: No rash.   Neurological:      General: No focal deficit present.      Mental Status: He is alert and oriented to person, place, and time.   Psychiatric:         Mood and Affect: Mood normal.         Behavior: Behavior normal.       Procedures           ED Course      /85 (BP Location: Left arm, Patient Position: Sitting)   Pulse 99   Temp 97.6 °F (36.4 °C) (Oral)   Resp 18   Ht 180.3 cm (71\")   Wt 128 kg (281 lb 4.9 oz)   SpO2 92%   " BMI 39.23 kg/m²   Medications   sodium chloride 0.9 % flush 10 mL (has no administration in time range)   piperacillin-tazobactam (ZOSYN) IVPB 4.5 g in 100 mL NS (CD) (has no administration in time range)   sodium chloride 0.9 % bolus 1,000 mL (1,000 mL Intravenous New Bag 8/25/23 1137)   ondansetron (ZOFRAN) injection 4 mg (4 mg Intravenous Given 8/25/23 1138)   morphine injection 4 mg (4 mg Intravenous Given 8/25/23 1138)   iopamidol (ISOVUE-370) 76 % injection 100 mL (100 mL Intravenous Given 8/25/23 1242)   HYDROmorphone (DILAUDID) injection 0.5 mg (0.5 mg Intravenous Given 8/25/23 1342)     Labs Reviewed   URINALYSIS W/ CULTURE IF INDICATED - Abnormal; Notable for the following components:       Result Value    Color, UA Dark Yellow (*)     Ketones, UA Trace (*)     Protein, UA Trace (*)     Leuk Esterase, UA Trace (*)     All other components within normal limits    Narrative:     In absence of clinical symptoms, the presence of pyuria, bacteria, and/or nitrites on the urinalysis result does not correlate with infection.   COMPREHENSIVE METABOLIC PANEL - Abnormal; Notable for the following components:    Glucose 158 (*)     ALT (SGPT) 68 (*)     AST (SGOT) 46 (*)     All other components within normal limits    Narrative:     GFR Normal >60  Chronic Kidney Disease <60  Kidney Failure <15     URINALYSIS, MICROSCOPIC ONLY - Abnormal; Notable for the following components:    RBC, UA 0-2 (*)     WBC, UA 0-2 (*)     All other components within normal limits   LIPASE - Normal   CBC WITH AUTO DIFFERENTIAL - Normal   BLOOD CULTURE   BLOOD CULTURE   RAINBOW DRAW    Narrative:     The following orders were created for panel order [place] Draw.  Procedure                               Abnormality         Status                     ---------                               -----------         ------                     Green Top (Gel)[[id number]]                                  Final result               Lavender  Top[[id number]]                                     Final result               Gold Top - SST[[id number]]                                   Final result               Light Blue Top[[id number]]                                   Final result                 Please view results for these tests on the individual orders.   LACTIC ACID, PLASMA   GREEN TOP   LAVENDER TOP   GOLD TOP - SST   LIGHT BLUE TOP   CBC AND DIFFERENTIAL    Narrative:     The following orders were created for panel order CBC & Differential.  Procedure                               Abnormality         Status                     ---------                               -----------         ------                     CBC Auto Differential[[id number]]        Normal              Final result                 Please view results for these tests on the individual orders.     CT Abdomen Pelvis With Contrast   Final Result   Impression:   Findings are compatible with acute uncomplicated sigmoid diverticulitis.                         Electronically Signed: Nichelle De Leon MD     8/25/2023 12:53 PM EDT     Workstation ID: [id number]                                             Medical Decision Making  Chart Review: Paperwork brought by patient from [facility] in [place], [place]   CBC showed no leukocytosis or anemia.  Metabolic panel showed no signs of severe electrolyte abnormality or dehydration.  Urinalysis unremarkable for UTI I see that CT abdomen pelvis with contrast was ordered unfortunately results are not available but patient reports he was found to have diverticulitis he was sent home on Cipro, Flagyl, and Zofran    Comorbidity: As above  Differentials: Intra-abdominal abscess, bowel perforation, worsening diverticulitis     ;this list is not all inclusive and does not constitute the entirety of considered causes  Labs: As above  Radiology: My interpretation CT abdomen pelvis shows no obvious obstruction correlated with radiologist interpretation  as below  CT Abdomen Pelvis With Contrast   Final Result    Impression:    Findings are compatible with acute uncomplicated sigmoid diverticulitis.        Electronically Signed: Nichelle De Leon MD      8/25/2023 12:53 PM EDT      Workstation ID: [id number]     Disposition/Treatment:  Appropriate PPE was worn during exam and throughout all encounters with the patient.  While in the ED IV was placed and labs were obtained patient was placed on proper monitors he was afebrile and appeared nontoxic presented with complaints of continued abdominal pain recently diagnosed with diverticulitis has been taking Cipro and Flagyl for 8 days with no improvement of his symptoms.    Lab results today showed No leukocytosis or anemia.  Metabolic panel showed glucose 158 ALT 68 AST 46 otherwise unremarkable.  Lipase 33.  Urinalysis unremarkable for UTI.  Blood cultures are pending.  Due to patient's continued and worsening abdominal pain CT abdomen and pelvis was repeated while in the ED today which shows continued acute diverticulitis without any abscess or perforation.  Due to continued symptoms with no improvement I feel patient has failed outpatient treatment will be brought in for IV antibiotics.  He was given a dose of Zosyn while in the ED.  GI will also be consulted.  Findings were discussed with the patient at bedside voiced her same admission and was in agreement with plan.  All questions were answered.  Spoke to RACHELLE Fatima with hospitalist group who agreed for admission    Problems Addressed:  Diverticulitis: complicated acute illness or injury    Amount and/or Complexity of Data Reviewed  External Data Reviewed: labs and notes.  Labs: ordered. Decision-making details documented in ED Course.  Radiology: ordered. Decision-making details documented in ED Course.  Discussion of management or test interpretation with external provider(s): As above     Risk  Prescription drug management.  Decision regarding  hospitalization.        Final diagnoses:   Diverticulitis       ED Disposition  ED Disposition       ED Disposition   Decision to Admit    Condition   --    Comment   Level of Care: Med/Surg [1]   Admitting Physician: SONIA MEDELLIN [[id number]]   Attending Physician: SONIA MEDELLIN [[id number]]                 No follow-up provider specified.       Medication List      No changes were made to your prescriptions during this visit.            Mariah Guaman PA  08/25/23 1698

## 2023-08-25 NOTE — CONSULTS
GI CONSULT  NOTE:    Referring Provider:  Dr. Ennis     Chief complaint: abd pain    Subjective .     History of present illness: Caleb Vargas is a 38 y.o. male with history of diverticulitis with microperforation and pericolonic abscess presents to the hospital with complaints of abdominal pain.  He went on a road trip recently and ate a large amount of rice crispy treats.  He began experiencing suprapubic/left lower quadrant abdominal pain on 8/16.  This was in [place].  He went to the ER and was given antibiotics and discharged home.  He completed a course of ciprofloxacin and Flagyl with no improvement.  He does note that prior to the pain beginning he was slightly constipated.  Prior to pain medications, his pain is a 7/10 on the pain scale.  He received morphine about 30 minutes ago and currently his pain is a 2/10.  He describes it as a dull/nagging pain.  He did have chills last night, but no fever.  His last bowel movement was this morning and he denies bright red per rectum or melena.He is feeling     Endo History:  7/2022 colonoscopy ([facility])-mild diverticulosis in the descending and sigmoid colon, grade 2 internal hemorrhoids, polyps (SSA, TA)-recall 2025    Past Medical History:  Past Medical History:   Diagnosis Date    Abdominal pain     Agitation     Allergies     Apnea     Change in weight     Chest congestion     Choking     Cough     GERD (gastroesophageal reflux disease)     High blood pressure     Obesity     Sleep disturbance     Snoring     Wheezing        Past Surgical History:  Past Surgical History:   Procedure Laterality Date    CLEFT PALATE REPAIR      FOOT ARTHROPLASTY Left 2020    SINUS SURGERY      TONSILLECTOMY         Social History:  Social History     Tobacco Use    Smoking status: Every Day     Packs/day: 0.50     Types: Cigarettes    Smokeless tobacco: Never   Vaping Use    Vaping Use: Never used   Substance Use Topics    Alcohol use: Yes     Comment: 5 beers every  other week    Drug use: Never       Family History:  Family History   Problem Relation Age of Onset    No Known Problems Mother     No Known Problems Father     No Known Problems Brother        Medications:  (Not in a hospital admission)      Scheduled Meds:[START ON 8/26/2023] lansoprazole, 30 mg, Oral, Q AM  piperacillin-tazobactam, 4.5 g, Intravenous, Q8H  rosuvastatin, 10 mg, Oral, Nightly  senna-docusate sodium, 2 tablet, Oral, BID      Continuous Infusions:sodium chloride, 125 mL/hr, Last Rate: 125 mL/hr (08/25/23 1637)      PRN Meds:.  acetaminophen **OR** acetaminophen **OR** acetaminophen    albuterol sulfate HFA    aluminum-magnesium hydroxide-simethicone    senna-docusate sodium **AND** polyethylene glycol **AND** bisacodyl **AND** bisacodyl    Calcium Replacement - Follow Nurse / BPA Driven Protocol    Magnesium Standard Dose Replacement - Follow Nurse / BPA Driven Protocol    melatonin    Morphine    ondansetron **OR** ondansetron    Phosphorus Replacement - Follow Nurse / BPA Driven Protocol    Potassium Replacement - Follow Nurse / BPA Driven Protocol    sodium chloride    ALLERGIES:  Atorvastatin, Latex, Latex, and Tamiflu [oseltamivir phosphate]    ROS:  The following systems were reviewed and negative;   Constitution:  No fevers, chills, no unintentional weight loss  Skin: no rash, no jaundice  Eyes:  No blurry vision, no eye pain  HENT:  No change in hearing or smell  Resp:  No dyspnea or cough  CV:  No chest pain or palpitations  :  No dysuria, hematuria  Musculoskeletal:  No leg cramps or arthralgias  Neuro:  No tremor, no numbness  Psych:  No depression or confusion    Objective     Vital Signs:   Vitals:    08/25/23 1106 08/25/23 1127 08/25/23 1406 08/25/23 1410   BP: 139/90 138/85 151/97 148/60   BP Location: Left arm Left arm Left arm    Patient Position: Sitting Sitting Sitting    Pulse: 102 99 90 92   Resp: 18   20   Temp: 98.3 °F (36.8 °C) 97.6 °F (36.4 °C) 97.3 °F (36.3 °C)    TempSrc:  "Oral Oral Oral    SpO2: 95% 92% 98% 98%   Weight: 128 kg (281 lb 4.9 oz)      Height: 180.3 cm (71\")          Physical Exam:       General Appearance:    Awake and alert, in no acute distress   Head:    Normocephalic, without obvious abnormality, atraumatic   Throat:   No oral lesions, no thrush, oral mucosa moist   Lungs:     Respirations regular, even and unlabored   Chest Wall:    No abnormalities observed   Abdomen:     Soft, mild LLQ/suprapubic abd tenderness, no rebound or guarding, non-distended, no hepatosplenomegaly   Rectal:     Deferred   Extremities:   Moves all extremities, no edema, no cyanosis   Pulses:   Pulses palpable and equal bilaterally   Skin:   No rash, no jaundice, normal palpation       Neurologic:   Cranial nerves 2 - 12 grossly intact, no asterixis       Results Review:   I reviewed the patient's labs and imaging.  CBC    Results from last 7 days   Lab Units 08/25/23  1133   WBC 10*3/mm3 5.90   HEMOGLOBIN g/dL 15.7   PLATELETS 10*3/mm3 336     CMP   Results from last 7 days   Lab Units 08/25/23  1133   SODIUM mmol/L 137   POTASSIUM mmol/L 3.9   CHLORIDE mmol/L 102   CO2 mmol/L 23.0   BUN mg/dL 16   CREATININE mg/dL 0.88   GLUCOSE mg/dL 158*   ALBUMIN g/dL 4.0   BILIRUBIN mg/dL 0.2   ALK PHOS U/L 78   AST (SGOT) U/L 46*   ALT (SGPT) U/L 68*   LIPASE U/L 33     Cr Clearance Estimated Creatinine Clearance: 152.1 mL/min (by C-G formula based on SCr of 0.88 mg/dL).  Coag     HbA1C   Lab Results   Component Value Date    HGBA1C 6.0 (H) 09/15/2022    HGBA1C 5.8 (H) 06/01/2022    HGBA1C 5.2 07/20/2020     Blood Glucose No results found for: POCGLU  Infection     UA    Results from last 7 days   Lab Units 08/25/23  1127   NITRITE UA  Negative   WBC UA /HPF 0-2*   BACTERIA UA /HPF None Seen   SQUAM EPITHEL UA /HPF 0-2     Radiology(recent) CT Abdomen Pelvis With Contrast    Result Date: 8/25/2023  Impression: Findings are compatible with acute uncomplicated sigmoid diverticulitis.  Electronically " Signed: Nichelle De Leon MD  8/25/2023 12:53 PM EDT  Workstation ID: [id number]        ASSESSMENT AND PLAN:    38-year-old male with history of diverticulitis with microperforation and pericolonic abscess presented to the hospital with complaints of abdominal pain.  Recently treated for diverticulitis about 1 week ago with oral cipro/flagyl with no improvement.     -Acute sigmoid diverticulitis  -Suprapubic/left lower quadrant abdominal pain  -Elevated LFTs  -History of diverticulitis with microperforation and pericolonic abscess    Plan  CT with uncomplicated sigmoid diverticulitis.  White blood cell count is normal and he is without fever/chills.  He is mildly tender on exam.  He has been started on IV Zosyn which can be continued.  Analgesics and antiemetics as needed.  He can advance to low residue diet as tolerated.  He is up-to-date on colonoscopy with most recent being last year.  He did have mildly elevated LFTs which can be followed up on as an outpt.   Continue supportive care.    I discussed the patients findings and my recommendations with the patient.    We appreciate the referral    Electronically signed by ELIEL Molina, 08/25/23, 4:38 PM EDT.

## 2023-08-26 VITALS
WEIGHT: 281.31 LBS | BODY MASS INDEX: 39.38 KG/M2 | HEART RATE: 91 BPM | HEIGHT: 71 IN | SYSTOLIC BLOOD PRESSURE: 141 MMHG | DIASTOLIC BLOOD PRESSURE: 96 MMHG | RESPIRATION RATE: 21 BRPM | TEMPERATURE: 97.5 F | OXYGEN SATURATION: 98 %

## 2023-08-26 LAB
ANION GAP SERPL CALCULATED.3IONS-SCNC: 9 MMOL/L (ref 5–15)
BASOPHILS # BLD AUTO: 0 10*3/MM3 (ref 0–0.2)
BASOPHILS NFR BLD AUTO: 0.5 % (ref 0–1.5)
BUN SERPL-MCNC: 15 MG/DL (ref 6–20)
BUN/CREAT SERPL: 14 (ref 7–25)
CALCIUM SPEC-SCNC: 9.1 MG/DL (ref 8.6–10.5)
CHLORIDE SERPL-SCNC: 104 MMOL/L (ref 98–107)
CO2 SERPL-SCNC: 28 MMOL/L (ref 22–29)
CREAT SERPL-MCNC: 1.07 MG/DL (ref 0.76–1.27)
DEPRECATED RDW RBC AUTO: 44.2 FL (ref 37–54)
EGFRCR SERPLBLD CKD-EPI 2021: 90 ML/MIN/1.73
EOSINOPHIL # BLD AUTO: 0.3 10*3/MM3 (ref 0–0.4)
EOSINOPHIL NFR BLD AUTO: 3.7 % (ref 0.3–6.2)
ERYTHROCYTE [DISTWIDTH] IN BLOOD BY AUTOMATED COUNT: 13 % (ref 12.3–15.4)
GLUCOSE SERPL-MCNC: 81 MG/DL (ref 65–99)
HCT VFR BLD AUTO: 43.2 % (ref 37.5–51)
HGB BLD-MCNC: 14.5 G/DL (ref 13–17.7)
LYMPHOCYTES # BLD AUTO: 3.4 10*3/MM3 (ref 0.7–3.1)
LYMPHOCYTES NFR BLD AUTO: 42.1 % (ref 19.6–45.3)
MCH RBC QN AUTO: 31.1 PG (ref 26.6–33)
MCHC RBC AUTO-ENTMCNC: 33.6 G/DL (ref 31.5–35.7)
MCV RBC AUTO: 92.5 FL (ref 79–97)
MONOCYTES # BLD AUTO: 0.9 10*3/MM3 (ref 0.1–0.9)
MONOCYTES NFR BLD AUTO: 11.3 % (ref 5–12)
NEUTROPHILS NFR BLD AUTO: 3.4 10*3/MM3 (ref 1.7–7)
NEUTROPHILS NFR BLD AUTO: 42.4 % (ref 42.7–76)
NRBC BLD AUTO-RTO: 0.1 /100 WBC (ref 0–0.2)
PLATELET # BLD AUTO: 301 10*3/MM3 (ref 140–450)
PMV BLD AUTO: 7.2 FL (ref 6–12)
POTASSIUM SERPL-SCNC: 4.7 MMOL/L (ref 3.5–5.2)
RBC # BLD AUTO: 4.67 10*6/MM3 (ref 4.14–5.8)
SODIUM SERPL-SCNC: 141 MMOL/L (ref 136–145)
WBC NRBC COR # BLD: 8 10*3/MM3 (ref 3.4–10.8)

## 2023-08-26 PROCEDURE — 36415 COLL VENOUS BLD VENIPUNCTURE: CPT | Performed by: NURSE PRACTITIONER

## 2023-08-26 PROCEDURE — 25010000002 MORPHINE PER 10 MG: Performed by: NURSE PRACTITIONER

## 2023-08-26 PROCEDURE — 85025 COMPLETE CBC W/AUTO DIFF WBC: CPT | Performed by: NURSE PRACTITIONER

## 2023-08-26 PROCEDURE — 96366 THER/PROPH/DIAG IV INF ADDON: CPT

## 2023-08-26 PROCEDURE — 80048 BASIC METABOLIC PNL TOTAL CA: CPT | Performed by: NURSE PRACTITIONER

## 2023-08-26 PROCEDURE — 96361 HYDRATE IV INFUSION ADD-ON: CPT

## 2023-08-26 PROCEDURE — G0378 HOSPITAL OBSERVATION PER HR: HCPCS

## 2023-08-26 PROCEDURE — 25010000002 PIPERACILLIN SOD-TAZOBACTAM PER 1 G: Performed by: NURSE PRACTITIONER

## 2023-08-26 RX ORDER — DICYCLOMINE HYDROCHLORIDE 10 MG/1
20 CAPSULE ORAL 4 TIMES DAILY
Status: DISCONTINUED | OUTPATIENT
Start: 2023-08-26 | End: 2023-08-26 | Stop reason: HOSPADM

## 2023-08-26 RX ORDER — DICYCLOMINE HYDROCHLORIDE 10 MG/1
20 CAPSULE ORAL EVERY 6 HOURS PRN
Qty: 28 CAPSULE | Refills: 0 | Status: SHIPPED | OUTPATIENT
Start: 2023-08-26 | End: 2023-09-02

## 2023-08-26 RX ORDER — AMOXICILLIN AND CLAVULANATE POTASSIUM 875; 125 MG/1; MG/1
1 TABLET, FILM COATED ORAL EVERY 8 HOURS
Qty: 42 TABLET | Refills: 0 | Status: SHIPPED | OUTPATIENT
Start: 2023-08-27 | End: 2023-09-10

## 2023-08-26 RX ADMIN — DICYCLOMINE HYDROCHLORIDE 20 MG: 10 CAPSULE ORAL at 09:09

## 2023-08-26 RX ADMIN — PIPERACILLIN AND TAZOBACTAM 4.5 G: 4; .5 INJECTION, POWDER, FOR SOLUTION INTRAVENOUS at 03:21

## 2023-08-26 RX ADMIN — MORPHINE SULFATE 4 MG: 4 INJECTION, SOLUTION INTRAMUSCULAR; INTRAVENOUS at 09:09

## 2023-08-26 RX ADMIN — MORPHINE SULFATE 4 MG: 4 INJECTION, SOLUTION INTRAMUSCULAR; INTRAVENOUS at 04:34

## 2023-08-26 RX ADMIN — PIPERACILLIN AND TAZOBACTAM 4.5 G: 4; .5 INJECTION, POWDER, FOR SOLUTION INTRAVENOUS at 12:17

## 2023-08-26 RX ADMIN — MORPHINE SULFATE 4 MG: 4 INJECTION, SOLUTION INTRAMUSCULAR; INTRAVENOUS at 00:31

## 2023-08-26 NOTE — PLAN OF CARE
Problem: Adult Inpatient Plan of Care  Goal: Plan of Care Review  8/26/2023 1342 by Andre Calloway RN  Outcome: Met  8/26/2023 1140 by Andre Calloway RN  Outcome: Ongoing, Progressing  Flowsheets (Taken 8/26/2023 1140)  Progress: improving  Plan of Care Reviewed With: patient  Outcome Evaluation: possible discharge later this afternoon  Goal: Patient-Specific Goal (Individualized)  8/26/2023 1342 by Andre Calloway RN  Outcome: Met  8/26/2023 1140 by Andre Calloway RN  Outcome: Ongoing, Progressing  Goal: Absence of Hospital-Acquired Illness or Injury  8/26/2023 1342 by Andre Calloway RN  Outcome: Met  8/26/2023 1140 by Andre Calloway RN  Outcome: Ongoing, Progressing  Intervention: Identify and Manage Fall Risk  Recent Flowsheet Documentation  Taken 8/26/2023 1227 by Andre Calloway RN  Safety Promotion/Fall Prevention: safety round/check completed  Taken 8/26/2023 1000 by Andre Calloway RN  Safety Promotion/Fall Prevention: safety round/check completed  Taken 8/26/2023 0810 by Andre Calloway RN  Safety Promotion/Fall Prevention: safety round/check completed  Intervention: Prevent Skin Injury  Recent Flowsheet Documentation  Taken 8/26/2023 0810 by Andre Calloway RN  Skin Protection: adhesive use limited  Goal: Optimal Comfort and Wellbeing  8/26/2023 1342 by Andre Calloway RN  Outcome: Met  8/26/2023 1140 by Andre Calloway RN  Outcome: Ongoing, Progressing  Intervention: Provide Person-Centered Care  Recent Flowsheet Documentation  Taken 8/26/2023 0810 by Andre Calloway RN  Trust Relationship/Rapport: care explained  Goal: Readiness for Transition of Care  8/26/2023 1342 by Andre Calloway RN  Outcome: Met  8/26/2023 1140 by Andre Calloway RN  Outcome: Ongoing, Progressing   Goal Outcome Evaluation:  Plan of Care Reviewed With: patient        Progress: improving  Outcome Evaluation: possible discharge later this afternoon

## 2023-08-26 NOTE — DISCHARGE SUMMARY
[facility] Medicine Services  Discharge Summary    Date of Service: 2023  Patient Name: Caleb Vargas  : 1985  MRN: [id number]    Date of Admission: 2023  Discharge Diagnosis: Diverticulitis, uncomplicated  Date of Discharge: 2023  Primary Care Physician: Pearl Russell MD      Presenting Problem:   Diverticulitis [K57.92]    Active and Resolved Hospital Problems:  Active Hospital Problems    Diagnosis POA    **Diverticulitis [K57.92] Yes      Resolved Hospital Problems   No resolved problems to display.         Hospital Course     Hospital Course:  Caleb Vargas is a 38 y.o. male with worsening abdominal pain after being diagnosed with diverticulitis about a week prior.  He was given Cipro and Flagyl to take at home however his symptoms did not improve.  He was started on IV antibiotics and pain medications here with improvement of symptoms.  He was seen here by GI who agreed with diagnosis of uncomplicated acute diverticulitis.  GI recommended 2 weeks of po Augmentin also Bentyl for pain control.  He will follow-up with GI as an outpatient.        DISCHARGE Follow Up Recommendations for labs and diagnostics: Follow-up with GI in 1 to 2 weeks      Reasons For Change In Medications and Indications for New Medications:      Day of Discharge     Vital Signs:  Temp:  [97.3 °F (36.3 °C)-98.1 °F (36.7 °C)] 97.5 °F (36.4 °C)  Heart Rate:  [75-92] 91  Resp:  [16-21] 21  BP: (124-151)/(60-97) 141/96    Physical Exam:  Physical Exam   Vitals and nursing note reviewed.   Constitutional:       General: He is not in acute distress.     Appearance: He is well-developed. He is obese. He is not ill-appearing, toxic-appearing or diaphoretic.   HENT:      Head: Normocephalic and atraumatic.      Mouth/Throat:      Mouth: Mucous membranes are moist.      Pharynx: Oropharynx is clear.   Eyes:      General: No scleral icterus.     Extraocular Movements: Extraocular movements intact.       Pupils: Pupils are equal, round, and reactive to light.   Cardiovascular:      Rate and Rhythm: Normal rate and regular rhythm.      Heart sounds: No murmur heard.    No friction rub. No gallop.   Pulmonary:      Effort: Pulmonary effort is normal. No tachypnea or accessory muscle usage.      Breath sounds: No decreased breath sounds, wheezing, rhonchi or rales.   Chest:      Chest wall: No mass, deformity, tenderness or crepitus.   Abdominal:      General: Bowel sounds are decreased. There is no distension.      Palpations: Abdomen is soft. There is no mass.      Tenderness: There is abdominal tenderness (improved) in the suprapubic area. There is no right CVA tenderness, left CVA tenderness, guarding or rebound.   Musculoskeletal:      Cervical back: Normal range of motion. No rigidity.   Skin:     General: Skin is warm.      Capillary Refill: Capillary refill takes less than 2 seconds.      Findings: No rash.   Neurological:      General: No focal deficit present.      Mental Status: He is alert and oriented to person, place, and time.   Psychiatric:         Mood and Affect: Mood normal.         Behavior: Behavior normal.      Pertinent  and/or Most Recent Results     LAB RESULTS:      Lab 08/26/23  0141 08/25/23  1415 08/25/23  1133   WBC 8.00  --  5.90   HEMOGLOBIN 14.5  --  15.7   HEMATOCRIT 43.2  --  46.4   PLATELETS 301  --  336   NEUTROS ABS 3.40  --  3.10   LYMPHS ABS 3.40*  --  2.20   MONOS ABS 0.90  --  0.40   EOS ABS 0.30  --  0.20   MCV 92.5  --  91.3   LACTATE  --  1.1  --          Lab 08/26/23  0141 08/25/23  1133   SODIUM 141 137   POTASSIUM 4.7 3.9   CHLORIDE 104 102   CO2 28.0 23.0   ANION GAP 9.0 12.0   BUN 15 16   CREATININE 1.07 0.88   EGFR 90.0 111.5   GLUCOSE 81 158*   CALCIUM 9.1 9.4         Lab 08/25/23  1133   TOTAL PROTEIN 6.9   ALBUMIN 4.0   GLOBULIN 2.9   ALT (SGPT) 68*   AST (SGOT) 46*   BILIRUBIN 0.2   ALK PHOS 78   LIPASE 33                     Brief Urine Lab Results  (Last  result in the past 365 days)        Color   Clarity   Blood   Leuk Est   Nitrite   Protein   CREAT   Urine HCG        08/25/23 1127 Dark Yellow   Clear   Negative   Trace   Negative   Trace                 Microbiology Results (last 10 days)       ** No results found for the last 240 hours. **            CT Abdomen Pelvis With Contrast    Result Date: 8/25/2023  Impression: Impression: Findings are compatible with acute uncomplicated sigmoid diverticulitis.  Electronically Signed: Nichelle De Leon MD  8/25/2023 12:53 PM EDT  Workstation ID: [id number]                 Labs Pending at Discharge:  Pending Labs       Order Current Status    Blood Culture - Blood, Arm, Left In process    Blood Culture - Blood, Arm, Right In process            Procedures Performed  None         Consults:   Consults       Date and Time Order Name Status Description    8/25/2023  1:32 PM Hospitalist (on-call MD unless specified)      8/25/2023  1:32 PM Gastroenterology (on-call MD unless specified) Completed               Discharge Details        Discharge Medications        New Medications        Instructions Start Date   amoxicillin-clavulanate 875-125 MG per tablet  Commonly known as: AUGMENTIN   1 tablet, Oral, Every 8 Hours   Start Date: August 27, 2023     dicyclomine 10 MG capsule  Commonly known as: BENTYL   20 mg, Oral, Every 6 Hours PRN             Continue These Medications        Instructions Start Date   acetaminophen 325 MG tablet  Commonly known as: TYLENOL   650 mg, Oral, Every 6 Hours PRN      albuterol sulfate  (90 Base) MCG/ACT inhaler  Commonly known as: PROVENTIL HFA;VENTOLIN HFA;PROAIR HFA   2 puffs, Inhalation, Every 4 Hours PRN      Anoro Ellipta 62.5-25 MCG/ACT aerosol powder  inhaler  Generic drug: Umeclidinium-Vilanterol   1 puff, Inhalation, Daily - RT      clobetasol 0.05 % external solution  Commonly known as: TEMOVATE   Apply 1 application  topically to the appropriate area as directed As Needed.       omeprazole 40 MG capsule  Commonly known as: priLOSEC   40 mg, Oral, Daily PRN      ondansetron 4 MG tablet  Commonly known as: ZOFRAN   4 mg, Oral, Every 6 Hours PRN      rosuvastatin 20 MG tablet  Commonly known as: CRESTOR   0.5 tablets, Oral, Nightly               Allergies   Allergen Reactions    Atorvastatin Unknown - Low Severity    Latex Hives    Latex Rash    Tamiflu [Oseltamivir Phosphate] Nausea And Vomiting         Discharge Disposition:   Home or Self Care    Diet:  Hospital:  Diet Order   Procedures    Diet: Gastrointestinal Diets; Fiber-Restricted; Texture: Regular Texture (IDDSI 7); Fluid Consistency: Thin (IDDSI 0)         Discharge Activity:   Activity as tolerated  Low residue/low fiber diet  Complete 14 days of Augmentin  Bentyl as needed for abdominal cramping  Follow-up with GI in 1 to 2 weeks      CODE STATUS:  Code Status and Medical Interventions:   Ordered at: 08/25/23 1510     Code Status (Patient has no pulse and is not breathing):    CPR (Attempt to Resuscitate)     Medical Interventions (Patient has pulse or is breathing):    Full Support           Time spent on Discharge including face to face service: 20 minutes      Signature: Electronically signed by ELIEL Mims, 08/26/23, 13:06 EDT.  [facility] Hospitalist Team

## 2023-08-26 NOTE — PLAN OF CARE
Problem: Adult Inpatient Plan of Care  Goal: Plan of Care Review  Outcome: Ongoing, Progressing  Flowsheets (Taken 8/26/2023 1140)  Progress: improving  Plan of Care Reviewed With: patient  Outcome Evaluation: possible discharge later this afternoon  Goal: Patient-Specific Goal (Individualized)  Outcome: Ongoing, Progressing  Goal: Absence of Hospital-Acquired Illness or Injury  Outcome: Ongoing, Progressing  Intervention: Identify and Manage Fall Risk  Recent Flowsheet Documentation  Taken 8/26/2023 1000 by Andre Calloway RN  Safety Promotion/Fall Prevention: safety round/check completed  Taken 8/26/2023 0810 by Andre Calloway RN  Safety Promotion/Fall Prevention: safety round/check completed  Intervention: Prevent Skin Injury  Recent Flowsheet Documentation  Taken 8/26/2023 0810 by Andre Calloway RN  Skin Protection: adhesive use limited  Goal: Optimal Comfort and Wellbeing  Outcome: Ongoing, Progressing  Intervention: Provide Person-Centered Care  Recent Flowsheet Documentation  Taken 8/26/2023 0810 by Andre Calloway RN  Trust Relationship/Rapport: care explained  Goal: Readiness for Transition of Care  Outcome: Ongoing, Progressing   Goal Outcome Evaluation:  Plan of Care Reviewed With: patient        Progress: improving  Outcome Evaluation: possible discharge later this afternoon

## 2023-08-30 LAB
BACTERIA SPEC AEROBE CULT: NORMAL
BACTERIA SPEC AEROBE CULT: NORMAL

## 2023-09-05 ENCOUNTER — OFFICE (AMBULATORY)
Dept: URBAN - METROPOLITAN AREA CLINIC 66 | Facility: CLINIC | Age: 38
End: 2023-09-05

## 2023-09-05 ENCOUNTER — TRANSCRIBE ORDERS (OUTPATIENT)
Dept: ADMINISTRATIVE | Facility: HOSPITAL | Age: 38
End: 2023-09-05
Payer: COMMERCIAL

## 2023-09-05 VITALS
HEIGHT: 71 IN | DIASTOLIC BLOOD PRESSURE: 80 MMHG | HEART RATE: 99 BPM | SYSTOLIC BLOOD PRESSURE: 130 MMHG | WEIGHT: 282 LBS

## 2023-09-05 DIAGNOSIS — K57.32 DIVERTICULITIS OF LARGE INTESTINE WITHOUT PERFORATION OR ABS: ICD-10-CM

## 2023-09-05 DIAGNOSIS — R74.8 ABNORMAL LEVELS OF OTHER SERUM ENZYMES: ICD-10-CM

## 2023-09-05 DIAGNOSIS — R13.10 DYSPHAGIA, UNSPECIFIED: ICD-10-CM

## 2023-09-05 DIAGNOSIS — K21.9 GASTRO-ESOPHAGEAL REFLUX DISEASE WITHOUT ESOPHAGITIS: ICD-10-CM

## 2023-09-05 DIAGNOSIS — R74.8 ACID PHOSPHATASE ELEVATED: Primary | ICD-10-CM

## 2023-09-05 PROCEDURE — 99214 OFFICE O/P EST MOD 30 MIN: CPT | Performed by: NURSE PRACTITIONER

## 2023-09-12 ENCOUNTER — HOSPITAL ENCOUNTER (OUTPATIENT)
Dept: ULTRASOUND IMAGING | Facility: HOSPITAL | Age: 38
Discharge: HOME | End: 2023-09-12
Payer: COMMERCIAL

## 2023-09-12 ENCOUNTER — TRANSCRIBE ORDERS (OUTPATIENT)
Dept: ADMINISTRATIVE | Facility: HOSPITAL | Age: 38
End: 2023-09-12
Payer: COMMERCIAL

## 2023-09-12 ENCOUNTER — LAB (OUTPATIENT)
Dept: LAB | Facility: HOSPITAL | Age: 38
End: 2023-09-12
Payer: COMMERCIAL

## 2023-09-12 DIAGNOSIS — K76.0 FATTY METAMORPHOSIS OF LIVER: ICD-10-CM

## 2023-09-12 DIAGNOSIS — R13.10 DYSPHAGIA, UNSPECIFIED TYPE: ICD-10-CM

## 2023-09-12 DIAGNOSIS — K21.9 CHRONIC GERD: ICD-10-CM

## 2023-09-12 DIAGNOSIS — R74.8 ELEVATED LIVER ENZYMES: ICD-10-CM

## 2023-09-12 DIAGNOSIS — K57.32 DIVERTICULITIS, COLON: ICD-10-CM

## 2023-09-12 DIAGNOSIS — K76.0 FATTY METAMORPHOSIS OF LIVER: Primary | ICD-10-CM

## 2023-09-12 DIAGNOSIS — R74.8 ACID PHOSPHATASE ELEVATED: ICD-10-CM

## 2023-09-12 LAB
ALBUMIN SERPL-MCNC: 4.6 G/DL (ref 3.5–5.2)
ALBUMIN/GLOB SERPL: 1.8 G/DL
ALP SERPL-CCNC: 81 U/L (ref 39–117)
ALT SERPL W P-5'-P-CCNC: 45 U/L (ref 1–41)
ANION GAP SERPL CALCULATED.3IONS-SCNC: 10 MMOL/L (ref 5–15)
AST SERPL-CCNC: 25 U/L (ref 1–40)
BASOPHILS # BLD AUTO: 0.03 10*3/MM3 (ref 0–0.2)
BASOPHILS NFR BLD AUTO: 0.5 % (ref 0–1.5)
BILIRUB SERPL-MCNC: 0.3 MG/DL (ref 0–1.2)
BUN SERPL-MCNC: 14 MG/DL (ref 6–20)
BUN/CREAT SERPL: 15.6 (ref 7–25)
CALCIUM SPEC-SCNC: 9.4 MG/DL (ref 8.6–10.5)
CHLORIDE SERPL-SCNC: 106 MMOL/L (ref 98–107)
CO2 SERPL-SCNC: 23 MMOL/L (ref 22–29)
CREAT SERPL-MCNC: 0.9 MG/DL (ref 0.76–1.27)
DEPRECATED RDW RBC AUTO: 44.4 FL (ref 37–54)
EGFRCR SERPLBLD CKD-EPI 2021: 110.7 ML/MIN/1.73
EOSINOPHIL # BLD AUTO: 0.27 10*3/MM3 (ref 0–0.4)
EOSINOPHIL NFR BLD AUTO: 4.4 % (ref 0.3–6.2)
ERYTHROCYTE [DISTWIDTH] IN BLOOD BY AUTOMATED COUNT: 13.1 % (ref 12.3–15.4)
FERRITIN SERPL-MCNC: 251 NG/ML (ref 30–400)
GLOBULIN UR ELPH-MCNC: 2.5 GM/DL
GLUCOSE SERPL-MCNC: 112 MG/DL (ref 65–99)
HAV IGM SERPL QL IA: NORMAL
HBV CORE IGM SERPL QL IA: NORMAL
HBV SURFACE AG SERPL QL IA: NORMAL
HCT VFR BLD AUTO: 47 % (ref 37.5–51)
HCV AB SER DONR QL: NORMAL
HGB BLD-MCNC: 15.7 G/DL (ref 13–17.7)
IGA1 MFR SER: 195 MG/DL (ref 70–400)
IGG1 SER-MCNC: 913 MG/DL (ref 700–1600)
IGM SERPL-MCNC: 100 MG/DL (ref 40–230)
IMM GRANULOCYTES # BLD AUTO: 0.01 10*3/MM3 (ref 0–0.05)
IMM GRANULOCYTES NFR BLD AUTO: 0.2 % (ref 0–0.5)
IRON 24H UR-MRATE: 108 MCG/DL (ref 59–158)
IRON SATN MFR SERPL: 30 % (ref 20–50)
LYMPHOCYTES # BLD AUTO: 2.65 10*3/MM3 (ref 0.7–3.1)
LYMPHOCYTES NFR BLD AUTO: 43.6 % (ref 19.6–45.3)
MCH RBC QN AUTO: 30.6 PG (ref 26.6–33)
MCHC RBC AUTO-ENTMCNC: 33.4 G/DL (ref 31.5–35.7)
MCV RBC AUTO: 91.6 FL (ref 79–97)
MONOCYTES # BLD AUTO: 0.55 10*3/MM3 (ref 0.1–0.9)
MONOCYTES NFR BLD AUTO: 9 % (ref 5–12)
NEUTROPHILS NFR BLD AUTO: 2.57 10*3/MM3 (ref 1.7–7)
NEUTROPHILS NFR BLD AUTO: 42.3 % (ref 42.7–76)
NRBC BLD AUTO-RTO: 0 /100 WBC (ref 0–0.2)
PLATELET # BLD AUTO: 254 10*3/MM3 (ref 140–450)
PMV BLD AUTO: 9.9 FL (ref 6–12)
POTASSIUM SERPL-SCNC: 4.2 MMOL/L (ref 3.5–5.2)
PROT SERPL-MCNC: 7.1 G/DL (ref 6–8.5)
RBC # BLD AUTO: 5.13 10*6/MM3 (ref 4.14–5.8)
SODIUM SERPL-SCNC: 139 MMOL/L (ref 136–145)
TIBC SERPL-MCNC: 355 MCG/DL (ref 298–536)
TRANSFERRIN SERPL-MCNC: 238 MG/DL (ref 200–360)
WBC NRBC COR # BLD: 6.08 10*3/MM3 (ref 3.4–10.8)

## 2023-09-12 PROCEDURE — 82977 ASSAY OF GGT: CPT

## 2023-09-12 PROCEDURE — 82247 BILIRUBIN TOTAL: CPT

## 2023-09-12 PROCEDURE — 84478 ASSAY OF TRIGLYCERIDES: CPT

## 2023-09-12 PROCEDURE — 82947 ASSAY GLUCOSE BLOOD QUANT: CPT

## 2023-09-12 PROCEDURE — 82172 ASSAY OF APOLIPOPROTEIN: CPT

## 2023-09-12 PROCEDURE — 86381 MITOCHONDRIAL ANTIBODY EACH: CPT

## 2023-09-12 PROCEDURE — 84466 ASSAY OF TRANSFERRIN: CPT

## 2023-09-12 PROCEDURE — 83010 ASSAY OF HAPTOGLOBIN QUANT: CPT

## 2023-09-12 PROCEDURE — 85025 COMPLETE CBC W/AUTO DIFF WBC: CPT

## 2023-09-12 PROCEDURE — 86038 ANTINUCLEAR ANTIBODIES: CPT

## 2023-09-12 PROCEDURE — 86015 ACTIN ANTIBODY EACH: CPT

## 2023-09-12 PROCEDURE — 36415 COLL VENOUS BLD VENIPUNCTURE: CPT

## 2023-09-12 PROCEDURE — 80074 ACUTE HEPATITIS PANEL: CPT

## 2023-09-12 PROCEDURE — 82784 ASSAY IGA/IGD/IGG/IGM EACH: CPT

## 2023-09-12 PROCEDURE — 83540 ASSAY OF IRON: CPT

## 2023-09-12 PROCEDURE — 83883 ASSAY NEPHELOMETRY NOT SPEC: CPT

## 2023-09-12 PROCEDURE — 80053 COMPREHEN METABOLIC PANEL: CPT

## 2023-09-12 PROCEDURE — 82465 ASSAY BLD/SERUM CHOLESTEROL: CPT

## 2023-09-12 PROCEDURE — 76705 ECHO EXAM OF ABDOMEN: CPT

## 2023-09-12 PROCEDURE — 82728 ASSAY OF FERRITIN: CPT

## 2023-09-13 LAB
A2 MACROGLOB SERPL-MCNC: 125 MG/DL (ref 110–276)
ALT SERPL W P-5'-P-CCNC: 50 IU/L (ref 0–55)
ANA SER QL: NEGATIVE
APO A-I SERPL-MCNC: 128 MG/DL (ref 101–178)
AST SERPL W P-5'-P-CCNC: 25 IU/L (ref 0–40)
BILIRUB SERPL-MCNC: 0.3 MG/DL (ref 0–1.2)
CHOLEST SERPL-MCNC: 283 MG/DL (ref 100–199)
FIBROSIS SCORING:: ABNORMAL
FIBROSIS STAGE SERPL QL: ABNORMAL
GGT SERPL-CCNC: 51 IU/L (ref 0–65)
GLUCOSE SERPL-MCNC: 112 MG/DL (ref 70–99)
HAPTOGLOB SERPL-MCNC: 175 MG/DL (ref 17–317)
LABORATORY COMMENT REPORT: ABNORMAL
LIVER FIBR SCORE SERPL CALC.FIBROSURE: 0.07 (ref 0–0.21)
LIVER STEATOSIS GRADE SERPL QL: ABNORMAL
LIVER STEATOSIS SCORE SERPL: 0.74 (ref 0–0.4)
MITOCHONDRIA M2 IGG SER-ACNC: <20 UNITS (ref 0–20)
NASH GRADE SERPL QL: ABNORMAL
NASH INTERPRETATION SERPL-IMP: ABNORMAL
NASH SCORE SERPL: 0.28 (ref 0–0.25)
NASH SCORING: ABNORMAL
SMA IGG SER-ACNC: 5 UNITS (ref 0–19)
STEATOSIS SCORING: ABNORMAL
TEST PERFORMANCE INFO SPEC: ABNORMAL
TEST PERFORMANCE INFO SPEC: ABNORMAL
TRIGL SERPL-MCNC: 197 MG/DL (ref 0–149)

## 2023-10-24 ENCOUNTER — OFFICE VISIT (OUTPATIENT)
Dept: FAMILY MEDICINE CLINIC | Facility: CLINIC | Age: 38
End: 2023-10-24
Payer: COMMERCIAL

## 2023-10-24 VITALS
SYSTOLIC BLOOD PRESSURE: 132 MMHG | OXYGEN SATURATION: 99 % | RESPIRATION RATE: 18 BRPM | DIASTOLIC BLOOD PRESSURE: 78 MMHG | WEIGHT: 286 LBS | BODY MASS INDEX: 40.04 KG/M2 | HEART RATE: 101 BPM | HEIGHT: 71 IN

## 2023-10-24 DIAGNOSIS — E78.2 MIXED HYPERLIPIDEMIA: ICD-10-CM

## 2023-10-24 DIAGNOSIS — Z00.00 ENCOUNTER FOR ANNUAL PHYSICAL EXAM: Primary | ICD-10-CM

## 2023-10-24 DIAGNOSIS — R03.0 ELEVATED BP WITHOUT DIAGNOSIS OF HYPERTENSION: ICD-10-CM

## 2023-10-24 RX ORDER — ROSUVASTATIN CALCIUM 10 MG/1
10 TABLET, COATED ORAL NIGHTLY
Qty: 90 TABLET | Refills: 1 | Status: SHIPPED | OUTPATIENT
Start: 2023-10-24

## 2023-10-24 RX ORDER — OMEPRAZOLE 40 MG/1
40 CAPSULE, DELAYED RELEASE ORAL DAILY PRN
Qty: 90 CAPSULE | Refills: 0 | Status: SHIPPED | OUTPATIENT
Start: 2023-10-24

## 2023-10-24 NOTE — PROGRESS NOTES
"Chief Complaint  Establish Care    Subjective        Caleb Vargas presents to [facility]  History of Present Illness  38 year old with PMH of HLD, GERD, REGGIE who presents today to establish care with me.  He has been trying to watch his diet and increase his exercise.   He is a  for [employer]    GERD  Does not have any symptoms of weight loss, denies any blood in stool. States he only gets it with bad meals    REGGIE  Not currently using his machine. Has had difficulties with wearing it and feeling well with it     HLD  On crestor 10 mg. Denies any myalgias, chest pain, or history of CAD.         Objective   Vital Signs:  /78 (BP Location: Left arm, Patient Position: Sitting, Cuff Size: Large Adult)   Pulse 101   Resp 18   Ht 180.3 cm (71\")   Wt 130 kg (286 lb)   SpO2 99%   BMI 39.89 kg/m²   Estimated body mass index is 39.89 kg/m² as calculated from the following:    Height as of this encounter: 180.3 cm (71\").    Weight as of this encounter: 130 kg (286 lb).     Class 2 Severe Obesity (BMI >=35 and <=39.9). Obesity-related health conditions include the following: obstructive sleep apnea, hypertension, and dyslipidemias. Obesity is unchanged. BMI is is above average; BMI management plan is completed. We discussed portion control and increasing exercise.        Physical Exam  Vitals and nursing note reviewed.   Constitutional:       General: He is not in acute distress.     Appearance: Normal appearance. He is not toxic-appearing.   HENT:      Head: Normocephalic and atraumatic.      Right Ear: Tympanic membrane, ear canal and external ear normal.      Left Ear: Tympanic membrane, ear canal and external ear normal.      Nose: Nose normal. No congestion or rhinorrhea.      Mouth/Throat:      Mouth: Mucous membranes are moist.      Pharynx: No oropharyngeal exudate.   Eyes:      General: No scleral icterus.        Right eye: No discharge.         Left eye: No " discharge.      Extraocular Movements: Extraocular movements intact.      Conjunctiva/sclera: Conjunctivae normal.      Pupils: Pupils are equal, round, and reactive to light.   Cardiovascular:      Rate and Rhythm: Normal rate and regular rhythm.      Pulses: Normal pulses.      Heart sounds: Normal heart sounds. No murmur heard.  Pulmonary:      Effort: Pulmonary effort is normal. No respiratory distress.      Breath sounds: Normal breath sounds. No wheezing.   Abdominal:      General: Abdomen is flat. Bowel sounds are normal. There is no distension.      Palpations: Abdomen is soft.      Tenderness: There is no abdominal tenderness. There is no guarding.   Musculoskeletal:         General: No swelling, tenderness or deformity. Normal range of motion.      Cervical back: Normal range of motion and neck supple. No rigidity or tenderness.   Lymphadenopathy:      Cervical: No cervical adenopathy.   Skin:     General: Skin is warm.      Capillary Refill: Capillary refill takes less than 2 seconds.   Neurological:      General: No focal deficit present.      Mental Status: He is alert and oriented to person, place, and time. Mental status is at baseline.      Cranial Nerves: No cranial nerve deficit.      Gait: Gait normal.   Psychiatric:         Mood and Affect: Mood normal.         Behavior: Behavior normal.         Thought Content: Thought content normal.         Judgment: Judgment normal.        Result Review :  The following data was reviewed by: Arturo Ac MD on 10/24/2023:  Common labs          8/25/2023    11:33 8/26/2023    01:41 9/12/2023    07:10   Common Labs   Glucose 158  81  112    BUN 16  15  14    Creatinine 0.88  1.07  0.90    Sodium 137  141  139    Potassium 3.9  4.7  4.2    Chloride 102  104  106    Calcium 9.4  9.1  9.4    Albumin 4.0   4.6    Total Bilirubin 0.2   0.3    Alkaline Phosphatase 78   81    AST (SGOT) 46   25    ALT (SGPT) 68   45    WBC 5.90  8.00  6.08    Hemoglobin 15.7  14.5   15.7    Hematocrit 46.4  43.2  47.0    Platelets 336  301  254    Triglycerides   197      Data reviewed : Previous notes             Assessment and Plan   Diagnoses and all orders for this visit:    1. Encounter for annual physical exam (Primary)  -     Hemoglobin A1c; Future  -     Lipid panel; Future  -     Comprehensive metabolic panel; Future  -     CBC w AUTO Differential; Future    2. Mixed hyperlipidemia    3. Elevated BP without diagnosis of hypertension    Other orders  -     rosuvastatin (CRESTOR) 10 MG tablet; Take 1 tablet by mouth Every Night.  Dispense: 90 tablet; Refill: 1  -     omeprazole (priLOSEC) 40 MG capsule; Take 1 capsule by mouth Daily As Needed (reflux).  Dispense: 90 capsule; Refill: 0    38 year old here to establish care with me. Overall, he is doing well. Trying to work on his diet and exercise. Have discussed weight loss medications    HLD  Continue Crestor 10 mg. Will need repeat lipid panel now. Currently controlled    Elevated BP  BP elevated at this visit. Discussed medications at this time, but he wanted to see if he could diet changes. Will follow up at next visit    REGGIE  Mentioned going back to sleep medicine and adjusting his CPAP. Could be playing a part in his BP    GERD  Currently well controlled. Continue Omeprazole as needed for reflux.     Obesity  Discussed weight loss and diet. Discussed diet medication. Will get an A1C         Follow Up   Return in about 1 year (around 10/24/2024) for Annual physical.  Patient was given instructions and counseling regarding his condition or for health maintenance advice. Please see specific information pulled into the AVS if appropriate.

## 2023-11-28 ENCOUNTER — TELEPHONE (OUTPATIENT)
Dept: FAMILY MEDICINE CLINIC | Facility: CLINIC | Age: 38
End: 2023-11-28
Payer: COMMERCIAL

## 2023-11-28 DIAGNOSIS — M79.2 NERVE PAIN: ICD-10-CM

## 2023-11-28 DIAGNOSIS — G43.001 MIGRAINE WITHOUT AURA AND WITH STATUS MIGRAINOSUS, NOT INTRACTABLE: Primary | ICD-10-CM

## 2023-11-28 NOTE — TELEPHONE ENCOUNTER
PATIENT BELIEVES HE HAS A PINCHED NERVE BECAUSE HE IS EXPERIENCING NUMBNESS IN HIS HANDS AND ARMS AND WOULD LIKE TO SEE A NEUROLOGIST. HE SAID THIS WAS DISCUSSED AT HIS LAST VISIT AND HE WOULD LIKE A REFERRAL PLACED.

## 2023-11-29 ENCOUNTER — TELEPHONE (OUTPATIENT)
Dept: FAMILY MEDICINE CLINIC | Facility: CLINIC | Age: 38
End: 2023-11-29
Payer: COMMERCIAL

## 2023-11-29 NOTE — TELEPHONE ENCOUNTER
Caller: Caleb Vargas    Relationship: Self    Best call back number:     Caleb Vargas (Self) [phone] (Mobile)       What was the call regarding: PATIENT SENT A [employer] MESSAGE REGARDING A REFERRAL TO NEUROLOGY, AND WANTED AN UPDATE ON THAT PLEASE     Is it okay if the provider responds through [employer]: CALL PLEASE

## 2023-11-30 DIAGNOSIS — R20.2 TINGLING IN EXTREMITIES: Primary | ICD-10-CM

## 2024-02-04 ENCOUNTER — APPOINTMENT (OUTPATIENT)
Dept: GENERAL RADIOLOGY | Facility: HOSPITAL | Age: 39
End: 2024-02-04
Payer: COMMERCIAL

## 2024-02-04 ENCOUNTER — HOSPITAL ENCOUNTER (EMERGENCY)
Facility: HOSPITAL | Age: 39
Discharge: HOME | End: 2024-02-04
Attending: EMERGENCY MEDICINE | Admitting: EMERGENCY MEDICINE
Payer: COMMERCIAL

## 2024-02-04 VITALS
OXYGEN SATURATION: 96 % | RESPIRATION RATE: 20 BRPM | HEIGHT: 71 IN | DIASTOLIC BLOOD PRESSURE: 78 MMHG | WEIGHT: 275 LBS | HEART RATE: 107 BPM | SYSTOLIC BLOOD PRESSURE: 137 MMHG | BODY MASS INDEX: 38.5 KG/M2 | TEMPERATURE: 97.7 F

## 2024-02-04 DIAGNOSIS — R11.2 NAUSEA AND VOMITING, UNSPECIFIED VOMITING TYPE: Primary | ICD-10-CM

## 2024-02-04 LAB
ALBUMIN SERPL-MCNC: 4.5 G/DL (ref 3.5–5.2)
ALBUMIN/GLOB SERPL: 1.7 G/DL
ALP SERPL-CCNC: 88 U/L (ref 39–117)
ALT SERPL W P-5'-P-CCNC: 50 U/L (ref 1–41)
ANION GAP SERPL CALCULATED.3IONS-SCNC: 12 MMOL/L (ref 5–15)
AST SERPL-CCNC: 31 U/L (ref 1–40)
BASOPHILS # BLD AUTO: 0 10*3/MM3 (ref 0–0.2)
BASOPHILS NFR BLD AUTO: 0.1 % (ref 0–1.5)
BILIRUB SERPL-MCNC: 0.6 MG/DL (ref 0–1.2)
BILIRUB UR QL STRIP: NEGATIVE
BUN SERPL-MCNC: 20 MG/DL (ref 6–20)
BUN/CREAT SERPL: 21.7 (ref 7–25)
CALCIUM SPEC-SCNC: 9 MG/DL (ref 8.6–10.5)
CHLORIDE SERPL-SCNC: 96 MMOL/L (ref 98–107)
CLARITY UR: CLEAR
CO2 SERPL-SCNC: 24 MMOL/L (ref 22–29)
COLOR UR: ABNORMAL
CREAT SERPL-MCNC: 0.92 MG/DL (ref 0.76–1.27)
DEPRECATED RDW RBC AUTO: 45.9 FL (ref 37–54)
EGFRCR SERPLBLD CKD-EPI 2021: 107.8 ML/MIN/1.73
EOSINOPHIL # BLD AUTO: 0.1 10*3/MM3 (ref 0–0.4)
EOSINOPHIL NFR BLD AUTO: 0.5 % (ref 0.3–6.2)
ERYTHROCYTE [DISTWIDTH] IN BLOOD BY AUTOMATED COUNT: 13.7 % (ref 12.3–15.4)
GLOBULIN UR ELPH-MCNC: 2.7 GM/DL
GLUCOSE SERPL-MCNC: 129 MG/DL (ref 65–99)
GLUCOSE UR STRIP-MCNC: NEGATIVE MG/DL
HCT VFR BLD AUTO: 47 % (ref 37.5–51)
HGB BLD-MCNC: 16.1 G/DL (ref 13–17.7)
HGB UR QL STRIP.AUTO: NEGATIVE
KETONES UR QL STRIP: ABNORMAL
LEUKOCYTE ESTERASE UR QL STRIP.AUTO: NEGATIVE
LIPASE SERPL-CCNC: 38 U/L (ref 13–60)
LYMPHOCYTES # BLD AUTO: 0.6 10*3/MM3 (ref 0.7–3.1)
LYMPHOCYTES NFR BLD AUTO: 5 % (ref 19.6–45.3)
MCH RBC QN AUTO: 31 PG (ref 26.6–33)
MCHC RBC AUTO-ENTMCNC: 34.2 G/DL (ref 31.5–35.7)
MCV RBC AUTO: 90.8 FL (ref 79–97)
MONOCYTES # BLD AUTO: 0.5 10*3/MM3 (ref 0.1–0.9)
MONOCYTES NFR BLD AUTO: 4 % (ref 5–12)
NEUTROPHILS NFR BLD AUTO: 11.5 10*3/MM3 (ref 1.7–7)
NEUTROPHILS NFR BLD AUTO: 90.4 % (ref 42.7–76)
NITRITE UR QL STRIP: NEGATIVE
NRBC BLD AUTO-RTO: 0 /100 WBC (ref 0–0.2)
NT-PROBNP SERPL-MCNC: <36 PG/ML (ref 0–450)
PH UR STRIP.AUTO: <=5 [PH] (ref 5–8)
PLATELET # BLD AUTO: 224 10*3/MM3 (ref 140–450)
PMV BLD AUTO: 7.5 FL (ref 6–12)
POTASSIUM SERPL-SCNC: 4.2 MMOL/L (ref 3.5–5.2)
PROT SERPL-MCNC: 7.2 G/DL (ref 6–8.5)
PROT UR QL STRIP: NEGATIVE
RBC # BLD AUTO: 5.18 10*6/MM3 (ref 4.14–5.8)
SODIUM SERPL-SCNC: 132 MMOL/L (ref 136–145)
SP GR UR STRIP: 1.03 (ref 1–1.03)
TROPONIN T SERPL HS-MCNC: 8 NG/L
UROBILINOGEN UR QL STRIP: ABNORMAL
WBC NRBC COR # BLD AUTO: 12.8 10*3/MM3 (ref 3.4–10.8)

## 2024-02-04 PROCEDURE — 84484 ASSAY OF TROPONIN QUANT: CPT | Performed by: NURSE PRACTITIONER

## 2024-02-04 PROCEDURE — 96376 TX/PRO/DX INJ SAME DRUG ADON: CPT

## 2024-02-04 PROCEDURE — 80053 COMPREHEN METABOLIC PANEL: CPT | Performed by: NURSE PRACTITIONER

## 2024-02-04 PROCEDURE — 25010000002 ONDANSETRON PER 1 MG: Performed by: NURSE PRACTITIONER

## 2024-02-04 PROCEDURE — 96375 TX/PRO/DX INJ NEW DRUG ADDON: CPT

## 2024-02-04 PROCEDURE — 25010000002 DIPHENHYDRAMINE PER 50 MG: Performed by: NURSE PRACTITIONER

## 2024-02-04 PROCEDURE — 85025 COMPLETE CBC W/AUTO DIFF WBC: CPT | Performed by: NURSE PRACTITIONER

## 2024-02-04 PROCEDURE — 93005 ELECTROCARDIOGRAM TRACING: CPT | Performed by: EMERGENCY MEDICINE

## 2024-02-04 PROCEDURE — 83880 ASSAY OF NATRIURETIC PEPTIDE: CPT | Performed by: NURSE PRACTITIONER

## 2024-02-04 PROCEDURE — 81003 URINALYSIS AUTO W/O SCOPE: CPT | Performed by: NURSE PRACTITIONER

## 2024-02-04 PROCEDURE — 25810000003 SODIUM CHLORIDE 0.9 % SOLUTION: Performed by: NURSE PRACTITIONER

## 2024-02-04 PROCEDURE — 25010000002 ONDANSETRON PER 1 MG: Performed by: EMERGENCY MEDICINE

## 2024-02-04 PROCEDURE — 99284 EMERGENCY DEPT VISIT MOD MDM: CPT

## 2024-02-04 PROCEDURE — 25010000002 METOCLOPRAMIDE PER 10 MG: Performed by: NURSE PRACTITIONER

## 2024-02-04 PROCEDURE — 83690 ASSAY OF LIPASE: CPT | Performed by: NURSE PRACTITIONER

## 2024-02-04 PROCEDURE — 71045 X-RAY EXAM CHEST 1 VIEW: CPT

## 2024-02-04 PROCEDURE — 96374 THER/PROPH/DIAG INJ IV PUSH: CPT

## 2024-02-04 RX ORDER — ONDANSETRON 2 MG/ML
4 INJECTION INTRAMUSCULAR; INTRAVENOUS ONCE
Status: COMPLETED | OUTPATIENT
Start: 2024-02-04 | End: 2024-02-04

## 2024-02-04 RX ORDER — METOCLOPRAMIDE HYDROCHLORIDE 5 MG/ML
10 INJECTION INTRAMUSCULAR; INTRAVENOUS ONCE
Status: COMPLETED | OUTPATIENT
Start: 2024-02-04 | End: 2024-02-04

## 2024-02-04 RX ORDER — DIPHENHYDRAMINE HYDROCHLORIDE 50 MG/ML
25 INJECTION INTRAMUSCULAR; INTRAVENOUS ONCE
Status: COMPLETED | OUTPATIENT
Start: 2024-02-04 | End: 2024-02-04

## 2024-02-04 RX ORDER — ONDANSETRON 4 MG/1
4 TABLET, ORALLY DISINTEGRATING ORAL EVERY 8 HOURS PRN
Qty: 10 TABLET | Refills: 0 | Status: SHIPPED | OUTPATIENT
Start: 2024-02-04

## 2024-02-04 RX ADMIN — ONDANSETRON 4 MG: 2 INJECTION INTRAMUSCULAR; INTRAVENOUS at 21:49

## 2024-02-04 RX ADMIN — SODIUM CHLORIDE 1000 ML: 9 INJECTION, SOLUTION INTRAVENOUS at 20:51

## 2024-02-04 RX ADMIN — METOCLOPRAMIDE 10 MG: 5 INJECTION, SOLUTION INTRAMUSCULAR; INTRAVENOUS at 22:44

## 2024-02-04 RX ADMIN — DIPHENHYDRAMINE HYDROCHLORIDE 25 MG: 50 INJECTION, SOLUTION INTRAMUSCULAR; INTRAVENOUS at 22:44

## 2024-02-04 RX ADMIN — ONDANSETRON 4 MG: 2 INJECTION INTRAMUSCULAR; INTRAVENOUS at 20:47

## 2024-02-04 NOTE — Clinical Note
[facility] EMERGENCY DEPARTMENT  [address] [place] [address]  Phone: [phone]    Caleb Vargas was seen and treated in our emergency department on 2/4/2024.  He may return to work on 02/06/2024.         Thank you for choosing [facility].    Kiara Sozua LPN

## 2024-02-04 NOTE — Clinical Note
[facility] EMERGENCY DEPARTMENT  [address] [place] [address]  Phone: [phone]    Caleb Vargas was seen and treated in our emergency department on 2/4/2024.  He may return to work on 02/06/2024.         Thank you for choosing [facility].    Kiara Souza LPN

## 2024-02-05 LAB
QT INTERVAL: 327 MS
QTC INTERVAL: 434 MS

## 2024-02-05 NOTE — DISCHARGE INSTRUCTIONS
Follow up with PCP, call for an appointment in 1-2 days, if you do not have a primary care provider please use patient connection [phone] to help establish care  Follow up with any specialist as indicated and discussed  Return to the ED for new or worsening symptoms  Take any medications as prescribed

## 2024-02-05 NOTE — ED PROVIDER NOTES
Subjective   Chief Complaint   Patient presents with    Vomiting     Pt reports started vomiting around 1400 after eating from a food truck.  Pt reports some increased SOA and thinks maybe he aspirated some while vomiting.          History provided by:  Patient and spouse    Patient is a 38-year-old male presents the ED with complaint of nausea, vomiting, shortness of breath.  He reports he thinks he got food poisoning from a food truck at the time he ate at 2:30 PM, started vomiting and having diarrhea afterwards.  Patient reports he has shortness of breath and is concerned he aspirated. Review of Systems   Constitutional:  Negative for chills and fever.   Respiratory:  Positive for shortness of breath.    Cardiovascular:  Negative for chest pain and leg swelling.   Gastrointestinal:  Positive for abdominal pain, diarrhea, nausea and vomiting.   Genitourinary:  Negative for dysuria.   Musculoskeletal:  Negative for back pain and neck pain.   Skin:  Negative for color change and rash.   Neurological:  Negative for headaches.       Past Medical History:   Diagnosis Date    Abdominal pain     Agitation     Allergies     Apnea     Change in weight     Chest congestion     Choking     Cough     GERD (gastroesophageal reflux disease)     High blood pressure     Obesity     Sleep disturbance     Snoring     Wheezing        Allergies   Allergen Reactions    Oseltamivir Nausea And Vomiting, GI Intolerance and Unknown (See Comments)    Atorvastatin Unknown - Low Severity    Latex Hives    Latex Rash    Tamiflu [Oseltamivir Phosphate] Nausea And Vomiting       Past Surgical History:   Procedure Laterality Date    CLEFT PALATE REPAIR      FOOT ARTHROPLASTY Left 2020    SINUS SURGERY      TONSILLECTOMY         Family History   Problem Relation Age of Onset    No Known Problems Mother     No Known Problems Father     No Known Problems Brother        Social History     Socioeconomic History    Marital status: Single   Tobacco Use  "   Smoking status: Every Day     Packs/day: .5     Types: Cigarettes    Smokeless tobacco: Never   Vaping Use    Vaping Use: Never used   Substance and Sexual Activity    Alcohol use: Yes     Comment: 5 beers every other week    Drug use: Never    Sexual activity: Defer           Objective   Physical Exam  Vitals and nursing note reviewed.   Constitutional:       Appearance: He is not toxic-appearing.   HENT:      Head: Normocephalic and atraumatic.      Mouth/Throat:      Mouth: Mucous membranes are moist.      Pharynx: Oropharynx is clear.   Eyes:      Extraocular Movements: Extraocular movements intact.      Conjunctiva/sclera: Conjunctivae normal.      Pupils: Pupils are equal, round, and reactive to light.   Cardiovascular:      Rate and Rhythm: Normal rate and regular rhythm.      Heart sounds: Normal heart sounds. No murmur heard.     No friction rub. No gallop.   Pulmonary:      Effort: Pulmonary effort is normal.      Breath sounds: Normal breath sounds.   Abdominal:      General: Bowel sounds are normal.      Palpations: Abdomen is soft.   Musculoskeletal:         General: Normal range of motion.      Cervical back: Normal range of motion and neck supple.   Skin:     General: Skin is warm and dry.      Capillary Refill: Capillary refill takes less than 2 seconds.   Neurological:      Mental Status: He is alert and oriented to person, place, and time.         Procedures           ED Course      /78   Pulse 107   Temp 97.7 °F (36.5 °C) (Oral)   Resp 20   Ht 180.3 cm (71\")   Wt 125 kg (275 lb)   SpO2 96%   BMI 38.35 kg/m²   Medications   ondansetron (ZOFRAN) injection 4 mg (4 mg Intravenous Given 2/4/24 2047)   sodium chloride 0.9 % bolus 1,000 mL (0 mL Intravenous Stopped 2/4/24 2320)   ondansetron (ZOFRAN) injection 4 mg (4 mg Intravenous Given 2/4/24 2149)   metoclopramide (REGLAN) injection 10 mg (10 mg Intravenous Given 2/4/24 2244)   diphenhydrAMINE (BENADRYL) injection 25 mg (25 mg " Intravenous Given 2/4/24 2244)     XR Chest 1 View    Result Date: 2/4/2024  Impression: No active disease Electronically Signed: Joe Reyes MD  2/4/2024 9:55 PM EST  Workstation ID: [id number]   Lab Results (last 24 hours)       Procedure Component Value Units Date/Time    CBC & Differential [[id number]]  (Abnormal) Collected: 02/04/24 2050    Specimen: Blood Updated: 02/04/24 2057    Narrative:      The following orders were created for panel order CBC & Differential.  Procedure                               Abnormality         Status                     ---------                               -----------         ------                     CBC Auto Differential[[id number]]        Abnormal            Final result                 Please view results for these tests on the individual orders.    Comprehensive Metabolic Panel [[id number]]  (Abnormal) Collected: 02/04/24 2050    Specimen: Blood Updated: 02/04/24 2126     Glucose 129 mg/dL      BUN 20 mg/dL      Creatinine 0.92 mg/dL      Sodium 132 mmol/L      Potassium 4.2 mmol/L      Comment: Slight hemolysis detected by analyzer. Result may be falsely elevated.        Chloride 96 mmol/L      CO2 24.0 mmol/L      Calcium 9.0 mg/dL      Total Protein 7.2 g/dL      Albumin 4.5 g/dL      ALT (SGPT) 50 U/L      AST (SGOT) 31 U/L      Alkaline Phosphatase 88 U/L      Total Bilirubin 0.6 mg/dL      Globulin 2.7 gm/dL      A/G Ratio 1.7 g/dL      BUN/Creatinine Ratio 21.7     Anion Gap 12.0 mmol/L      eGFR 107.8 mL/min/1.73     Narrative:      GFR Normal >60  Chronic Kidney Disease <60  Kidney Failure <15      Lipase [[id number]]  (Normal) Collected: 02/04/24 2050    Specimen: Blood Updated: 02/04/24 2126     Lipase 38 U/L     Single High Sensitivity Troponin T [[id number]]  (Normal) Collected: 02/04/24 2050    Specimen: Blood Updated: 02/04/24 2126     HS Troponin T 8 ng/L     Narrative:      High Sensitive Troponin T Reference Range:  <14.0 ng/L- Negative Female for AMI  <22.0  ng/L- Negative Male for AMI  >=14 - Abnormal Female indicating possible myocardial injury.  >=22 - Abnormal Male indicating possible myocardial injury.   Clinicians would have to utilize clinical acumen, EKG, Troponin, and serial changes to determine if it is an Acute Myocardial Infarction or myocardial injury due to an underlying chronic condition.         BNP [[id number]]  (Normal) Collected: 02/04/24 2050    Specimen: Blood Updated: 02/04/24 2126     proBNP <36.0 pg/mL     Narrative:      This assay is used as an aid in the diagnosis of individuals suspected of having heart failure. It can be used as an aid in the diagnosis of acute decompensated heart failure (ADHF) in patients presenting with signs and symptoms of ADHF to the emergency department (ED). In addition, NT-proBNP of <300 pg/mL indicates ADHF is not likely.    Age Range Result Interpretation  NT-proBNP Concentration (pg/mL:      <50             Positive            >450                   Gray                 300-450                    Negative             <300    50-75           Positive            >900                  Gray                300-900                  Negative            <300      >75             Positive            >1800                  Gray                300-1800                  Negative            <300    CBC Auto Differential [[id number]]  (Abnormal) Collected: 02/04/24 2050    Specimen: Blood Updated: 02/04/24 2057     WBC 12.80 10*3/mm3      RBC 5.18 10*6/mm3      Hemoglobin 16.1 g/dL      Hematocrit 47.0 %      MCV 90.8 fL      MCH 31.0 pg      MCHC 34.2 g/dL      RDW 13.7 %      RDW-SD 45.9 fl      MPV 7.5 fL      Platelets 224 10*3/mm3      Neutrophil % 90.4 %      Lymphocyte % 5.0 %      Monocyte % 4.0 %      Eosinophil % 0.5 %      Basophil % 0.1 %      Neutrophils, Absolute 11.50 10*3/mm3      Lymphocytes, Absolute 0.60 10*3/mm3      Monocytes, Absolute 0.50 10*3/mm3      Eosinophils, Absolute 0.10 10*3/mm3      Basophils,  Absolute 0.00 10*3/mm3      nRBC 0.0 /100 WBC     Urinalysis With Microscopic If Indicated (No Culture) - Urine, Clean Catch [[id number]]  (Abnormal) Collected: 02/04/24 2226    Specimen: Urine, Clean Catch Updated: 02/04/24 2233     Color, UA Dark Yellow     Appearance, UA Clear     pH, UA <=5.0     Specific Gravity, UA 1.028     Glucose, UA Negative     Ketones, UA Trace     Bilirubin, UA Negative     Blood, UA Negative     Protein, UA Negative     Leuk Esterase, UA Negative     Nitrite, UA Negative     Urobilinogen, UA 0.2 E.U./dL    Narrative:      Urine microscopic not indicated.                                                 Medical Decision Making  38-year-old male presents emergency department for the above complaint.  Differential diagnoses considered for patient presentation, this list is not all inclusive of diagnoses considered: Gastroenteritis, gastritis, colitis.  Patient IV established, labs obtained, given hydration, antiemetics.  Chest x-ray negative.  Labs as above.  Patient is able tolerate oral fluids, I feel symptoms most likely secondary to GE related to bad food, as he reports this started after he ate food from a food truck today. After medications here in the ED, he reports he feels better and would like to be discharged home.      Problems Addressed:  Nausea and vomiting, unspecified vomiting type: complicated acute illness or injury    Amount and/or Complexity of Data Reviewed  Labs: ordered.  Radiology: ordered.  ECG/medicine tests: ordered.    Risk  Prescription drug management.        Final diagnoses:   Nausea and vomiting, unspecified vomiting type       ED Disposition  ED Disposition       ED Disposition   Discharge    Condition   Stable    Comment   --               [facility] EMERGENCY DEPARTMENT  [address] [address] [phone]    As needed, If symptoms worsen    PATIENT CONNECTION - [facility] [address]  [phone]  Schedule an  appointment as soon as possible for a visit   Call for assistance with follow up with Primary care provider-call tomorrow.         Medication List        New Prescriptions      ondansetron ODT 4 MG disintegrating tablet  Commonly known as: ZOFRAN-ODT  Place 1 tablet on the tongue Every 8 (Eight) Hours As Needed for Nausea or Vomiting.               Where to Get Your Medications        These medications were sent to [facility]/pharmacy #[id number] - [facility] [place] - [address] [phone] [facility] [phone]   [address] [place] [address]      Phone: [phone]   ondansetron ODT 4 MG disintegrating tablet            Anna Arnold, APRN  02/05/24 0426

## 2024-03-07 ENCOUNTER — OFFICE VISIT (OUTPATIENT)
Dept: FAMILY MEDICINE CLINIC | Facility: CLINIC | Age: 39
End: 2024-03-07
Payer: COMMERCIAL

## 2024-03-07 ENCOUNTER — LAB (OUTPATIENT)
Dept: FAMILY MEDICINE CLINIC | Facility: CLINIC | Age: 39
End: 2024-03-07
Payer: COMMERCIAL

## 2024-03-07 VITALS
HEART RATE: 97 BPM | RESPIRATION RATE: 18 BRPM | OXYGEN SATURATION: 96 % | SYSTOLIC BLOOD PRESSURE: 130 MMHG | HEIGHT: 71 IN | DIASTOLIC BLOOD PRESSURE: 92 MMHG | BODY MASS INDEX: 41.58 KG/M2 | WEIGHT: 297 LBS

## 2024-03-07 DIAGNOSIS — E66.01 CLASS 3 SEVERE OBESITY DUE TO EXCESS CALORIES WITHOUT SERIOUS COMORBIDITY WITH BODY MASS INDEX (BMI) OF 40.0 TO 44.9 IN ADULT: Primary | ICD-10-CM

## 2024-03-07 DIAGNOSIS — Z00.00 ENCOUNTER FOR ANNUAL PHYSICAL EXAM: ICD-10-CM

## 2024-03-07 DIAGNOSIS — S69.92XA INJURY OF LEFT MIDDLE FINGER, INITIAL ENCOUNTER: ICD-10-CM

## 2024-03-07 LAB
ALBUMIN SERPL-MCNC: 4.2 G/DL (ref 3.5–5.2)
ALBUMIN/GLOB SERPL: 1.5 G/DL
ALP SERPL-CCNC: 75 U/L (ref 39–117)
ALT SERPL W P-5'-P-CCNC: 43 U/L (ref 1–41)
ANION GAP SERPL CALCULATED.3IONS-SCNC: 10.6 MMOL/L (ref 5–15)
AST SERPL-CCNC: 26 U/L (ref 1–40)
BASOPHILS # BLD AUTO: 0.04 10*3/MM3 (ref 0–0.2)
BASOPHILS NFR BLD AUTO: 0.7 % (ref 0–1.5)
BILIRUB SERPL-MCNC: 0.3 MG/DL (ref 0–1.2)
BUN SERPL-MCNC: 16 MG/DL (ref 6–20)
BUN/CREAT SERPL: 19 (ref 7–25)
CALCIUM SPEC-SCNC: 9.2 MG/DL (ref 8.6–10.5)
CHLORIDE SERPL-SCNC: 103 MMOL/L (ref 98–107)
CHOLEST SERPL-MCNC: 246 MG/DL (ref 0–200)
CO2 SERPL-SCNC: 24.4 MMOL/L (ref 22–29)
CREAT SERPL-MCNC: 0.84 MG/DL (ref 0.76–1.27)
DEPRECATED RDW RBC AUTO: 44 FL (ref 37–54)
EGFRCR SERPLBLD CKD-EPI 2021: 113.1 ML/MIN/1.73
EOSINOPHIL # BLD AUTO: 0.28 10*3/MM3 (ref 0–0.4)
EOSINOPHIL NFR BLD AUTO: 5.2 % (ref 0.3–6.2)
ERYTHROCYTE [DISTWIDTH] IN BLOOD BY AUTOMATED COUNT: 13.1 % (ref 12.3–15.4)
GLOBULIN UR ELPH-MCNC: 2.8 GM/DL
GLUCOSE SERPL-MCNC: 103 MG/DL (ref 65–99)
HBA1C MFR BLD: 6 % (ref 4.8–5.6)
HCT VFR BLD AUTO: 45 % (ref 37.5–51)
HDLC SERPL-MCNC: 40 MG/DL (ref 40–60)
HGB BLD-MCNC: 15.3 G/DL (ref 13–17.7)
IMM GRANULOCYTES # BLD AUTO: 0.02 10*3/MM3 (ref 0–0.05)
IMM GRANULOCYTES NFR BLD AUTO: 0.4 % (ref 0–0.5)
LDLC SERPL CALC-MCNC: 168 MG/DL (ref 0–100)
LDLC/HDLC SERPL: 4.13 {RATIO}
LYMPHOCYTES # BLD AUTO: 2.43 10*3/MM3 (ref 0.7–3.1)
LYMPHOCYTES NFR BLD AUTO: 44.9 % (ref 19.6–45.3)
MCH RBC QN AUTO: 30.9 PG (ref 26.6–33)
MCHC RBC AUTO-ENTMCNC: 34 G/DL (ref 31.5–35.7)
MCV RBC AUTO: 90.9 FL (ref 79–97)
MONOCYTES # BLD AUTO: 0.57 10*3/MM3 (ref 0.1–0.9)
MONOCYTES NFR BLD AUTO: 10.5 % (ref 5–12)
NEUTROPHILS NFR BLD AUTO: 2.07 10*3/MM3 (ref 1.7–7)
NEUTROPHILS NFR BLD AUTO: 38.3 % (ref 42.7–76)
NRBC BLD AUTO-RTO: 0 /100 WBC (ref 0–0.2)
PLATELET # BLD AUTO: 251 10*3/MM3 (ref 140–450)
PMV BLD AUTO: 9.7 FL (ref 6–12)
POTASSIUM SERPL-SCNC: 4.3 MMOL/L (ref 3.5–5.2)
PROT SERPL-MCNC: 7 G/DL (ref 6–8.5)
RBC # BLD AUTO: 4.95 10*6/MM3 (ref 4.14–5.8)
SODIUM SERPL-SCNC: 138 MMOL/L (ref 136–145)
TRIGL SERPL-MCNC: 204 MG/DL (ref 0–150)
TSH SERPL DL<=0.05 MIU/L-ACNC: 1.98 UIU/ML (ref 0.27–4.2)
VLDLC SERPL-MCNC: 38 MG/DL (ref 5–40)
WBC NRBC COR # BLD AUTO: 5.41 10*3/MM3 (ref 3.4–10.8)

## 2024-03-07 PROCEDURE — 80061 LIPID PANEL: CPT | Performed by: STUDENT IN AN ORGANIZED HEALTH CARE EDUCATION/TRAINING PROGRAM

## 2024-03-07 PROCEDURE — 80050 GENERAL HEALTH PANEL: CPT | Performed by: STUDENT IN AN ORGANIZED HEALTH CARE EDUCATION/TRAINING PROGRAM

## 2024-03-07 PROCEDURE — 36415 COLL VENOUS BLD VENIPUNCTURE: CPT

## 2024-03-07 PROCEDURE — 83036 HEMOGLOBIN GLYCOSYLATED A1C: CPT | Performed by: STUDENT IN AN ORGANIZED HEALTH CARE EDUCATION/TRAINING PROGRAM

## 2024-03-07 NOTE — PROGRESS NOTES
"Chief Complaint  Obesity (Discuss weight loss)    Subjective        Caleb Vargas presents to [facility]  History of Present Illness  Caleb is a 38-year-old with hyperlipidemia, elevated blood pressure who presents today to discuss weight loss medication and finger injury.      Obesity  States that he has been having difficulty with losing weight in the past.  Says he only eats 1 meal a day.  Is not very active but does go to the gym frequently.  States that he does not drink fluids with calories that much.  Would like to try weight loss medication if applicable.  Discussed bariatric surgery and he does not want to go through that at this time.    Left middle finger injury  Stated he does not remember when but it noticed it was swollen from possibly getting it smashed against something.  He is a  so he does use his fingers a lot.  Difficulty with fully closing his hand with pain.  Denies any redness or erythema around the area.  Denies any other injuries that he can be aware of.          Objective   Vital Signs:  /92   Pulse 97   Resp 18   Ht 180.3 cm (71\")   Wt 135 kg (297 lb)   SpO2 96%   BMI 41.42 kg/m²   Estimated body mass index is 41.42 kg/m² as calculated from the following:    Height as of this encounter: 180.3 cm (71\").    Weight as of this encounter: 135 kg (297 lb).               Physical Exam  Vitals and nursing note reviewed.   Constitutional:       General: He is not in acute distress.     Appearance: Normal appearance. He is obese. He is not toxic-appearing.   HENT:      Head: Normocephalic and atraumatic.      Right Ear: External ear normal.      Left Ear: External ear normal.      Nose: Nose normal. No congestion or rhinorrhea.      Mouth/Throat:      Mouth: Mucous membranes are moist.      Pharynx: No oropharyngeal exudate.   Eyes:      General: No scleral icterus.        Right eye: No discharge.         Left eye: No discharge.      " Extraocular Movements: Extraocular movements intact.      Conjunctiva/sclera: Conjunctivae normal.      Pupils: Pupils are equal, round, and reactive to light.   Cardiovascular:      Rate and Rhythm: Normal rate and regular rhythm.      Pulses: Normal pulses.      Heart sounds: Normal heart sounds. No murmur heard.  Pulmonary:      Effort: Pulmonary effort is normal. No respiratory distress.      Breath sounds: Normal breath sounds. No wheezing.   Abdominal:      General: Abdomen is flat. Bowel sounds are normal. There is no distension.      Palpations: Abdomen is soft.      Tenderness: There is no abdominal tenderness. There is no guarding.   Musculoskeletal:      Cervical back: Normal range of motion.      Comments: Left middle finger with DIP with minimal swelling, normal flexion and extension of the finger joints.   Skin:     General: Skin is warm.      Capillary Refill: Capillary refill takes less than 2 seconds.   Neurological:      General: No focal deficit present.      Mental Status: He is alert and oriented to person, place, and time. Mental status is at baseline.      Cranial Nerves: No cranial nerve deficit.      Gait: Gait normal.   Psychiatric:         Mood and Affect: Mood normal.         Behavior: Behavior normal.         Thought Content: Thought content normal.         Judgment: Judgment normal.      Result Review :    The following data was reviewed by: Arturo Ac MD on 03/07/2024:  Common labs          8/26/2023    01:41 9/12/2023    07:10 2/4/2024    20:50   Common Labs   Glucose 81  112  129    BUN 15  14  20    Creatinine 1.07  0.90  0.92    Sodium 141  139  132    Potassium 4.7  4.2  4.2    Chloride 104  106  96    Calcium 9.1  9.4  9.0    Albumin  4.6  4.5    Total Bilirubin  0.3  0.6    Alkaline Phosphatase  81  88    AST (SGOT)  25  31    ALT (SGPT)  45  50    WBC 8.00  6.08  12.80    Hemoglobin 14.5  15.7  16.1    Hematocrit 43.2  47.0  47.0    Platelets 301  254  224    Triglycerides   197       Data reviewed : Previous notes             Assessment and Plan     Diagnoses and all orders for this visit:    1. Class 3 severe obesity due to excess calories without serious comorbidity with body mass index (BMI) of 40.0 to 44.9 in adult (Primary)  -     Cancel: Hemoglobin A1c  -     Cancel: Comprehensive metabolic panel  -     TSH Rfx On Abnormal To Free T4    2. Injury of left middle finger, initial encounter    Will get baseline labs on him for an A1c standpoint.  If that elevated we can try Rybelsus as he is opposed to injections.  If that is negative we can try Wegovy or Zepbound.  Discussed bariatric surgery.  He does not want to pursue this at this time.    Finger injury  Unsure cause.  No erythema or other injuries.  Will continue to watch and wait and see how he does with this.  He may need referral to [facility] for further evaluation.       Follow Up     Return in about 6 months (around 9/7/2024) for Annual physical.  Patient was given instructions and counseling regarding his condition or for health maintenance advice. Please see specific information pulled into the AVS if appropriate.

## 2024-03-08 RX ORDER — ROSUVASTATIN CALCIUM 5 MG/1
5 TABLET, COATED ORAL DAILY
Qty: 90 TABLET | Refills: 1 | Status: SHIPPED | OUTPATIENT
Start: 2024-03-08

## 2024-03-27 ENCOUNTER — HOSPITAL ENCOUNTER (EMERGENCY)
Facility: HOSPITAL | Age: 39
Discharge: HOME | End: 2024-03-28
Attending: EMERGENCY MEDICINE
Payer: COMMERCIAL

## 2024-03-27 ENCOUNTER — APPOINTMENT (OUTPATIENT)
Dept: GENERAL RADIOLOGY | Facility: HOSPITAL | Age: 39
End: 2024-03-27
Payer: COMMERCIAL

## 2024-03-27 VITALS
WEIGHT: 280 LBS | RESPIRATION RATE: 17 BRPM | HEART RATE: 81 BPM | DIASTOLIC BLOOD PRESSURE: 89 MMHG | SYSTOLIC BLOOD PRESSURE: 160 MMHG | BODY MASS INDEX: 39.2 KG/M2 | HEIGHT: 71 IN | TEMPERATURE: 97.7 F | OXYGEN SATURATION: 95 %

## 2024-03-27 DIAGNOSIS — M25.572 ACUTE LEFT ANKLE PAIN: ICD-10-CM

## 2024-03-27 DIAGNOSIS — S99.912A INJURY OF LEFT ANKLE, INITIAL ENCOUNTER: Primary | ICD-10-CM

## 2024-03-27 PROCEDURE — 63710000001 ONDANSETRON ODT 4 MG TABLET DISPERSIBLE

## 2024-03-27 PROCEDURE — 73610 X-RAY EXAM OF ANKLE: CPT

## 2024-03-27 PROCEDURE — 99283 EMERGENCY DEPT VISIT LOW MDM: CPT

## 2024-03-27 RX ORDER — OXYCODONE HYDROCHLORIDE 5 MG/1
5 TABLET ORAL ONCE
Status: COMPLETED | OUTPATIENT
Start: 2024-03-27 | End: 2024-03-27

## 2024-03-27 RX ORDER — ONDANSETRON 4 MG/1
4 TABLET, ORALLY DISINTEGRATING ORAL ONCE
Status: COMPLETED | OUTPATIENT
Start: 2024-03-27 | End: 2024-03-27

## 2024-03-27 RX ADMIN — OXYCODONE 5 MG: 5 TABLET ORAL at 23:52

## 2024-03-27 RX ADMIN — ONDANSETRON 4 MG: 4 TABLET, ORALLY DISINTEGRATING ORAL at 23:52

## 2024-03-28 RX ORDER — METHOCARBAMOL 750 MG/1
750 TABLET, FILM COATED ORAL 3 TIMES DAILY PRN
Qty: 21 TABLET | Refills: 0 | Status: SHIPPED | OUTPATIENT
Start: 2024-03-28 | End: 2024-04-04

## 2024-03-28 RX ORDER — ACETAMINOPHEN AND CODEINE PHOSPHATE 300; 30 MG/1; MG/1
1 TABLET ORAL EVERY 4 HOURS PRN
Qty: 8 TABLET | Refills: 0 | Status: SHIPPED | OUTPATIENT
Start: 2024-03-28 | End: 2024-03-30

## 2024-03-28 NOTE — DISCHARGE INSTRUCTIONS
Take Robaxin as needed for muscle spasms and Tylenol 3 as needed for pain.  You may also take NSAIDs as needed for breakthrough pain.  Keep leg elevated when not in use.  Alternate use of ice and heat for 20 minutes at a time several times a day.  Rest.    Follow-up with podiatrist for further evaluation and management.  Follow-up with primary care provider as needed.    Return to the ER for new or worsening symptoms.

## 2024-03-28 NOTE — ED PROVIDER NOTES
Subjective   History of Present Illness  Patient is a 38-year-old obese  male with history of hypertension who presents to the emergency room with complaints of left ankle pain and swelling that started tonight when he was getting ready for bed.  He states that he was stepping onto his left foot when he heard and felt a loud crack in his ankle and experienced immediate pain afterwards.  Patient states that he has had issues with his ankle for several months starting October what has been significantly worse after his injury tonight.  Earlier today, patient saw a podiatrist for consultation regarding surgery and an MRI was ordered but patient has not had it done yet.  He denies any loss of sensation distal to the injury. He is not diabetic.  He has several allergies including atorvastatin, latex, Tamiflu and oseltamivir.  He denies use of drugs, alcohol and tobacco.       Review of Systems   Constitutional:  Positive for activity change. Negative for fever.   HENT:  Negative for congestion.    Respiratory:  Negative for shortness of breath.    Cardiovascular:  Negative for chest pain.   Gastrointestinal:  Negative for abdominal pain.   Genitourinary:  Negative for dysuria.   Musculoskeletal:  Positive for arthralgias and joint swelling.   Neurological:  Negative for syncope and headaches.   Psychiatric/Behavioral:  The patient is not nervous/anxious.    All other systems reviewed and are negative.      Past Medical History:   Diagnosis Date    Abdominal pain     Agitation     Allergies     Apnea     Change in weight     Chest congestion     Choking     Cough     GERD (gastroesophageal reflux disease)     High blood pressure     Obesity     Sleep disturbance     Snoring     Wheezing        Allergies   Allergen Reactions    Oseltamivir Nausea And Vomiting, GI Intolerance and Unknown (See Comments)    Atorvastatin Unknown - Low Severity    Latex Hives    Latex Rash    Tamiflu [Oseltamivir Phosphate] Nausea And  "Vomiting       Past Surgical History:   Procedure Laterality Date    CLEFT PALATE REPAIR      FOOT ARTHROPLASTY Left 2020    SINUS SURGERY      TONSILLECTOMY         Family History   Problem Relation Age of Onset    No Known Problems Mother     No Known Problems Father     No Known Problems Brother        Social History     Socioeconomic History    Marital status: Single   Tobacco Use    Smoking status: Every Day     Current packs/day: 0.50     Types: Cigarettes    Smokeless tobacco: Never   Vaping Use    Vaping status: Never Used   Substance and Sexual Activity    Alcohol use: Yes     Comment: 5 beers every other week    Drug use: Never    Sexual activity: Defer           Objective   Physical Exam  Vitals and nursing note reviewed.   Constitutional:       General: He is not in acute distress.     Appearance: Normal appearance. He is obese. He is not ill-appearing.   HENT:      Head: Normocephalic and atraumatic.   Cardiovascular:      Rate and Rhythm: Normal rate and regular rhythm.      Heart sounds: Normal heart sounds. No murmur heard.  Pulmonary:      Effort: No respiratory distress.      Breath sounds: Normal breath sounds.   Abdominal:      General: Bowel sounds are normal.      Tenderness: There is no abdominal tenderness.   Musculoskeletal:         General: Swelling, tenderness and signs of injury present. No deformity.      Right knee: Normal.      Left knee: Normal.      Right ankle: Normal.      Left ankle: Swelling present. No deformity. Tenderness present over the lateral malleolus and medial malleolus. Decreased range of motion.   Neurological:      Mental Status: He is alert and oriented to person, place, and time.         Procedures           ED Course      /89   Pulse 81   Temp 97.7 °F (36.5 °C) (Oral)   Resp 17   Ht 180.3 cm (71\")   Wt 127 kg (280 lb)   SpO2 95%   BMI 39.05 kg/m²   Labs Reviewed - No data to display  Medications   ondansetron ODT (ZOFRAN-ODT) disintegrating tablet 4 " mg (4 mg Oral Given 3/27/24 5912)   oxyCODONE (ROXICODONE) immediate release tablet 5 mg (5 mg Oral Given 3/27/24 2352)     XR Ankle 3+ View Left    Result Date: 3/28/2024  Impression: Mild degenerative change without acute osseous abnormality. Electronically Signed: Balbir Saleem MD  3/28/2024 12:00 AM EDT  Workstation ID: [id number]                                          Medical Decision Making  Amount and/or Complexity of Data Reviewed  Radiology: ordered.    Patient is a 38-year-old obese  male with no prior medical history who presents the emergency room with complaints of left ankle injury after hearing a pop and experiencing severe pain tonight while he was getting for bed.  On exam, patient has swelling noted to his left ankle.  There is tenderness noted in both the medial and lateral malleolus.  Patient has decreased range of motion due to the amount of pain but there is no deformity noted.  No ecchymosis.  Distal pulses are strong and equal bilaterally.  His cap refill remains less than 2 seconds.  Normal S1 desisted without clicks murmurs.  No JVD.  Lungs are clear to auscultation in all fields.  Initial differentials include ankle fracture, ankle dislocation, ankle sprain.  This is not a complete list.    Due to overwhelming hospital census and boarding inpatients in the emergency room, patient received above examination in hallway bed.  Examination was made to the best of provider's ability with respect to patient privacy and confidentiality.  His symptoms were managed with Zofran and oxycodone.  My interpretation of x-ray reveals no dislocation or acute abnormality.  This is concurrent with radiologist.  Upon reassessment, patient reports improvement in pain.  Results were discussed with the patient he was very happy to hear that no fractures were seen on x-rays.  Patient has a small splint at home but was offered walking boot today for comfort and support.  Patient is agreeable and he was  advised to remove walking boot several times every day for stretching of his ankle and range of motion exercises.  Patient verbalized understanding and is agreeable.  He will be discharged with prescription of Tylenol 3 and Robaxin with instruction to follow-up to a podiatrist at his earliest convenience.  Patient has remained hemodynamically stable and is in no acute distress.    I discussed with the patient the risks and benefits associated with opiate/narcotic pain medication today.  Based on patient's exam findings and assessment, I do feel it appropriate to prescribe a short course of opiate/narcotic pain medication from the emergency department.  The patient was advised of the risks associated with such medication, including risk of dependency.  Patient was advised of medication administration instructions, appropriate use, potential side effects and adverse reactions.  Acknowledged and verbalized understanding and agrees to treatment.    INSPECT report collected and reviewed on patient prior to prescibing controlled substance/medication. Patient condition considered appropriate for narcotic/controlled prescription.  Patient has filled tramadol in 2022 but had no controlled substances prescribed to him since then.    I discussed the findings with patient who voices understanding of discharge instructions, signs and symptoms requiring return to the ED; discharged improved and stable condition with follow-up for reevaluation.    Patient is aware that discharge does not mean that nothing is wrong but it indicates no emergency is present and they must continue care with follow-up as given below or physician of their choice.    This document is intended for medical expert use only.  Reading of this document by patients and/or patient's family without participating medical staff guidance may result in misinterpretation and unintended morbidity.  Any interpretation of such data is the responsibility of the patient  and/or family member responsible for the patient in concert with their primary or specialist providers, not to be left for sources of online search as such as [employer], [employer] or similar queries.  Relying on these approaches to knowledge may result in misinterpretation, misguided goals of care and even death should patient or family members try recommendations outside of the realm of professional medical care in a supervised inpatient environment.    This medical document was created using Dragon dictation system. Some errors in speech recognition may occur.    Final diagnoses:   Injury of left ankle, initial encounter   Acute left ankle pain       ED Disposition  ED Disposition       ED Disposition   Discharge    Condition   Stable    Comment   --               Arturo Ac MD  [address] [facility]  [address]  [facility] [place] [address]  [phone]          BALDEMAR Braga DPM  [address] [facility] [address]  [place] [address] [phone]               Medication List        New Prescriptions      acetaminophen-codeine 300-30 MG per tablet  Commonly known as: TYLENOL with CODEINE #3  Take 1 tablet by mouth Every 4 (Four) Hours As Needed for Moderate Pain for up to 2 days.     methocarbamol 750 MG tablet  Commonly known as: ROBAXIN  Take 1 tablet by mouth 3 (Three) Times a Day As Needed for Muscle Spasms for up to 7 days.               Where to Get Your Medications        These medications were sent to [facility]/pharmacy #[id number] - [place], [place] - [address] [phone] [place] [phone]   [address] [place] [address]      Phone: [phone]   acetaminophen-codeine 300-30 MG per tablet  methocarbamol 750 MG tablet            Francheska Lozano, APRN  03/28/24 0107

## 2024-04-04 ENCOUNTER — TELEPHONE (OUTPATIENT)
Dept: FAMILY MEDICINE CLINIC | Facility: CLINIC | Age: 39
End: 2024-04-04

## 2024-04-04 DIAGNOSIS — R20.2 TINGLING IN EXTREMITIES: ICD-10-CM

## 2024-04-04 DIAGNOSIS — R29.898 BILATERAL ARM WEAKNESS: Primary | ICD-10-CM

## 2024-04-04 NOTE — TELEPHONE ENCOUNTER
Caller: Caleb Vargas    Relationship: Self    Best call back number:     [phone]       What is the medical concern/diagnosis: PAIN IN ARMS,LOSING FEELING IN ARMS      What specialty or service is being requested: NEUROLOGIST    What is the provider, practice or medical service name:     What is the office location:     What is the office phone number:     Any additional details:

## 2024-04-04 NOTE — TELEPHONE ENCOUNTER
Caller: Caleb Vargas    Relationship: Self    Best call back number: [phone]     Requested Prescriptions:   Requested Prescriptions     Pending Prescriptions Disp Refills    Tirzepatide-Weight Management (ZEPBOUND) 2.5 MG/0.5ML solution auto-injector 2 mL 0     Sig: Inject 0.5 mL under the skin into the appropriate area as directed 1 (One) Time Per Week.        Pharmacy where request should be sent: [facility]/PHARMACY #[id number] - [address] [phone] [facility] [phone] FX     Last office visit with prescribing clinician: 3/7/2024   Last telemedicine visit with prescribing clinician: Visit date not found   Next office visit with prescribing clinician: Visit date not found     Additional details provided by patient:     Does the patient have less than a 3 day supply:  [x] Yes  [] No    Would you like a call back once the refill request has been completed: [x] Yes [] No    If the office needs to give you a call back, can they leave a voicemail: [] Yes [] No    Donte Ryan Rep   04/04/24 09:14 EDT

## 2024-07-01 RX ORDER — ROSUVASTATIN CALCIUM 10 MG/1
10 TABLET, COATED ORAL
Qty: 90 TABLET | Refills: 1 | Status: SHIPPED | OUTPATIENT
Start: 2024-07-01

## 2024-07-01 NOTE — TELEPHONE ENCOUNTER
Caller: Caleb Vargas    Relationship: Self    Best call back number: [phone]    Requested Prescriptions:   Requested Prescriptions     Pending Prescriptions Disp Refills    rosuvastatin (CRESTOR) 10 MG tablet 90 tablet      Sig: Take 1 tablet by mouth every night at bedtime.        Pharmacy where request should be sent: [facility]/PHARMACY #[id number] - [facility] [place] [address] [phone] [facility] [phone] FX     Last office visit with prescribing clinician: 3/7/2024   Last telemedicine visit with prescribing clinician: Visit date not found   Next office visit with prescribing clinician: Visit date not found     Additional details provided by patient: OUT OF MEDICATION.  HE WOULD LIKE REFILLS TOO    Does the patient have less than a 3 day supply:  [x] Yes  [] No    Would you like a call back once the refill request has been completed: [] Yes [x] No    If the office needs to give you a call back, can they leave a voicemail: [] Yes [x] No    Donte Jett Rep   07/01/24 14:31 EDT

## 2024-07-24 ENCOUNTER — TELEPHONE (OUTPATIENT)
Dept: FAMILY MEDICINE CLINIC | Facility: CLINIC | Age: 39
End: 2024-07-24
Payer: COMMERCIAL

## 2024-07-24 DIAGNOSIS — K57.92 DIVERTICULITIS: Primary | ICD-10-CM

## 2024-07-24 RX ORDER — AMOXICILLIN AND CLAVULANATE POTASSIUM 875; 125 MG/1; MG/1
1 TABLET, FILM COATED ORAL 2 TIMES DAILY
Qty: 20 TABLET | Refills: 0 | Status: SHIPPED | OUTPATIENT
Start: 2024-07-24 | End: 2024-08-03

## 2024-07-24 NOTE — TELEPHONE ENCOUNTER
Caller: Caleb Vargas    Relationship: Self    Best call back number: [phone]    What medication are you requesting: ANTIBIOTIC     What are your current symptoms: PAIN IN THE STOMACH     How long have you been experiencing symptoms: 1-2 DAYS    Have you had these symptoms before:    [x] Yes  [] No    Have you been treated for these symptoms before:   [x] Yes  [] No    If a prescription is needed, what is your preferred pharmacy and phone number: [facility]ACY #[id number] - [facility] [place] [address] [phone] [facility] [phone]      Additional notes:    PATIENT CALLED AND SAID THAT HE THINKS HE IS HAVING A FLARE OF DIVERTICULITIS AND WOULD LIKE TO HAVE AN ANTIBIOTIC SENT IN

## 2024-08-14 ENCOUNTER — APPOINTMENT (OUTPATIENT)
Dept: CT IMAGING | Facility: HOSPITAL | Age: 39
End: 2024-08-14
Payer: OTHER MISCELLANEOUS

## 2024-08-14 ENCOUNTER — HOSPITAL ENCOUNTER (EMERGENCY)
Facility: HOSPITAL | Age: 39
Discharge: HOME | End: 2024-08-14
Attending: EMERGENCY MEDICINE
Payer: OTHER MISCELLANEOUS

## 2024-08-14 VITALS
SYSTOLIC BLOOD PRESSURE: 129 MMHG | HEIGHT: 71 IN | RESPIRATION RATE: 16 BRPM | OXYGEN SATURATION: 98 % | HEART RATE: 70 BPM | WEIGHT: 277.78 LBS | TEMPERATURE: 98 F | DIASTOLIC BLOOD PRESSURE: 83 MMHG | BODY MASS INDEX: 38.89 KG/M2

## 2024-08-14 DIAGNOSIS — S09.90XA INJURY OF HEAD, INITIAL ENCOUNTER: Primary | ICD-10-CM

## 2024-08-14 PROCEDURE — 70450 CT HEAD/BRAIN W/O DYE: CPT

## 2024-08-14 PROCEDURE — 99284 EMERGENCY DEPT VISIT MOD MDM: CPT

## 2024-08-14 NOTE — Clinical Note
[facility] EMERGENCY DEPARTMENT  [address] [facility] [place] [address]  Phone: [phone]    Caleb Vargas was seen and treated in our emergency department on 8/14/2024.  He may return to work on 08/16/2024.         Thank you for choosing [facility].    Kings Jose MD

## 2024-08-15 NOTE — DISCHARGE INSTRUCTIONS
Follow-up with your primary doctor as needed.  Return to the emergency room for any new or worsening symptoms or if you have any other questions or concerns.  Take Tylenol or ibuprofen as needed for pain.
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
## 2024-08-25 ENCOUNTER — APPOINTMENT (OUTPATIENT)
Dept: GENERAL RADIOLOGY | Facility: HOSPITAL | Age: 39
End: 2024-08-25
Payer: COMMERCIAL

## 2024-08-25 ENCOUNTER — HOSPITAL ENCOUNTER (OUTPATIENT)
Facility: HOSPITAL | Age: 39
Discharge: HOME | End: 2024-08-27
Attending: EMERGENCY MEDICINE | Admitting: EMERGENCY MEDICINE
Payer: COMMERCIAL

## 2024-08-25 DIAGNOSIS — R07.9 CHEST PAIN, UNSPECIFIED TYPE: ICD-10-CM

## 2024-08-25 DIAGNOSIS — R94.39 ABNORMAL NUCLEAR STRESS TEST: Primary | ICD-10-CM

## 2024-08-25 DIAGNOSIS — R00.2 HEART PALPITATIONS: ICD-10-CM

## 2024-08-25 DIAGNOSIS — R07.89 CHEST DISCOMFORT: ICD-10-CM

## 2024-08-25 LAB
ALBUMIN SERPL-MCNC: 4.6 G/DL (ref 3.5–5.2)
ALBUMIN/GLOB SERPL: 1.6 G/DL
ALP SERPL-CCNC: 88 U/L (ref 39–117)
ALT SERPL W P-5'-P-CCNC: 60 U/L (ref 1–41)
AMORPH URATE CRY URNS QL MICRO: NORMAL /HPF
ANION GAP SERPL CALCULATED.3IONS-SCNC: 9.2 MMOL/L (ref 5–15)
AST SERPL-CCNC: 30 U/L (ref 1–40)
B PARAPERT DNA SPEC QL NAA+PROBE: NOT DETECTED
B PERT DNA SPEC QL NAA+PROBE: NOT DETECTED
BACTERIA UR QL AUTO: NORMAL /HPF
BASOPHILS # BLD AUTO: 0.05 10*3/MM3 (ref 0–0.2)
BASOPHILS NFR BLD AUTO: 0.9 % (ref 0–1.5)
BILIRUB SERPL-MCNC: 0.3 MG/DL (ref 0–1.2)
BILIRUB UR QL STRIP: NEGATIVE
BUN SERPL-MCNC: 17 MG/DL (ref 6–20)
BUN/CREAT SERPL: 17.2 (ref 7–25)
C PNEUM DNA NPH QL NAA+NON-PROBE: NOT DETECTED
CALCIUM SPEC-SCNC: 9.8 MG/DL (ref 8.6–10.5)
CHLORIDE SERPL-SCNC: 103 MMOL/L (ref 98–107)
CHOLEST SERPL-MCNC: 179 MG/DL (ref 0–200)
CLARITY UR: CLEAR
CO2 SERPL-SCNC: 26.8 MMOL/L (ref 22–29)
COLOR UR: YELLOW
CREAT SERPL-MCNC: 0.99 MG/DL (ref 0.76–1.27)
D DIMER PPP FEU-MCNC: <0.19 MG/L (FEU) (ref 0–0.5)
DEPRECATED RDW RBC AUTO: 43.1 FL (ref 37–54)
EGFRCR SERPLBLD CKD-EPI 2021: 98.1 ML/MIN/1.73
EOSINOPHIL # BLD AUTO: 0.18 10*3/MM3 (ref 0–0.4)
EOSINOPHIL NFR BLD AUTO: 3.2 % (ref 0.3–6.2)
ERYTHROCYTE [DISTWIDTH] IN BLOOD BY AUTOMATED COUNT: 12.6 % (ref 12.3–15.4)
FLUAV SUBTYP SPEC NAA+PROBE: NOT DETECTED
FLUBV RNA ISLT QL NAA+PROBE: NOT DETECTED
GEN 5 2HR TROPONIN T REFLEX: 7 NG/L
GLOBULIN UR ELPH-MCNC: 2.9 GM/DL
GLUCOSE SERPL-MCNC: 101 MG/DL (ref 65–99)
GLUCOSE UR STRIP-MCNC: NEGATIVE MG/DL
HADV DNA SPEC NAA+PROBE: NOT DETECTED
HCOV 229E RNA SPEC QL NAA+PROBE: NOT DETECTED
HCOV HKU1 RNA SPEC QL NAA+PROBE: NOT DETECTED
HCOV NL63 RNA SPEC QL NAA+PROBE: NOT DETECTED
HCOV OC43 RNA SPEC QL NAA+PROBE: NOT DETECTED
HCT VFR BLD AUTO: 47.5 % (ref 37.5–51)
HDLC SERPL-MCNC: 33 MG/DL (ref 40–60)
HGB BLD-MCNC: 15.5 G/DL (ref 13–17.7)
HGB UR QL STRIP.AUTO: NEGATIVE
HMPV RNA NPH QL NAA+NON-PROBE: NOT DETECTED
HOLD SPECIMEN: NORMAL
HOLD SPECIMEN: NORMAL
HPIV1 RNA ISLT QL NAA+PROBE: NOT DETECTED
HPIV2 RNA SPEC QL NAA+PROBE: NOT DETECTED
HPIV3 RNA NPH QL NAA+PROBE: NOT DETECTED
HPIV4 P GENE NPH QL NAA+PROBE: NOT DETECTED
HYALINE CASTS UR QL AUTO: NORMAL /LPF
IMM GRANULOCYTES # BLD AUTO: 0.02 10*3/MM3 (ref 0–0.05)
IMM GRANULOCYTES NFR BLD AUTO: 0.4 % (ref 0–0.5)
KETONES UR QL STRIP: ABNORMAL
LDLC SERPL CALC-MCNC: 112 MG/DL (ref 0–100)
LDLC/HDLC SERPL: 3.27 {RATIO}
LEUKOCYTE ESTERASE UR QL STRIP.AUTO: ABNORMAL
LYMPHOCYTES # BLD AUTO: 2.5 10*3/MM3 (ref 0.7–3.1)
LYMPHOCYTES NFR BLD AUTO: 45 % (ref 19.6–45.3)
M PNEUMO IGG SER IA-ACNC: NOT DETECTED
MAGNESIUM SERPL-MCNC: 2.2 MG/DL (ref 1.6–2.6)
MCH RBC QN AUTO: 30.6 PG (ref 26.6–33)
MCHC RBC AUTO-ENTMCNC: 32.6 G/DL (ref 31.5–35.7)
MCV RBC AUTO: 93.7 FL (ref 79–97)
MONOCYTES # BLD AUTO: 0.52 10*3/MM3 (ref 0.1–0.9)
MONOCYTES NFR BLD AUTO: 9.4 % (ref 5–12)
NEUTROPHILS NFR BLD AUTO: 2.28 10*3/MM3 (ref 1.7–7)
NEUTROPHILS NFR BLD AUTO: 41.1 % (ref 42.7–76)
NITRITE UR QL STRIP: NEGATIVE
NRBC BLD AUTO-RTO: 0 /100 WBC (ref 0–0.2)
NT-PROBNP SERPL-MCNC: <36 PG/ML (ref 0–450)
PH UR STRIP.AUTO: 6.5 [PH] (ref 5–8)
PLATELET # BLD AUTO: 253 10*3/MM3 (ref 140–450)
PMV BLD AUTO: 9.4 FL (ref 6–12)
POTASSIUM SERPL-SCNC: 4.1 MMOL/L (ref 3.5–5.2)
PROT SERPL-MCNC: 7.5 G/DL (ref 6–8.5)
PROT UR QL STRIP: NEGATIVE
RBC # BLD AUTO: 5.07 10*6/MM3 (ref 4.14–5.8)
RBC # UR STRIP: NORMAL /HPF
REF LAB TEST METHOD: NORMAL
RHINOVIRUS RNA SPEC NAA+PROBE: NOT DETECTED
RSV RNA NPH QL NAA+NON-PROBE: NOT DETECTED
SARS-COV-2 RNA NPH QL NAA+NON-PROBE: NOT DETECTED
SODIUM SERPL-SCNC: 139 MMOL/L (ref 136–145)
SP GR UR STRIP: 1.02 (ref 1–1.03)
SQUAMOUS #/AREA URNS HPF: NORMAL /HPF
TRIGL SERPL-MCNC: 190 MG/DL (ref 0–150)
TROPONIN T DELTA: NORMAL
TROPONIN T SERPL HS-MCNC: <6 NG/L
TSH SERPL DL<=0.05 MIU/L-ACNC: 1.09 UIU/ML (ref 0.27–4.2)
UROBILINOGEN UR QL STRIP: ABNORMAL
VLDLC SERPL-MCNC: 34 MG/DL (ref 5–40)
WBC # UR STRIP: NORMAL /HPF
WBC NRBC COR # BLD AUTO: 5.55 10*3/MM3 (ref 3.4–10.8)
WHOLE BLOOD HOLD COAG: NORMAL
WHOLE BLOOD HOLD SPECIMEN: NORMAL

## 2024-08-25 PROCEDURE — 36415 COLL VENOUS BLD VENIPUNCTURE: CPT

## 2024-08-25 PROCEDURE — G0378 HOSPITAL OBSERVATION PER HR: HCPCS

## 2024-08-25 PROCEDURE — 80061 LIPID PANEL: CPT | Performed by: PHYSICIAN ASSISTANT

## 2024-08-25 PROCEDURE — 84484 ASSAY OF TROPONIN QUANT: CPT

## 2024-08-25 PROCEDURE — 93005 ELECTROCARDIOGRAM TRACING: CPT | Performed by: EMERGENCY MEDICINE

## 2024-08-25 PROCEDURE — 0202U NFCT DS 22 TRGT SARS-COV-2: CPT

## 2024-08-25 PROCEDURE — 93005 ELECTROCARDIOGRAM TRACING: CPT

## 2024-08-25 PROCEDURE — 85379 FIBRIN DEGRADATION QUANT: CPT

## 2024-08-25 PROCEDURE — 25010000002 MORPHINE PER 10 MG

## 2024-08-25 PROCEDURE — 83880 ASSAY OF NATRIURETIC PEPTIDE: CPT

## 2024-08-25 PROCEDURE — 83735 ASSAY OF MAGNESIUM: CPT

## 2024-08-25 PROCEDURE — 99285 EMERGENCY DEPT VISIT HI MDM: CPT

## 2024-08-25 PROCEDURE — 25810000003 SODIUM CHLORIDE 0.9 % SOLUTION

## 2024-08-25 PROCEDURE — 80050 GENERAL HEALTH PANEL: CPT

## 2024-08-25 PROCEDURE — 81001 URINALYSIS AUTO W/SCOPE: CPT

## 2024-08-25 PROCEDURE — 71046 X-RAY EXAM CHEST 2 VIEWS: CPT

## 2024-08-25 PROCEDURE — 84439 ASSAY OF FREE THYROXINE: CPT | Performed by: NURSE PRACTITIONER

## 2024-08-25 PROCEDURE — 96374 THER/PROPH/DIAG INJ IV PUSH: CPT

## 2024-08-25 RX ORDER — ROSUVASTATIN CALCIUM 10 MG/1
10 TABLET, COATED ORAL NIGHTLY
Status: DISCONTINUED | OUTPATIENT
Start: 2024-08-26 | End: 2024-08-27 | Stop reason: HOSPADM

## 2024-08-25 RX ORDER — ENOXAPARIN SODIUM 100 MG/ML
40 INJECTION SUBCUTANEOUS DAILY
Status: DISCONTINUED | OUTPATIENT
Start: 2024-08-25 | End: 2024-08-27 | Stop reason: HOSPADM

## 2024-08-25 RX ORDER — SODIUM CHLORIDE 0.9 % (FLUSH) 0.9 %
10 SYRINGE (ML) INJECTION AS NEEDED
Status: DISCONTINUED | OUTPATIENT
Start: 2024-08-25 | End: 2024-08-27 | Stop reason: HOSPADM

## 2024-08-25 RX ORDER — AMOXICILLIN 250 MG
2 CAPSULE ORAL 2 TIMES DAILY PRN
Status: DISCONTINUED | OUTPATIENT
Start: 2024-08-25 | End: 2024-08-27 | Stop reason: HOSPADM

## 2024-08-25 RX ORDER — SODIUM CHLORIDE 0.9 % (FLUSH) 0.9 %
10 SYRINGE (ML) INJECTION EVERY 12 HOURS SCHEDULED
Status: DISCONTINUED | OUTPATIENT
Start: 2024-08-25 | End: 2024-08-27 | Stop reason: HOSPADM

## 2024-08-25 RX ORDER — BISACODYL 5 MG/1
5 TABLET, DELAYED RELEASE ORAL DAILY PRN
Status: DISCONTINUED | OUTPATIENT
Start: 2024-08-25 | End: 2024-08-27 | Stop reason: HOSPADM

## 2024-08-25 RX ORDER — NITROGLYCERIN 0.4 MG/1
0.4 TABLET SUBLINGUAL
Status: DISCONTINUED | OUTPATIENT
Start: 2024-08-25 | End: 2024-08-27 | Stop reason: HOSPADM

## 2024-08-25 RX ORDER — POLYETHYLENE GLYCOL 3350 17 G/17G
17 POWDER, FOR SOLUTION ORAL DAILY PRN
Status: DISCONTINUED | OUTPATIENT
Start: 2024-08-25 | End: 2024-08-27 | Stop reason: HOSPADM

## 2024-08-25 RX ORDER — ASPIRIN 81 MG/1
324 TABLET, CHEWABLE ORAL ONCE
Status: COMPLETED | OUTPATIENT
Start: 2024-08-25 | End: 2024-08-25

## 2024-08-25 RX ORDER — ONDANSETRON 2 MG/ML
4 INJECTION INTRAMUSCULAR; INTRAVENOUS EVERY 6 HOURS PRN
Status: DISCONTINUED | OUTPATIENT
Start: 2024-08-25 | End: 2024-08-27 | Stop reason: SDUPTHER

## 2024-08-25 RX ORDER — ROSUVASTATIN CALCIUM 10 MG/1
10 TABLET, COATED ORAL ONCE
Status: COMPLETED | OUTPATIENT
Start: 2024-08-25 | End: 2024-08-25

## 2024-08-25 RX ORDER — ONDANSETRON 4 MG/1
4 TABLET, ORALLY DISINTEGRATING ORAL EVERY 6 HOURS PRN
Status: DISCONTINUED | OUTPATIENT
Start: 2024-08-25 | End: 2024-08-27 | Stop reason: SDUPTHER

## 2024-08-25 RX ORDER — HYDROCODONE BITARTRATE AND ACETAMINOPHEN 5; 325 MG/1; MG/1
1 TABLET ORAL EVERY 6 HOURS PRN
Status: DISCONTINUED | OUTPATIENT
Start: 2024-08-25 | End: 2024-08-27 | Stop reason: HOSPADM

## 2024-08-25 RX ORDER — SODIUM CHLORIDE 9 MG/ML
40 INJECTION, SOLUTION INTRAVENOUS AS NEEDED
Status: DISCONTINUED | OUTPATIENT
Start: 2024-08-25 | End: 2024-08-27 | Stop reason: HOSPADM

## 2024-08-25 RX ORDER — ALUMINA, MAGNESIA, AND SIMETHICONE 2400; 2400; 240 MG/30ML; MG/30ML; MG/30ML
15 SUSPENSION ORAL EVERY 6 HOURS PRN
Status: DISCONTINUED | OUTPATIENT
Start: 2024-08-25 | End: 2024-08-27 | Stop reason: HOSPADM

## 2024-08-25 RX ORDER — HYDRALAZINE HYDROCHLORIDE 20 MG/ML
10 INJECTION INTRAMUSCULAR; INTRAVENOUS EVERY 6 HOURS PRN
Status: DISCONTINUED | OUTPATIENT
Start: 2024-08-25 | End: 2024-08-27 | Stop reason: HOSPADM

## 2024-08-25 RX ORDER — MORPHINE SULFATE 2 MG/ML
2 INJECTION, SOLUTION INTRAMUSCULAR; INTRAVENOUS ONCE
Status: COMPLETED | OUTPATIENT
Start: 2024-08-25 | End: 2024-08-25

## 2024-08-25 RX ORDER — BISACODYL 10 MG
10 SUPPOSITORY, RECTAL RECTAL DAILY PRN
Status: DISCONTINUED | OUTPATIENT
Start: 2024-08-25 | End: 2024-08-27 | Stop reason: HOSPADM

## 2024-08-25 RX ORDER — ASPIRIN 81 MG/1
81 TABLET, CHEWABLE ORAL DAILY
Status: DISCONTINUED | OUTPATIENT
Start: 2024-08-26 | End: 2024-08-27 | Stop reason: HOSPADM

## 2024-08-25 RX ADMIN — ASPIRIN 81 MG CHEWABLE TABLET 324 MG: 81 TABLET CHEWABLE at 17:48

## 2024-08-25 RX ADMIN — HYDROCODONE BITARTRATE AND ACETAMINOPHEN 1 TABLET: 5; 325 TABLET ORAL at 22:47

## 2024-08-25 RX ADMIN — Medication 10 ML: at 20:13

## 2024-08-25 RX ADMIN — SODIUM CHLORIDE 1000 ML: 9 INJECTION, SOLUTION INTRAVENOUS at 14:42

## 2024-08-25 RX ADMIN — MORPHINE SULFATE 2 MG: 2 INJECTION, SOLUTION INTRAMUSCULAR; INTRAVENOUS at 20:12

## 2024-08-25 RX ADMIN — NITROGLYCERIN 0.4 MG: 0.4 TABLET SUBLINGUAL at 18:21

## 2024-08-25 RX ADMIN — NITROGLYCERIN 0.4 MG: 0.4 TABLET SUBLINGUAL at 18:43

## 2024-08-25 RX ADMIN — ROSUVASTATIN 10 MG: 10 TABLET, FILM COATED ORAL at 22:47

## 2024-08-25 NOTE — LETTER
August 27, 2024     Patient: Caleb Vargas   YOB: 1985   Date of Visit: 8/25/2024       To Whom It May Concern:    It is my medical opinion that Caleb Vargas may return to work on 09/02/24 .           Sincerely,

## 2024-08-25 NOTE — PLAN OF CARE
Goal Outcome Evaluation:  Plan of Care Reviewed With: patient        Progress: improving  Outcome Evaluation: Patient states that he has some tenderness in his chest, 7/10. No SOA reported. Up adlib. Patient had 18g LAC that went bad and was pulled. Patient has diet order in and will call cafeteria soon. Spouse to be in later.

## 2024-08-25 NOTE — ED PROVIDER NOTES
Subjective   History of Present Illness  39-year-old male presents the ED with complaints of heart palpitations, left anterior chest pain that intermittently radiates down the left arm, fatigue, dizziness when standing, intermittent pain that radiates down the left leg has been ongoing for the last 3 days. States he has been trying to stop smoking and has been smoking less than half a pack per day.  Reports he only medication he takes daily is rosuvastatin, past medical history of hypertension.  Does have sleep apnea and uses his CPAP machine every night.  Denies abdominal pain, nausea, vomiting, shortness of breath, numbness, tingling, weakness, changes in vision, fever, trauma to the back or back pain.  Does report that he had a head injury 8/14/2024 while at work and was seen here for that with a normal head CT    PCP: Yashira        Review of Systems   Constitutional:  Positive for fatigue. Negative for fever.   Respiratory:  Positive for cough (Chronic cough). Negative for shortness of breath.    Cardiovascular:  Positive for chest pain.   Gastrointestinal:  Negative for abdominal pain and vomiting.   Musculoskeletal:  Negative for back pain.   Neurological:  Positive for dizziness.       Past Medical History:   Diagnosis Date    Abdominal pain     Agitation     Allergies     Apnea     Change in weight     Chest congestion     Choking     Cough     GERD (gastroesophageal reflux disease)     High blood pressure     Obesity     Sleep disturbance     Snoring     Wheezing        Allergies   Allergen Reactions    Albuterol Sulfate Shortness Of Breath    Oseltamivir Nausea And Vomiting, GI Intolerance and Unknown (See Comments)    Atorvastatin Unknown - Low Severity    Latex Hives    Latex Rash    Tamiflu [Oseltamivir Phosphate] Nausea And Vomiting       Past Surgical History:   Procedure Laterality Date    CLEFT PALATE REPAIR      FOOT ARTHROPLASTY Left 2020    SINUS SURGERY      TONSILLECTOMY         Family History    Problem Relation Age of Onset    No Known Problems Mother     No Known Problems Father     No Known Problems Brother        Social History     Socioeconomic History    Marital status: Single   Tobacco Use    Smoking status: Every Day     Current packs/day: 0.50     Types: Cigarettes     Passive exposure: Current    Smokeless tobacco: Never   Vaping Use    Vaping status: Never Used   Substance and Sexual Activity    Alcohol use: Yes     Comment: 5 beers every other week    Drug use: Never    Sexual activity: Defer           Objective   Physical Exam  Vitals reviewed.   Constitutional:       General: He is not in acute distress.     Appearance: He is not ill-appearing, toxic-appearing or diaphoretic.   HENT:      Head: Normocephalic and atraumatic.   Eyes:      Extraocular Movements: Extraocular movements intact.      Conjunctiva/sclera: Conjunctivae normal.      Pupils: Pupils are equal, round, and reactive to light.   Cardiovascular:      Rate and Rhythm: Normal rate and regular rhythm.      Pulses: Normal pulses.      Heart sounds: Normal heart sounds.   Pulmonary:      Effort: Pulmonary effort is normal.      Breath sounds: Normal breath sounds. No wheezing.   Abdominal:      General: Bowel sounds are normal.      Palpations: Abdomen is soft.      Tenderness: There is no abdominal tenderness.   Musculoskeletal:         General: Normal range of motion.   Skin:     General: Skin is warm and dry.      Capillary Refill: Capillary refill takes less than 2 seconds.   Neurological:      General: No focal deficit present.      Mental Status: He is alert and oriented to person, place, and time.   Psychiatric:         Mood and Affect: Mood normal.         Behavior: Behavior normal.         Thought Content: Thought content normal.         Judgment: Judgment normal.         Procedures        EKG independently interpreted by Dr. Francisco and reviewed by myself as sinus rhythm at a rate of 69 as compared to previous from  "2/4/2024 though sinus tachycardia at a rate of 106           ED Course  ED Course as of 08/25/24 2235   Sun Aug 25, 2024   1517 Spoke to Gaston Das with Obs who agrees with OBS placement [KB]      ED Course User Index  [KB] Mp Lechuga, ELIEL      /79 (BP Location: Right arm, Patient Position: Lying)   Pulse 72   Temp 97.7 °F (36.5 °C) (Oral)   Resp 18   Ht 180.3 cm (71\")   Wt 126 kg (277 lb 9 oz)   SpO2 97%   BMI 38.71 kg/m²   Labs Reviewed   URINALYSIS W/ MICROSCOPIC IF INDICATED (NO CULTURE) - Abnormal; Notable for the following components:       Result Value    Ketones, UA Trace (*)     Leuk Esterase, UA Trace (*)     All other components within normal limits   COMPREHENSIVE METABOLIC PANEL - Abnormal; Notable for the following components:    Glucose 101 (*)     ALT (SGPT) 60 (*)     All other components within normal limits    Narrative:     GFR Normal >60  Chronic Kidney Disease <60  Kidney Failure <15     CBC WITH AUTO DIFFERENTIAL - Abnormal; Notable for the following components:    Neutrophil % 41.1 (*)     All other components within normal limits   LIPID PANEL - Abnormal; Notable for the following components:    Triglycerides 190 (*)     HDL Cholesterol 33 (*)     LDL Cholesterol  112 (*)     All other components within normal limits    Narrative:     Cholesterol Reference Ranges  (U.S. Department of Health and Human Services ATP III Classifications)    Desirable          <200 mg/dL  Borderline High    200-239 mg/dL  High Risk          >240 mg/dL      Triglyceride Reference Ranges  (U.S. Department of Health and Human Services ATP III Classifications)    Normal           <150 mg/dL  Borderline High  150-199 mg/dL  High             200-499 mg/dL  Very High        >500 mg/dL    HDL Reference Ranges  (U.S. Department of Health and Human Services ATP III Classifications)    Low     <40 mg/dl (major risk factor for CHD)  High    >60 mg/dl ('negative' risk factor for CHD)        LDL Reference " Ranges  (U.S. Department of Health and Human Services ATP III Classifications)    Optimal          <100 mg/dL  Near Optimal     100-129 mg/dL  Borderline High  130-159 mg/dL  High             160-189 mg/dL  Very High        >189 mg/dL   RESPIRATORY PANEL PCR W/ COVID-19 (SARS-COV-2), NP SWAB IN UTM/VTP, 2 HR TAT - Normal    Narrative:     In the setting of a positive respiratory panel with a viral infection PLUS a negative procalcitonin without other underlying concern for bacterial infection, consider observing off antibiotics or discontinuation of antibiotics and continue supportive care. If the respiratory panel is positive for atypical bacterial infection (Bordetella pertussis, Chlamydophila pneumoniae, or Mycoplasma pneumoniae), consider antibiotic de-escalation to target atypical bacterial infection.   BNP (IN-HOUSE) - Normal    Narrative:     This assay is used as an aid in the diagnosis of individuals suspected of having heart failure. It can be used as an aid in the diagnosis of acute decompensated heart failure (ADHF) in patients presenting with signs and symptoms of ADHF to the emergency department (ED). In addition, NT-proBNP of <300 pg/mL indicates ADHF is not likely.    Age Range Result Interpretation  NT-proBNP Concentration (pg/mL:      <50             Positive            >450                   Gray                 300-450                    Negative             <300    50-75           Positive            >900                  Gray                300-900                  Negative            <300      >75             Positive            >1800                  Gray                300-1800                  Negative            <300   TROPONIN - Normal    Narrative:     High Sensitive Troponin T Reference Range:  <14.0 ng/L- Negative Female for AMI  <22.0 ng/L- Negative Male for AMI  >=14 - Abnormal Female indicating possible myocardial injury.  >=22 - Abnormal Male indicating possible myocardial injury.  "  Clinicians would have to utilize clinical acumen, EKG, Troponin, and serial changes to determine if it is an Acute Myocardial Infarction or myocardial injury due to an underlying chronic condition.        D-DIMER, QUANTITATIVE - Normal    Narrative:     According to the assay 's published package insert, a normal (<0.50 mg/L (FEU)) D-dimer result in conjunction with a non-high clinical probability assessment, excludes deep vein thrombosis (DVT) and pulmonary embolism (PE) with high sensitivity.    D-dimer values increase with age and this can make VTE exclusion of an older population difficult. To address this, the [employer], based on best available evidence and recent guidelines, recommends that clinicians use age-adjusted D-dimer thresholds in patients greater than 50 years of age with: a) a low probability of PE who do not meet all Pulmonary Embolism Rule Out Criteria, or b) in those with intermediate probability of PE.   The formula for an age-adjusted D-dimer cut-off is \"age/100\".  For example, a 60 year old patient would have an age-adjusted cut-off of 0.60 mg/L (FEU) and an 80 year old 0.80 mg/L (FEU).   TSH - Normal   MAGNESIUM - Normal   RAINBOW DRAW    Narrative:     The following orders were created for panel order [place] Draw.  Procedure                               Abnormality         Status                     ---------                               -----------         ------                     Green Top (Gel)[[id number]]                                  Final result               Lavender Top[[id number]]                                     Final result               Gold Top - SST[[id number]]                                   Final result               Light Blue Top[[id number]]                                   Final result                 Please view results for these tests on the individual orders.   HIGH SENSITIVITIY TROPONIN T 2HR    Narrative:     High Sensitive " Troponin T Reference Range:  <14.0 ng/L- Negative Female for AMI  <22.0 ng/L- Negative Male for AMI  >=14 - Abnormal Female indicating possible myocardial injury.  >=22 - Abnormal Male indicating possible myocardial injury.   Clinicians would have to utilize clinical acumen, EKG, Troponin, and serial changes to determine if it is an Acute Myocardial Infarction or myocardial injury due to an underlying chronic condition.        URINALYSIS, MICROSCOPIC ONLY   BASIC METABOLIC PANEL   MAGNESIUM   CBC WITH AUTO DIFFERENTIAL   GREEN TOP   LAVENDER TOP   GOLD TOP - SST   LIGHT BLUE TOP   CBC AND DIFFERENTIAL    Narrative:     The following orders were created for panel order CBC & Differential.  Procedure                               Abnormality         Status                     ---------                               -----------         ------                     CBC Auto Differential[[id number]]        Abnormal            Final result                 Please view results for these tests on the individual orders.   CBC AND DIFFERENTIAL    Narrative:     The following orders were created for panel order CBC & Differential.  Procedure                               Abnormality         Status                     ---------                               -----------         ------                     CBC Auto Differential[[id number]]                                                         Please view results for these tests on the individual orders.     Medications   sodium chloride 0.9 % flush 10 mL (has no administration in time range)   sodium chloride 0.9 % flush 10 mL (has no administration in time range)   sodium chloride 0.9 % flush 10 mL (10 mL Intravenous Given 8/25/24 2013)   sodium chloride 0.9 % flush 10 mL (has no administration in time range)   sodium chloride 0.9 % infusion 40 mL (has no administration in time range)   aluminum-magnesium hydroxide-simethicone (MAALOX MAX) 400-400-40 MG/5ML suspension 15 mL (has no  administration in time range)   ondansetron ODT (ZOFRAN-ODT) disintegrating tablet 4 mg (has no administration in time range)     Or   ondansetron (ZOFRAN) injection 4 mg (has no administration in time range)   melatonin tablet 5 mg (has no administration in time range)   Enoxaparin Sodium (LOVENOX) syringe 40 mg (40 mg Subcutaneous Not Given 8/25/24 1741)   nitroglycerin (NITROSTAT) SL tablet 0.4 mg (0.4 mg Sublingual Given 8/25/24 1843)   Potassium Replacement - Follow Nurse / BPA Driven Protocol (has no administration in time range)   Magnesium Standard Dose Replacement - Follow Nurse / BPA Driven Protocol (has no administration in time range)   Phosphorus Replacement - Follow Nurse / BPA Driven Protocol (has no administration in time range)   Calcium Replacement - Follow Nurse / BPA Driven Protocol (has no administration in time range)   sennosides-docusate (PERICOLACE) 8.6-50 MG per tablet 2 tablet (has no administration in time range)     And   polyethylene glycol (MIRALAX) packet 17 g (has no administration in time range)     And   bisacodyl (DULCOLAX) EC tablet 5 mg (has no administration in time range)     And   bisacodyl (DULCOLAX) suppository 10 mg (has no administration in time range)   aspirin chewable tablet 81 mg (has no administration in time range)   HYDROcodone-acetaminophen (NORCO) 5-325 MG per tablet 1 tablet (has no administration in time range)   rosuvastatin (CRESTOR) tablet 10 mg (has no administration in time range)   sodium chloride 0.9 % bolus 1,000 mL (0 mL Intravenous Stopped 8/25/24 1512)   aspirin chewable tablet 324 mg (324 mg Oral Given 8/25/24 1748)   morphine injection 2 mg (2 mg Intravenous Given 8/25/24 2012)     XR Chest 2 View    Result Date: 8/25/2024  Impression: No acute intrathoracic finding. Electronically Signed: Brendan Maldonado  8/25/2024 12:54 PM EDT  Workstation ID: [id number]                                          Medical Decision Making  Echo from 11/2020:  1.   Stress Cardiolite with normal perfusion, negative for ischemia.  2.  Normal wall motion LVEF of 73%.    Patient was seen for the above complaints.  Patient does have a past medical history of hypertension, sleep apnea, hyperlipidemia.  Considered head CT however patient had a head CT on 8/14/2024 that shows no acute findings.  Patient had no focal deficits on assessment.  Did not have orthostatic hypotension with orthostatic vital signs.  IV was established and blood work was obtained to assess for electrolyte abnormalities, infection, cardiac enzymes.  Serial troponins negative, TSH within normal limits of 1.09, magnesium 2.2, white blood cell count 5.55, hemoglobin 15.5, D-dimer negative, BNP negative, electrolytes fairly within normal limits.  UA was obtained to assess for dehydration, this does show trace ketones.  Respiratory panel negative.  Chest x-ray obtained and independently interpreted by the radiologist as no acute intrathoracic finding.  Due to the intermittent left anterior chest pain that radiates down the left arm with no recent stress test, patient will be placed in observation unit for stress test in the morning.  Patient was given 1 L of normal saline along with 324 mg aspirin and on reassessment states he has moderate relief along with decreased dizziness.  Discussed case with Gaston BUSCH who agrees with observation placement.  Discussed plan of care and disposition with patient and wife at bedside who verbalized understanding were agreeable at this time.    Problems Addressed:  Chest pain, unspecified type: complicated acute illness or injury  Heart palpitations: complicated acute illness or injury    Amount and/or Complexity of Data Reviewed  Labs: ordered. Decision-making details documented in ED Course.  Radiology: ordered and independent interpretation performed. Decision-making details documented in ED Course.  ECG/medicine tests: ordered and independent interpretation performed.  Decision-making details documented in ED Course.    Risk  OTC drugs.  Prescription drug management.  Decision regarding hospitalization.        Final diagnoses:   Heart palpitations   Chest pain, unspecified type       ED Disposition  ED Disposition       ED Disposition   Decision to Admit    Condition   --    Comment   --               No follow-up provider specified.       Medication List        ASK your doctor about these medications      rosuvastatin 10 MG tablet  Commonly known as: CRESTOR  Ask about: Which instructions should I use?                 Mp Lechuga, APRN  08/25/24 1574

## 2024-08-25 NOTE — PLAN OF CARE
Problem: Adult Inpatient Plan of Care  Goal: Plan of Care Review  8/25/2024 1636 by Lorri Dinero RN  Outcome: Ongoing, Progressing  8/25/2024 1635 by Lorri Dinero RN  Outcome: Ongoing, Progressing  Flowsheets (Taken 8/25/2024 1635)  Progress: improving  Plan of Care Reviewed With: patient  Outcome Evaluation: Patient states that he has some tenderness in his chest, 7/10. No SOA reported. Up adlib. Patient had 18g LAC that went bad and was pulled. Patient has diet order in and will call [facility] soon. Spouse to be in later.  Goal: Patient-Specific Goal (Individualized)  8/25/2024 1636 by Lorri Dinero RN  Outcome: Ongoing, Progressing  8/25/2024 1635 by Lorri Dinero RN  Outcome: Ongoing, Progressing  Goal: Absence of Hospital-Acquired Illness or Injury  8/25/2024 1636 by Lorri Dinero RN  Outcome: Ongoing, Progressing  8/25/2024 1635 by Lorri Dinero RN  Outcome: Ongoing, Progressing  Goal: Optimal Comfort and Wellbeing  8/25/2024 1636 by Lorri Dinero RN  Outcome: Ongoing, Progressing  8/25/2024 1635 by Lorri Dinero RN  Outcome: Ongoing, Progressing  Goal: Readiness for Transition of Care  8/25/2024 1636 by Lorri Dinero RN  Outcome: Ongoing, Progressing  8/25/2024 1635 by Lorri Dinero RN  Outcome: Ongoing, Progressing  Intervention: Mutually Develop Transition Plan  Recent Flowsheet Documentation  Taken 8/25/2024 1628 by Lorri Dinero RN  Equipment Currently Used at Home: none  Transportation Anticipated: family or friend will provide  Patient/Family Anticipated Services at Transition: none  Patient/Family Anticipates Transition to: home with family   Goal Outcome Evaluation:  Plan of Care Reviewed With: patient

## 2024-08-25 NOTE — Clinical Note
A 5 fr sheath was successfully inserted into the right femoral artery. Sheath insertion not delayed.

## 2024-08-26 ENCOUNTER — APPOINTMENT (OUTPATIENT)
Dept: CARDIOLOGY | Facility: HOSPITAL | Age: 39
End: 2024-08-26
Payer: COMMERCIAL

## 2024-08-26 ENCOUNTER — APPOINTMENT (OUTPATIENT)
Dept: NUCLEAR MEDICINE | Facility: HOSPITAL | Age: 39
End: 2024-08-26
Payer: COMMERCIAL

## 2024-08-26 PROBLEM — R07.89 CHEST DISCOMFORT: Status: ACTIVE | Noted: 2024-08-25

## 2024-08-26 PROBLEM — R94.39 ABNORMAL NUCLEAR STRESS TEST: Status: ACTIVE | Noted: 2024-08-25

## 2024-08-26 LAB
ALBUMIN SERPL-MCNC: 4.1 G/DL (ref 3.5–5.2)
ALP SERPL-CCNC: 74 U/L (ref 39–117)
ALT SERPL W P-5'-P-CCNC: 48 U/L (ref 1–41)
ANION GAP SERPL CALCULATED.3IONS-SCNC: 7.9 MMOL/L (ref 5–15)
AST SERPL-CCNC: 24 U/L (ref 1–40)
BASOPHILS # BLD AUTO: 0.04 10*3/MM3 (ref 0–0.2)
BASOPHILS NFR BLD AUTO: 0.5 % (ref 0–1.5)
BH CV ECHO MEAS - ACS: 2.4 CM
BH CV ECHO MEAS - AO MAX PG: 10.6 MMHG
BH CV ECHO MEAS - AO MEAN PG: 6 MMHG
BH CV ECHO MEAS - AO ROOT DIAM: 3.7 CM
BH CV ECHO MEAS - AO V2 MAX: 163 CM/SEC
BH CV ECHO MEAS - AO V2 VTI: 29.6 CM
BH CV ECHO MEAS - AVA(I,D): 1.84 CM2
BH CV ECHO MEAS - EDV(CUBED): 132.7 ML
BH CV ECHO MEAS - EDV(MOD-SP4): 123 ML
BH CV ECHO MEAS - EF(MOD-BP): 58 %
BH CV ECHO MEAS - EF(MOD-SP4): 57.8 %
BH CV ECHO MEAS - ESV(CUBED): 35.9 ML
BH CV ECHO MEAS - ESV(MOD-SP4): 51.9 ML
BH CV ECHO MEAS - FS: 35.3 %
BH CV ECHO MEAS - IVS/LVPW: 0.92 CM
BH CV ECHO MEAS - IVSD: 1.1 CM
BH CV ECHO MEAS - LA DIMENSION: 4.1 CM
BH CV ECHO MEAS - LAT PEAK E' VEL: 12.6 CM/SEC
BH CV ECHO MEAS - LV DIASTOLIC VOL/BSA (35-75): 50.8 CM2
BH CV ECHO MEAS - LV MASS(C)D: 227.4 GRAMS
BH CV ECHO MEAS - LV MAX PG: 2.5 MMHG
BH CV ECHO MEAS - LV MEAN PG: 1 MMHG
BH CV ECHO MEAS - LV SYSTOLIC VOL/BSA (12-30): 21.4 CM2
BH CV ECHO MEAS - LV V1 MAX: 79.2 CM/SEC
BH CV ECHO MEAS - LV V1 VTI: 15.7 CM
BH CV ECHO MEAS - LVIDD: 5.1 CM
BH CV ECHO MEAS - LVIDS: 3.3 CM
BH CV ECHO MEAS - LVOT AREA: 3.5 CM2
BH CV ECHO MEAS - LVOT DIAM: 2.1 CM
BH CV ECHO MEAS - LVPWD: 1.2 CM
BH CV ECHO MEAS - MED PEAK E' VEL: 9 CM/SEC
BH CV ECHO MEAS - MR MAX PG: 103.2 MMHG
BH CV ECHO MEAS - MR MAX VEL: 508 CM/SEC
BH CV ECHO MEAS - MV A MAX VEL: 70.2 CM/SEC
BH CV ECHO MEAS - MV DEC SLOPE: 534 CM/SEC2
BH CV ECHO MEAS - MV DEC TIME: 0.14 SEC
BH CV ECHO MEAS - MV E MAX VEL: 122 CM/SEC
BH CV ECHO MEAS - MV E/A: 1.74
BH CV ECHO MEAS - MV MAX PG: 4.9 MMHG
BH CV ECHO MEAS - MV MEAN PG: 2 MMHG
BH CV ECHO MEAS - MV P1/2T: 62 MSEC
BH CV ECHO MEAS - MV V2 VTI: 31.9 CM
BH CV ECHO MEAS - MVA(P1/2T): 3.5 CM2
BH CV ECHO MEAS - MVA(VTI): 1.7 CM2
BH CV ECHO MEAS - PA V2 MAX: 129 CM/SEC
BH CV ECHO MEAS - PI END-D VEL: 85.6 CM/SEC
BH CV ECHO MEAS - RAP SYSTOLE: 8 MMHG
BH CV ECHO MEAS - RV MAX PG: 1.14 MMHG
BH CV ECHO MEAS - RV V1 MAX: 53.5 CM/SEC
BH CV ECHO MEAS - RV V1 VTI: 9.5 CM
BH CV ECHO MEAS - RVSP: 37.8 MMHG
BH CV ECHO MEAS - SV(LVOT): 54.4 ML
BH CV ECHO MEAS - SV(MOD-SP4): 71.1 ML
BH CV ECHO MEAS - SVI(LVOT): 22.5 ML/M2
BH CV ECHO MEAS - SVI(MOD-SP4): 29.4 ML/M2
BH CV ECHO MEAS - TAPSE (>1.6): 2.03 CM
BH CV ECHO MEAS - TR MAX PG: 29.8 MMHG
BH CV ECHO MEAS - TR MAX VEL: 273 CM/SEC
BH CV ECHO MEASUREMENTS AVERAGE E/E' RATIO: 11.3
BH CV NUCLEAR PRIOR STUDY: 3
BH CV REST NUCLEAR ISOTOPE DOSE: 11 MCI
BH CV STRESS BP STAGE 1: NORMAL
BH CV STRESS BP STAGE 2: NORMAL
BH CV STRESS BP STAGE 3: NORMAL
BH CV STRESS DURATION MIN STAGE 1: 3
BH CV STRESS DURATION MIN STAGE 2: 3
BH CV STRESS DURATION MIN STAGE 3: 3
BH CV STRESS DURATION SEC STAGE 1: 0
BH CV STRESS DURATION SEC STAGE 2: 0
BH CV STRESS DURATION SEC STAGE 3: 0
BH CV STRESS GRADE STAGE 1: 10
BH CV STRESS GRADE STAGE 2: 12
BH CV STRESS GRADE STAGE 3: 14
BH CV STRESS HR STAGE 1: 115
BH CV STRESS HR STAGE 2: 136
BH CV STRESS HR STAGE 3: 154
BH CV STRESS METS STAGE 1: 5
BH CV STRESS METS STAGE 2: 7.5
BH CV STRESS METS STAGE 3: 10
BH CV STRESS NUCLEAR ISOTOPE DOSE: 32 MCI
BH CV STRESS PROTOCOL 1: NORMAL
BH CV STRESS RECOVERY BP: NORMAL MMHG
BH CV STRESS RECOVERY HR: 156 BPM
BH CV STRESS SPEED STAGE 1: 1.7
BH CV STRESS SPEED STAGE 2: 2.5
BH CV STRESS SPEED STAGE 3: 3.4
BH CV STRESS STAGE 1: 1
BH CV STRESS STAGE 2: 2
BH CV STRESS STAGE 3: 3
BH CV XLRA - TDI S': 11.7 CM/SEC
BILIRUB CONJ SERPL-MCNC: 0.1 MG/DL (ref 0–0.3)
BILIRUB INDIRECT SERPL-MCNC: 0.1 MG/DL
BILIRUB SERPL-MCNC: 0.2 MG/DL (ref 0–1.2)
BUN SERPL-MCNC: 15 MG/DL (ref 6–20)
BUN/CREAT SERPL: 17.9 (ref 7–25)
CALCIUM SPEC-SCNC: 8.8 MG/DL (ref 8.6–10.5)
CHLORIDE SERPL-SCNC: 105 MMOL/L (ref 98–107)
CO2 SERPL-SCNC: 26.1 MMOL/L (ref 22–29)
CREAT SERPL-MCNC: 0.84 MG/DL (ref 0.76–1.27)
DEPRECATED RDW RBC AUTO: 43.3 FL (ref 37–54)
EGFRCR SERPLBLD CKD-EPI 2021: 112.4 ML/MIN/1.73
EOSINOPHIL # BLD AUTO: 0.35 10*3/MM3 (ref 0–0.4)
EOSINOPHIL NFR BLD AUTO: 4.5 % (ref 0.3–6.2)
ERYTHROCYTE [DISTWIDTH] IN BLOOD BY AUTOMATED COUNT: 12.4 % (ref 12.3–15.4)
GLUCOSE SERPL-MCNC: 85 MG/DL (ref 65–99)
HBA1C MFR BLD: 6.08 % (ref 4.8–5.6)
HCT VFR BLD AUTO: 42.3 % (ref 37.5–51)
HGB BLD-MCNC: 13.6 G/DL (ref 13–17.7)
IMM GRANULOCYTES # BLD AUTO: 0.01 10*3/MM3 (ref 0–0.05)
IMM GRANULOCYTES NFR BLD AUTO: 0.1 % (ref 0–0.5)
LEFT ATRIUM VOLUME INDEX: 24.3 ML/M2
LYMPHOCYTES # BLD AUTO: 3.88 10*3/MM3 (ref 0.7–3.1)
LYMPHOCYTES NFR BLD AUTO: 49.8 % (ref 19.6–45.3)
MAGNESIUM SERPL-MCNC: 2.3 MG/DL (ref 1.6–2.6)
MAXIMAL PREDICTED HEART RATE: 179 BPM
MCH RBC QN AUTO: 30.3 PG (ref 26.6–33)
MCHC RBC AUTO-ENTMCNC: 32.2 G/DL (ref 31.5–35.7)
MCV RBC AUTO: 94.2 FL (ref 79–97)
MONOCYTES # BLD AUTO: 0.66 10*3/MM3 (ref 0.1–0.9)
MONOCYTES NFR BLD AUTO: 8.5 % (ref 5–12)
NEUTROPHILS NFR BLD AUTO: 2.85 10*3/MM3 (ref 1.7–7)
NEUTROPHILS NFR BLD AUTO: 36.6 % (ref 42.7–76)
NRBC BLD AUTO-RTO: 0 /100 WBC (ref 0–0.2)
PERCENT MAX PREDICTED HR: 86.03 %
PLATELET # BLD AUTO: 225 10*3/MM3 (ref 140–450)
PMV BLD AUTO: 9.4 FL (ref 6–12)
POTASSIUM SERPL-SCNC: 4.2 MMOL/L (ref 3.5–5.2)
PROT SERPL-MCNC: 6.4 G/DL (ref 6–8.5)
QT INTERVAL: 351 MS
QTC INTERVAL: 377 MS
RBC # BLD AUTO: 4.49 10*6/MM3 (ref 4.14–5.8)
SINUS: 3.3 CM
SODIUM SERPL-SCNC: 139 MMOL/L (ref 136–145)
STJ: 2.6 CM
STRESS BASELINE BP: NORMAL MMHG
STRESS BASELINE HR: 90 BPM
STRESS PERCENT HR: 101 %
STRESS POST ESTIMATED WORKLOAD: 9.1 METS
STRESS POST EXERCISE DUR MIN: 7 MIN
STRESS POST EXERCISE DUR SEC: 21 SEC
STRESS POST PEAK BP: NORMAL MMHG
STRESS POST PEAK HR: 154 BPM
STRESS TARGET HR: 152 BPM
T4 FREE SERPL-MCNC: 0.99 NG/DL (ref 0.93–1.7)
WBC NRBC COR # BLD AUTO: 7.79 10*3/MM3 (ref 3.4–10.8)

## 2024-08-26 PROCEDURE — 83735 ASSAY OF MAGNESIUM: CPT | Performed by: PHYSICIAN ASSISTANT

## 2024-08-26 PROCEDURE — A9502 TC99M TETROFOSMIN: HCPCS | Performed by: EMERGENCY MEDICINE

## 2024-08-26 PROCEDURE — 99204 OFFICE O/P NEW MOD 45 MIN: CPT | Performed by: INTERNAL MEDICINE

## 2024-08-26 PROCEDURE — 78452 HT MUSCLE IMAGE SPECT MULT: CPT

## 2024-08-26 PROCEDURE — 78452 HT MUSCLE IMAGE SPECT MULT: CPT | Performed by: INTERNAL MEDICINE

## 2024-08-26 PROCEDURE — G0378 HOSPITAL OBSERVATION PER HR: HCPCS

## 2024-08-26 PROCEDURE — 83036 HEMOGLOBIN GLYCOSYLATED A1C: CPT | Performed by: NURSE PRACTITIONER

## 2024-08-26 PROCEDURE — 93306 TTE W/DOPPLER COMPLETE: CPT | Performed by: INTERNAL MEDICINE

## 2024-08-26 PROCEDURE — 93018 CV STRESS TEST I&R ONLY: CPT | Performed by: INTERNAL MEDICINE

## 2024-08-26 PROCEDURE — 25010000002 HYDRALAZINE PER 20 MG: Performed by: NURSE PRACTITIONER

## 2024-08-26 PROCEDURE — 80048 BASIC METABOLIC PNL TOTAL CA: CPT | Performed by: PHYSICIAN ASSISTANT

## 2024-08-26 PROCEDURE — 93017 CV STRESS TEST TRACING ONLY: CPT

## 2024-08-26 PROCEDURE — 96375 TX/PRO/DX INJ NEW DRUG ADDON: CPT

## 2024-08-26 PROCEDURE — 85025 COMPLETE CBC W/AUTO DIFF WBC: CPT | Performed by: PHYSICIAN ASSISTANT

## 2024-08-26 PROCEDURE — 0 TECHNETIUM TETROFOSMIN KIT: Performed by: EMERGENCY MEDICINE

## 2024-08-26 PROCEDURE — 80076 HEPATIC FUNCTION PANEL: CPT | Performed by: NURSE PRACTITIONER

## 2024-08-26 PROCEDURE — 93306 TTE W/DOPPLER COMPLETE: CPT

## 2024-08-26 RX ORDER — SODIUM CHLORIDE 9 MG/ML
40 INJECTION, SOLUTION INTRAVENOUS AS NEEDED
Status: DISCONTINUED | OUTPATIENT
Start: 2024-08-26 | End: 2024-08-27 | Stop reason: HOSPADM

## 2024-08-26 RX ORDER — SODIUM CHLORIDE 0.9 % (FLUSH) 0.9 %
3 SYRINGE (ML) INJECTION EVERY 12 HOURS SCHEDULED
Status: DISCONTINUED | OUTPATIENT
Start: 2024-08-26 | End: 2024-08-27 | Stop reason: HOSPADM

## 2024-08-26 RX ORDER — LOSARTAN POTASSIUM 25 MG/1
25 TABLET ORAL
Status: DISCONTINUED | OUTPATIENT
Start: 2024-08-26 | End: 2024-08-27 | Stop reason: HOSPADM

## 2024-08-26 RX ORDER — LORAZEPAM 0.5 MG/1
0.5 TABLET ORAL EVERY 6 HOURS PRN
Status: DISCONTINUED | OUTPATIENT
Start: 2024-08-26 | End: 2024-08-27 | Stop reason: HOSPADM

## 2024-08-26 RX ORDER — NICOTINE 21 MG/24HR
1 PATCH, TRANSDERMAL 24 HOURS TRANSDERMAL
Status: DISCONTINUED | OUTPATIENT
Start: 2024-08-26 | End: 2024-08-27 | Stop reason: HOSPADM

## 2024-08-26 RX ORDER — SODIUM CHLORIDE 0.9 % (FLUSH) 0.9 %
3-10 SYRINGE (ML) INJECTION AS NEEDED
Status: DISCONTINUED | OUTPATIENT
Start: 2024-08-26 | End: 2024-08-27 | Stop reason: HOSPADM

## 2024-08-26 RX ADMIN — ASPIRIN 81 MG CHEWABLE TABLET 81 MG: 81 TABLET CHEWABLE at 09:21

## 2024-08-26 RX ADMIN — Medication 10 ML: at 09:21

## 2024-08-26 RX ADMIN — Medication 10 ML: at 21:31

## 2024-08-26 RX ADMIN — TETROFOSMIN 1 DOSE: 1.38 INJECTION, POWDER, LYOPHILIZED, FOR SOLUTION INTRAVENOUS at 08:00

## 2024-08-26 RX ADMIN — ROSUVASTATIN 10 MG: 10 TABLET, FILM COATED ORAL at 21:30

## 2024-08-26 RX ADMIN — TETROFOSMIN 1 DOSE: 1.38 INJECTION, POWDER, LYOPHILIZED, FOR SOLUTION INTRAVENOUS at 07:07

## 2024-08-26 RX ADMIN — LORAZEPAM 0.5 MG: 0.5 TABLET ORAL at 17:10

## 2024-08-26 RX ADMIN — ROSUVASTATIN 10 MG: 10 TABLET, FILM COATED ORAL at 00:45

## 2024-08-26 RX ADMIN — LORAZEPAM 0.5 MG: 0.5 TABLET ORAL at 23:12

## 2024-08-26 RX ADMIN — Medication 3 ML: at 21:31

## 2024-08-26 RX ADMIN — HYDRALAZINE HYDROCHLORIDE 10 MG: 20 INJECTION, SOLUTION INTRAMUSCULAR; INTRAVENOUS at 14:39

## 2024-08-26 RX ADMIN — NICOTINE 1 PATCH: 14 PATCH, EXTENDED RELEASE TRANSDERMAL at 21:53

## 2024-08-26 NOTE — PLAN OF CARE
Problem: Chest Pain  Goal: Resolution of Chest Pain Symptoms  Outcome: Ongoing, Progressing  Intervention: Manage Acute Chest Pain  Recent Flowsheet Documentation  Taken 8/25/2024 2030 by Shante Chanel RN  Chest Pain Intervention: cardiac monitoring continued   Goal Outcome Evaluation:

## 2024-08-26 NOTE — PLAN OF CARE
Problem: Adult Inpatient Plan of Care  Goal: Plan of Care Review  Outcome: Ongoing, Progressing  Goal: Patient-Specific Goal (Individualized)  Outcome: Ongoing, Progressing  Goal: Absence of Hospital-Acquired Illness or Injury  Outcome: Ongoing, Progressing  Intervention: Identify and Manage Fall Risk  Recent Flowsheet Documentation  Taken 8/26/2024 1600 by Samra Katz RN  Safety Promotion/Fall Prevention:   safety round/check completed   assistive device/personal items within reach   clutter free environment maintained   fall prevention program maintained   nonskid shoes/slippers when out of bed   room organization consistent  Taken 8/26/2024 1400 by Samra Katz RN  Safety Promotion/Fall Prevention:   safety round/check completed   assistive device/personal items within reach   clutter free environment maintained   fall prevention program maintained   nonskid shoes/slippers when out of bed   room organization consistent  Taken 8/26/2024 1200 by Samra Katz RN  Safety Promotion/Fall Prevention:   safety round/check completed   assistive device/personal items within reach   clutter free environment maintained   nonskid shoes/slippers when out of bed   room organization consistent  Taken 8/26/2024 1000 by Samra Katz RN  Safety Promotion/Fall Prevention:   safety round/check completed   clutter free environment maintained   assistive device/personal items within reach   nonskid shoes/slippers when out of bed   room organization consistent  Taken 8/26/2024 0850 by Samra Katz RN  Safety Promotion/Fall Prevention:   safety round/check completed   assistive device/personal items within reach   clutter free environment maintained   room organization consistent  Taken 8/26/2024 0800 by Samra Katz RN  Safety Promotion/Fall Prevention: patient off unit  Intervention: Prevent Skin Injury  Recent Flowsheet Documentation  Taken 8/26/2024 0850 by Samra Katz, RN  Body Position: position changed  independently  Skin Protection:   adhesive use limited   transparent dressing maintained  Intervention: Prevent and Manage VTE (Venous Thromboembolism) Risk  Recent Flowsheet Documentation  Taken 8/26/2024 0850 by Samra Katz, RN  Activity Management: ambulated in room  VTE Prevention/Management: sequential compression devices off  Range of Motion: active ROM (range of motion) encouraged  Intervention: Prevent Infection  Recent Flowsheet Documentation  Taken 8/26/2024 0850 by Samra Katz, RN  Infection Prevention:   hand hygiene promoted   rest/sleep promoted   single patient room provided  Goal: Optimal Comfort and Wellbeing  Outcome: Ongoing, Progressing  Intervention: Provide Person-Centered Care  Recent Flowsheet Documentation  Taken 8/26/2024 0850 by Samra Katz, RN  Trust Relationship/Rapport:   care explained   questions answered  Goal: Readiness for Transition of Care  Outcome: Ongoing, Progressing   Goal Outcome Evaluation:   Plan for heart cath 8/27 at 8am d/t abnormal stress test. Consent signed.

## 2024-08-26 NOTE — CONSULTS
[facility]   CONSULT NOTE    Referring Provider: Jm Francisco MD    Reason for Consultation: abnormal myoview      Patient Care Team:  Arturo Ac MD as PCP - General (Internal Medicine)      Chief complaint: chest pain    History of present illness:  Caleb Vargas is a 39 y.o. male with no past cardiac medical history, hyperlipidemia, REGGIE on CPAP at night who presented to the ED with palpitations and chest pain. Patient reports that over the past week he has been experiencing palpitations and chest discomfort that extends up into his left shoulder. He reports its been progressively worse the past 3 days. Patient denies shortness of breath, dizziness, lightheadedness, or lower extremity edema.    Work up in the ED showed normal serial troponins, BNP within normal limits, CMP and CBC unremarkable, TSH WNL, Lipid panel elevated (, HDL 33, Trig 190)  CXR negative, EKG sinus with no ST changes  Myoview with mild posterolateral and anterior decreased uptake with partial redistribution suggestive of infarction and associated ischemia      Review of Systems   Constitutional: Negative for fever and malaise/fatigue.   Cardiovascular:  Positive for chest pain, dyspnea on exertion and palpitations. Negative for syncope.   Respiratory:  Negative for cough and shortness of breath.    Gastrointestinal:  Negative for nausea and vomiting.   Neurological:  Negative for dizziness and light-headedness.   All other systems reviewed and are negative.      History  Past Medical History:   Diagnosis Date    Abdominal pain     Agitation     Allergies     Apnea     Change in weight     Chest congestion     Choking     Cough     GERD (gastroesophageal reflux disease)     High blood pressure     Obesity     Sleep disturbance     Snoring     Wheezing        Past Surgical History:   Procedure Laterality Date    CLEFT PALATE REPAIR      FOOT ARTHROPLASTY Left 2020    SINUS SURGERY       TONSILLECTOMY         Family History   Problem Relation Age of Onset    No Known Problems Mother     No Known Problems Father     No Known Problems Brother        Social History     Tobacco Use    Smoking status: Every Day     Current packs/day: 0.50     Types: Cigarettes     Passive exposure: Current    Smokeless tobacco: Never   Vaping Use    Vaping status: Never Used   Substance Use Topics    Alcohol use: Yes     Comment: 5 beers every other week    Drug use: Never        Medications Prior to Admission   Medication Sig Dispense Refill Last Dose    rosuvastatin (CRESTOR) 10 MG tablet Take 1 tablet by mouth Every Night.   8/24/2024         Albuterol sulfate, Oseltamivir, Atorvastatin, Latex, Latex, and Tamiflu [oseltamivir phosphate]    Scheduled Meds:  aspirin, 81 mg, Oral, Daily  enoxaparin, 40 mg, Subcutaneous, Daily  rosuvastatin, 10 mg, Oral, Nightly  sodium chloride, 10 mL, Intravenous, Q12H        Continuous Infusions:       PRN Meds:    aluminum-magnesium hydroxide-simethicone    senna-docusate sodium **AND** polyethylene glycol **AND** bisacodyl **AND** bisacodyl    Calcium Replacement - Follow Nurse / BPA Driven Protocol    hydrALAZINE    HYDROcodone-acetaminophen    Magnesium Standard Dose Replacement - Follow Nurse / BPA Driven Protocol    melatonin    nitroglycerin    ondansetron ODT **OR** ondansetron    Phosphorus Replacement - Follow Nurse / BPA Driven Protocol    Potassium Replacement - Follow Nurse / BPA Driven Protocol    sodium chloride    sodium chloride    sodium chloride    sodium chloride      VITAL SIGNS  Vitals:    08/26/24 0208 08/26/24 0507 08/26/24 0944 08/26/24 1124   BP: 115/72 131/87 151/83    BP Location: Right arm Right arm Right arm    Patient Position: Lying Lying Lying    Pulse: 56 53 64    Resp: 18 18 19    Temp: 98 °F (36.7 °C) 97.6 °F (36.4 °C) 97.5 °F (36.4 °C)    TempSrc: Oral Oral Oral    SpO2: 96% 97% 97%    Weight:  126 kg (277 lb 12.5 oz)  126 kg (277 lb)   Height:     "180.3 cm (71\")       Flowsheet Rows      Flowsheet Row First Filed Value   Admission Height 180.3 cm (71\") Documented at 08/25/2024 1117   Admission Weight 126 kg (277 lb 9 oz) Documented at 08/25/2024 1117             TELEMETRY: sinus rhythm    Physical Exam:  Vitals reviewed.   Constitutional:       General: Awake.      Appearance: Not in distress. Obese.   Eyes:      Pupils: Pupils are equal, round, and reactive to light.   Pulmonary:      Effort: Pulmonary effort is normal.      Breath sounds: Normal breath sounds.   Cardiovascular:      Normal rate. Regular rhythm. Normal S1. Normal S2.       Murmurs: There is no murmur.   Pulses:     Intact distal pulses.   Edema:     Peripheral edema absent.   Abdominal:      General: Bowel sounds are normal.   Musculoskeletal: Normal range of motion. Skin:     General: Skin is warm and dry.   Neurological:      Mental Status: Alert and oriented to person, place and time.   Psychiatric:         Behavior: Behavior is cooperative.            LAB RESULTS (LAST 7 DAYS)    CMP   Results from last 7 days   Lab Units 08/26/24  0342 08/25/24  1130   SODIUM mmol/L 139 139   POTASSIUM mmol/L 4.2 4.1   CHLORIDE mmol/L 105 103   CO2 mmol/L 26.1 26.8   BUN mg/dL 15 17   CREATININE mg/dL 0.84 0.99   GLUCOSE mg/dL 85 101*   ALBUMIN g/dL 4.1 4.6   BILIRUBIN mg/dL 0.2 0.3   ALK PHOS U/L 74 88   AST (SGOT) U/L 24 30   ALT (SGPT) U/L 48* 60*       BMP  Results from last 7 days   Lab Units 08/26/24  0342 08/25/24  1130   SODIUM mmol/L 139 139   POTASSIUM mmol/L 4.2 4.1   CHLORIDE mmol/L 105 103   CO2 mmol/L 26.1 26.8   BUN mg/dL 15 17   CREATININE mg/dL 0.84 0.99   GLUCOSE mg/dL 85 101*   MAGNESIUM mg/dL 2.3 2.2       CBC  Results from last 7 days   Lab Units 08/26/24  0342 08/25/24  1130   WBC 10*3/mm3 7.79 5.55   RBC 10*6/mm3 4.49 5.07   HEMOGLOBIN g/dL 13.6 15.5   HEMATOCRIT % 42.3 47.5   MCV fL 94.2 93.7   PLATELETS 10*3/mm3 225 253       ProBNP        TROPONIN  Results from last 7 days "   Lab Units 08/25/24  1350   HSTROP T ng/L 7       Creatinine Clearance  Estimated Creatinine Clearance: 156.5 mL/min (by C-G formula based on SCr of 0.84 mg/dL).      Radiology  XR Chest 2 View    Result Date: 8/25/2024  Impression: No acute intrathoracic finding. Electronically Signed: Brendan Maherguy  8/25/2024 12:54 PM EDT  Workstation ID: [id number]     EKG          I personally viewed and interpreted the patient's EKG/Telemetry data: Sinus rhythm  ECG 12 Lead Chest Pain   Final Result   HEART RATE=69  bpm   RR Fepcijkq=807  ms   PA Gqmedpiw=709  ms   P Horizontal Axis=11  deg   P Front Axis=32  deg   QRSD Interval=92  ms   QT Czyedrzg=790  ms   WBnG=477  ms   QRS Axis=27  deg   T Wave Axis=68  deg   - NORMAL ECG -   Sinus rhythm   When compared with ECG of 04-Feb-2024 20:58:25,   Significant rate decrease   Electronically Signed By: Jm Francisco (HAN) 2024-08-26 06:23:26   Date and Time of Study:2024-08-25 11:19:35      Telemetry Scan   Final Result      Telemetry Scan   Final Result      Telemetry Scan   Final Result          ECHOCARDIOGRAM:      STRESS MYOVIEW:  Results for orders placed during the hospital encounter of 08/25/24    Stress Test With Myocardial Perfusion One Day    Interpretation Summary    Low risk for ischemic heart disease.    Indications  Chest pain    This study was performed under the direct supervision of Nivia BAXTER.    Resting ECG  Sinus rhythm    Patient exercised for 9 minutes using the Rikki protocol.  The maximum heart rate achieved was 155 and maximum systolic blood pressure 185.  Patient did not have any chest discomfort ST-T wave abnormalities or ectopy.    Cardiolite was used as an imaging agent.    Cardiolite images showed mild posterolateral and anterior decreased radionuclide uptake with partial redistribution.    Gated SPECT images revealed normal left ventricular size and contractility with ejection fraction of 67%.    Impression  ========    Good exercise  tolerance.    Cardiolite images showed mild posterolateral and anterior decreased radionuclide uptake with partial redistribution suggestive of infarction and associated ischemia..    Gated SPECT images revealed normal left ventricular size and contractility with ejection fraction of 67%.      CARDIAC CATHETERIZATION:  No results found for this or any previous visit.      OTHER:         Assessment & Plan      Caleb Vargas is a 39 y.o. male with no past cardiac medical history, hyperlipidemia, REGGIE on CPAP at night who presented to the ED with palpitations and chest pain. Patient reports that over the past week he has been experiencing palpitations and chest discomfort that extends up into his left shoulder. He reports its been progressively worse the past 3 days. Patient denies shortness of breath, dizziness, lightheadedness, or lower extremity edema.    Work up in the ED showed normal serial troponins, BNP within normal limits, CMP and CBC unremarkable, TSH WNL, Lipid panel elevated (, HDL 33, Trig 190)  CXR negative, EKG sinus with no ST changes  Myoview with mild posterolateral and anterior decreased uptake with partial redistribution suggestive of infarction and associated ischemia        Chest pain  Hypertension  Hyperlipidemia  REGGIE on CPAP  Prediabetes  Tobacco use    PLAN    Chest pain  -EKG without ST changes  -Normal serial troponins  -unknown family history of cardiac disease  -Abnormal myoview suggestive of infarction and associated ischemia  -not on any home medication  -Echocardiogram results pending    HTN    HLD    Intolerance to atorvastatin oseltamivir albuterol latex Tamiflu  -on rosuvastatin  -Lipid panel abnormal, improved from prior labs in March 2024  -Total 179, HDL 33, , Trig 190  -SBP in 130-140 range  -Recommend starting ACEi or ARB for hypertension  -discussed lifestyle modifications  -patient already making diet and exercise changes    REGGIE w/CPAP  -continue nightly  use    Prediabetes  -A1c March 2024 6.0, repeat pending  -discussed lifestyle modifications    Tobacco use  -patient actively trying to quit    I discussed the patients findings and my recommendations with patient and nurse.  Further recommendations per Dr. Yoder.    ELIEL Prado  08/26/24  12:33 EDT  Electronically signed by ELIEL Prado, 08/26/24, 12:50 PM EDT.    The patient was seen around 1 PM today.  Chart was reviewed in entirety.  Reviewed and agreed with the assessment and plan as documented by nurse practitioner Jaida Monzon.  Majority of the MDM was performed by me.  Patient is having chest discomfort which is not typical for angina pectoris.  Patient has multiple coronary risk factors.  Patient has significant anxiety issues.  Stress Cardiolite test is abnormal.  Patient was advised cardiac catheterization and coronary arteriography for definite evaluation of coronary artery status.  Risks and benefits pros and cons of the procedure including infection bleeding blood clot heart attack stroke allergic reaction to the dye renal dysfunction etc. were discussed.  Further plan will depend on patient's progress.  [[[[[[[[[[[[[[[[[[[[[    Electronically signed by Theo Yoder MD, 08/26/24, 1:23 PM EDT.    Echocardiogram 8/26/2024 revealed  Structurally and functionally normal cardiac valves except for mild tricuspid and minimal pulmonic regurgitation.  Mild left atrial enlargement..  Left ventricular size and contractility is normal with ejection fraction of 60%.  Grade 2 left ventricular diastolic dysfunction is present.  Mild pulmonary hypertension is present.  (38 mmHg.)

## 2024-08-26 NOTE — H&P
[facility] H&P    Patient Name: Caleb Vargas  : 1985  MRN: [id number]  Primary Care Physician: Arturo Ac MD  Date of admission: 2024     Patient Care Team:  Arturo Ac MD as PCP - General (Internal Medicine)          Subjective   History Present Illness     Chief Complaint:   Chief Complaint   Patient presents with    Chest Pain     Chest Pain     Mr. Vargas is a 39 y.o.  presents to [facility] complaining of chest pain       History of Present Illness    ED 24: 39-year-old male presents the ED with complaints of heart palpitations, left anterior chest pain that intermittently radiates down the left arm, fatigue, dizziness when standing, intermittent pain that radiates down the left leg has been ongoing for the last 3 days. States he has been trying to stop smoking and has been smoking less than half a pack per day. Reports he only medication he takes daily is rosuvastatin, past medical history of hypertension. Does have sleep apnea and uses his CPAP machine every night. Denies abdominal pain, nausea, vomiting, shortness of breath, numbness, tingling, weakness, changes in vision, fever, trauma to the back or back pain. Does report that he had a head injury 2024 while at work and was seen here for that with a normal head CT.    Observation 24: Patient is a 39 year old male presenting with left sided chest pain. Pain started yesterday with left sided 8/10 chest pain that radiated to his left arm.  Patient denies dizziness, blurry vision, headache, syncope or shortness of breath.  Patient states he only takes statin and no other medications.  Patient does have sleep apnea and use CPAP nightly.  Patient had abnormal Myoview and scheduled to have cardiac catheterization tomorrow.    Review of Systems   Constitutional: Positive for malaise/fatigue.   HENT: Negative.     Eyes: Negative.    Cardiovascular:  Positive for chest pain and dyspnea on exertion.    Respiratory:  Positive for shortness of breath.    Endocrine: Negative.    Hematologic/Lymphatic: Negative.    Skin: Negative.    Musculoskeletal: Negative.    Gastrointestinal: Negative.    Genitourinary: Negative.    Neurological: Negative.    Psychiatric/Behavioral:  The patient is nervous/anxious.            Personal History     Past Medical History:   Past Medical History:   Diagnosis Date    Abdominal pain     Agitation     Allergies     Apnea     Change in weight     Chest congestion     Choking     Cough     GERD (gastroesophageal reflux disease)     High blood pressure     Obesity     Sleep disturbance     Snoring     Wheezing        Surgical History:      Past Surgical History:   Procedure Laterality Date    CLEFT PALATE REPAIR      FOOT ARTHROPLASTY Left 2020    SINUS SURGERY      TONSILLECTOMY             Family History: family history includes No Known Problems in his brother, father, and mother. Otherwise pertinent FHx was reviewed and unremarkable.     Social History:  reports that he has been smoking cigarettes. He has been exposed to tobacco smoke. He has never used smokeless tobacco. He reports current alcohol use. He reports that he does not use drugs.      Medications:  Prior to Admission medications    Medication Sig Start Date End Date Taking? Authorizing Provider   rosuvastatin (CRESTOR) 10 MG tablet Take 1 tablet by mouth Every Night.   Yes Provider, Historical, MD       Allergies:    Allergies   Allergen Reactions    Albuterol Sulfate Shortness Of Breath    Oseltamivir Nausea And Vomiting, GI Intolerance and Unknown (See Comments)    Atorvastatin Unknown - Low Severity    Latex Hives    Latex Rash    Tamiflu [Oseltamivir Phosphate] Nausea And Vomiting       Objective   Objective     Vital Signs  Temp:  [97.5 °F (36.4 °C)-98 °F (36.7 °C)] 97.5 °F (36.4 °C)  Heart Rate:  [53-72] 64  Resp:  [18-19] 19  BP: (115-151)/(63-93) 151/83  SpO2:  [95 %-97 %] 97 %  on   ;   Device (Oxygen Therapy):  room air  Body mass index is 38.63 kg/m².    Physical Exam  Vitals and nursing note reviewed.   Constitutional:       Appearance: Normal appearance.   HENT:      Head: Normocephalic and atraumatic.      Right Ear: External ear normal.      Left Ear: External ear normal.      Nose: Nose normal.      Mouth/Throat:      Pharynx: Oropharynx is clear.   Eyes:      Extraocular Movements: Extraocular movements intact.      Conjunctiva/sclera: Conjunctivae normal.      Pupils: Pupils are equal, round, and reactive to light.   Cardiovascular:      Rate and Rhythm: Normal rate and regular rhythm.      Pulses: Normal pulses.   Pulmonary:      Effort: Pulmonary effort is normal.   Abdominal:      General: Bowel sounds are normal.   Musculoskeletal:         General: Normal range of motion.      Cervical back: Normal range of motion.   Skin:     Capillary Refill: Capillary refill takes less than 2 seconds.   Neurological:      Mental Status: He is alert and oriented to person, place, and time.   Psychiatric:         Mood and Affect: Mood is anxious.         Behavior: Behavior normal.         Thought Content: Thought content normal.         Judgment: Judgment normal.           Results Review:  I have personally reviewed most recent cardiac tracings, lab results, microbiology results, and radiology images and interpretations and agree with findings, most notably: Cbc, CMP, troponin, EKG, myoview.    Results from last 7 days   Lab Units 08/26/24  0342   WBC 10*3/mm3 7.79   HEMOGLOBIN g/dL 13.6   HEMATOCRIT % 42.3   PLATELETS 10*3/mm3 225     Results from last 7 days   Lab Units 08/26/24  0342 08/25/24  1350 08/25/24  1130   SODIUM mmol/L 139  --  139   POTASSIUM mmol/L 4.2  --  4.1   CHLORIDE mmol/L 105  --  103   CO2 mmol/L 26.1  --  26.8   BUN mg/dL 15  --  17   CREATININE mg/dL 0.84  --  0.99   GLUCOSE mg/dL 85  --  101*   CALCIUM mg/dL 8.8  --  9.8   ALK PHOS U/L 74  --  88   ALT (SGPT) U/L 48*  --  60*   AST (SGOT) U/L 24  --   30   HSTROP T ng/L  --  7 <6   PROBNP pg/mL  --   --  <36.0     Estimated Creatinine Clearance: 156.5 mL/min (by C-G formula based on SCr of 0.84 mg/dL).  Brief Urine Lab Results  (Last result in the past 365 days)        Color   Clarity   Blood   Leuk Est   Nitrite   Protein   CREAT   Urine HCG        08/25/24 1332 Yellow   Clear   Negative   Trace   Negative   Negative                   Microbiology Results (last 10 days)       Procedure Component Value - Date/Time    Respiratory Panel PCR w/COVID-19(SARS-CoV-2) FELISA/GREG/HAN/PAD/COR/RICHARD In-House, NP Swab in UTM/VTM, 2 HR TAT - Swab, Nasopharynx [[id number]]  (Normal) Collected: 08/25/24 1303    Lab Status: Final result Specimen: Swab from Nasopharynx Updated: 08/25/24 1403     ADENOVIRUS, PCR Not Detected     Coronavirus 229E Not Detected     Coronavirus HKU1 Not Detected     Coronavirus NL63 Not Detected     Coronavirus OC43 Not Detected     COVID19 Not Detected     Human Metapneumovirus Not Detected     Human Rhinovirus/Enterovirus Not Detected     Influenza A PCR Not Detected     Influenza B PCR Not Detected     Parainfluenza Virus 1 Not Detected     Parainfluenza Virus 2 Not Detected     Parainfluenza Virus 3 Not Detected     Parainfluenza Virus 4 Not Detected     RSV, PCR Not Detected     Bordetella pertussis pcr Not Detected     Bordetella parapertussis PCR Not Detected     Chlamydophila pneumoniae PCR Not Detected     Mycoplasma pneumo by PCR Not Detected    Narrative:      In the setting of a positive respiratory panel with a viral infection PLUS a negative procalcitonin without other underlying concern for bacterial infection, consider observing off antibiotics or discontinuation of antibiotics and continue supportive care. If the respiratory panel is positive for atypical bacterial infection (Bordetella pertussis, Chlamydophila pneumoniae, or Mycoplasma pneumoniae), consider antibiotic de-escalation to target atypical bacterial infection.             ECG/EMG Results (most recent)       Procedure Component Value Units Date/Time    Telemetry Scan [[id number]] Resulted: 08/25/24     Updated: 08/25/24 2115    Telemetry Scan [[id number]] Resulted: 08/25/24     Updated: 08/26/24 0035    ECG 12 Lead Chest Pain [[id number]] Collected: 08/25/24 1119     Updated: 08/26/24 0624     QT Interval 351 ms      QTC Interval 377 ms     Narrative:      HEART RATE=69  bpm  RR Fegxtrcs=383  ms  AK Irypmjvq=979  ms  P Horizontal Axis=11  deg  P Front Axis=32  deg  QRSD Interval=92  ms  QT Cutywwxj=144  ms  POpB=483  ms  QRS Axis=27  deg  T Wave Axis=68  deg  - NORMAL ECG -  Sinus rhythm  When compared with ECG of 04-Feb-2024 20:58:25,  Significant rate decrease  Electronically Signed By: Jm Francisco (HAN) 2024-08-26 06:23:26  Date and Time of Study:2024-08-25 11:19:35    Telemetry Scan [[id number]] Resulted: 08/25/24     Updated: 08/26/24 1132    Adult Transthoracic Echo Complete W/ Cont if Necessary Per Protocol [[id number]] Resulted: 08/26/24 1124     Updated: 08/26/24 1150                    XR Chest 2 View    Result Date: 8/25/2024  Impression: No acute intrathoracic finding. Electronically Signed: Brendan Maldonado  8/25/2024 12:54 PM EDT  Workstation ID: [id number]       Estimated Creatinine Clearance: 156.5 mL/min (by C-G formula based on SCr of 0.84 mg/dL).    Assessment & Plan   Assessment/Plan       Active Hospital Problems    Diagnosis  POA    **Chest pain [R07.9]  Yes      Resolved Hospital Problems   No resolved problems to display.       Chest pain  Lab Results   Component Value Date    TROPONINT 7 08/25/2024    TROPONINT <6 08/25/2024    TROPONINT 8 02/04/2024   -BNP less than 36  -Chest X-ray: No acute findings  -EKG: Sinus rhythm  -UA: Trace ketones, continue IV fluids  -Stress Test: Mild post lateral and anterior uptake suggestive of infarct, EF 67%  -Echocardiogram ordered  -Telemetry  -Continue aspirin  -Respiratory panel negative   -Cardiology consulted with  planned [facility] on 8/27/24    Hyperlipidemia  -Continue statin   -Total cholesterol 179, HDL 33, , triglycerides 190      Tobacco dependence  -Nicotine patch  -Cessation encouraged      VTE Prophylaxis - Active VTE Prophylaxis:  Pharmacologic:        Start     Dose Route Frequency Stop    08/25/24 1700  Enoxaparin Sodium (LOVENOX) syringe 40 mg         40 mg SC Daily --                  Select Mechanical VTE Prophylaxis if Desired & Appropriate      CODE STATUS:    Code Status and Medical Interventions: CPR (Attempt to Resuscitate); Full Support   Ordered at: 08/25/24 1521     Code Status (Patient has no pulse and is not breathing):    CPR (Attempt to Resuscitate)     Medical Interventions (Patient has pulse or is breathing):    Full Support       This patient has been examined wearing personal protective equipment.     I discussed the patient's findings and my recommendations with patient, family, nursing staff, primary care team, and consulting provider.      Signature:Electronically signed by ELIEL Kaplan, 08/26/24, 11:51 AM EDT.      I spent 35 minutes caring for Caleb on this date of service. This time includes time spent by me in the following activities: reviewing tests, performing a medically appropriate examination and/or evaluation, counseling and educating the patient/family/caregiver, referring and communicating with other health care professionals, documenting information in the medical record, independently interpreting results and communicating that information with the patient/family/caregiver, care coordination, ordering medications, ordering test(s), ordering procedure(s), obtaining a separately obtained history, and reviewing a separately obtained history.

## 2024-08-27 ENCOUNTER — READMISSION MANAGEMENT (OUTPATIENT)
Dept: CALL CENTER | Facility: HOSPITAL | Age: 39
End: 2024-08-27
Payer: COMMERCIAL

## 2024-08-27 VITALS
HEART RATE: 82 BPM | HEIGHT: 71 IN | WEIGHT: 265.4 LBS | TEMPERATURE: 97.7 F | SYSTOLIC BLOOD PRESSURE: 150 MMHG | OXYGEN SATURATION: 95 % | RESPIRATION RATE: 16 BRPM | DIASTOLIC BLOOD PRESSURE: 83 MMHG | BODY MASS INDEX: 37.15 KG/M2

## 2024-08-27 LAB
ALBUMIN SERPL-MCNC: 4.2 G/DL (ref 3.5–5.2)
ALP SERPL-CCNC: 78 U/L (ref 39–117)
ALT SERPL W P-5'-P-CCNC: 50 U/L (ref 1–41)
ANION GAP SERPL CALCULATED.3IONS-SCNC: 8.2 MMOL/L (ref 5–15)
AST SERPL-CCNC: 29 U/L (ref 1–40)
BASOPHILS # BLD AUTO: 0.03 10*3/MM3 (ref 0–0.2)
BASOPHILS NFR BLD AUTO: 0.5 % (ref 0–1.5)
BILIRUB CONJ SERPL-MCNC: 0.1 MG/DL (ref 0–0.3)
BILIRUB INDIRECT SERPL-MCNC: 0.2 MG/DL
BILIRUB SERPL-MCNC: 0.3 MG/DL (ref 0–1.2)
BUN SERPL-MCNC: 14 MG/DL (ref 6–20)
BUN/CREAT SERPL: 16.1 (ref 7–25)
CALCIUM SPEC-SCNC: 9.1 MG/DL (ref 8.6–10.5)
CHLORIDE SERPL-SCNC: 106 MMOL/L (ref 98–107)
CO2 SERPL-SCNC: 25.8 MMOL/L (ref 22–29)
CREAT SERPL-MCNC: 0.87 MG/DL (ref 0.76–1.27)
DEPRECATED RDW RBC AUTO: 42.5 FL (ref 37–54)
EGFRCR SERPLBLD CKD-EPI 2021: 111.2 ML/MIN/1.73
EOSINOPHIL # BLD AUTO: 0.3 10*3/MM3 (ref 0–0.4)
EOSINOPHIL NFR BLD AUTO: 4.6 % (ref 0.3–6.2)
ERYTHROCYTE [DISTWIDTH] IN BLOOD BY AUTOMATED COUNT: 12.5 % (ref 12.3–15.4)
GLUCOSE SERPL-MCNC: 94 MG/DL (ref 65–99)
HCT VFR BLD AUTO: 44.8 % (ref 37.5–51)
HGB BLD-MCNC: 14.7 G/DL (ref 13–17.7)
IMM GRANULOCYTES # BLD AUTO: 0.02 10*3/MM3 (ref 0–0.05)
IMM GRANULOCYTES NFR BLD AUTO: 0.3 % (ref 0–0.5)
INR PPP: 1.02 (ref 0.93–1.1)
LYMPHOCYTES # BLD AUTO: 2.56 10*3/MM3 (ref 0.7–3.1)
LYMPHOCYTES NFR BLD AUTO: 38.8 % (ref 19.6–45.3)
MAGNESIUM SERPL-MCNC: 2.4 MG/DL (ref 1.6–2.6)
MCH RBC QN AUTO: 30.8 PG (ref 26.6–33)
MCHC RBC AUTO-ENTMCNC: 32.8 G/DL (ref 31.5–35.7)
MCV RBC AUTO: 93.7 FL (ref 79–97)
MONOCYTES # BLD AUTO: 0.72 10*3/MM3 (ref 0.1–0.9)
MONOCYTES NFR BLD AUTO: 10.9 % (ref 5–12)
NEUTROPHILS NFR BLD AUTO: 2.96 10*3/MM3 (ref 1.7–7)
NEUTROPHILS NFR BLD AUTO: 44.9 % (ref 42.7–76)
NRBC BLD AUTO-RTO: 0 /100 WBC (ref 0–0.2)
PLATELET # BLD AUTO: 231 10*3/MM3 (ref 140–450)
PMV BLD AUTO: 9.3 FL (ref 6–12)
POTASSIUM SERPL-SCNC: 4.2 MMOL/L (ref 3.5–5.2)
PROT SERPL-MCNC: 6.7 G/DL (ref 6–8.5)
PROTHROMBIN TIME: 11.1 SECONDS (ref 9.6–11.7)
RBC # BLD AUTO: 4.78 10*6/MM3 (ref 4.14–5.8)
SODIUM SERPL-SCNC: 140 MMOL/L (ref 136–145)
WBC NRBC COR # BLD AUTO: 6.59 10*3/MM3 (ref 3.4–10.8)

## 2024-08-27 PROCEDURE — 83735 ASSAY OF MAGNESIUM: CPT | Performed by: PHYSICIAN ASSISTANT

## 2024-08-27 PROCEDURE — 25010000002 LIDOCAINE 1 % SOLUTION: Performed by: INTERNAL MEDICINE

## 2024-08-27 PROCEDURE — 85610 PROTHROMBIN TIME: CPT | Performed by: INTERNAL MEDICINE

## 2024-08-27 PROCEDURE — 99214 OFFICE O/P EST MOD 30 MIN: CPT | Performed by: INTERNAL MEDICINE

## 2024-08-27 PROCEDURE — 80076 HEPATIC FUNCTION PANEL: CPT | Performed by: NURSE PRACTITIONER

## 2024-08-27 PROCEDURE — 99152 MOD SED SAME PHYS/QHP 5/>YRS: CPT | Performed by: INTERNAL MEDICINE

## 2024-08-27 PROCEDURE — 25010000002 MIDAZOLAM PER 1 MG: Performed by: INTERNAL MEDICINE

## 2024-08-27 PROCEDURE — 25010000002 FENTANYL CITRATE (PF) 100 MCG/2ML SOLUTION: Performed by: INTERNAL MEDICINE

## 2024-08-27 PROCEDURE — G0378 HOSPITAL OBSERVATION PER HR: HCPCS

## 2024-08-27 PROCEDURE — 25810000003 SODIUM CHLORIDE 0.9 % SOLUTION: Performed by: INTERNAL MEDICINE

## 2024-08-27 PROCEDURE — 25510000001 IOPAMIDOL PER 1 ML: Performed by: INTERNAL MEDICINE

## 2024-08-27 PROCEDURE — C1894 INTRO/SHEATH, NON-LASER: HCPCS | Performed by: INTERNAL MEDICINE

## 2024-08-27 PROCEDURE — C1769 GUIDE WIRE: HCPCS | Performed by: INTERNAL MEDICINE

## 2024-08-27 PROCEDURE — 85025 COMPLETE CBC W/AUTO DIFF WBC: CPT | Performed by: PHYSICIAN ASSISTANT

## 2024-08-27 PROCEDURE — 93460 R&L HRT ART/VENTRICLE ANGIO: CPT | Performed by: INTERNAL MEDICINE

## 2024-08-27 PROCEDURE — 80048 BASIC METABOLIC PNL TOTAL CA: CPT | Performed by: PHYSICIAN ASSISTANT

## 2024-08-27 RX ORDER — ONDANSETRON 4 MG/1
4 TABLET, ORALLY DISINTEGRATING ORAL EVERY 6 HOURS PRN
Status: DISCONTINUED | OUTPATIENT
Start: 2024-08-27 | End: 2024-08-27 | Stop reason: HOSPADM

## 2024-08-27 RX ORDER — SODIUM CHLORIDE 9 MG/ML
250 INJECTION, SOLUTION INTRAVENOUS ONCE AS NEEDED
Status: DISCONTINUED | OUTPATIENT
Start: 2024-08-27 | End: 2024-08-27 | Stop reason: HOSPADM

## 2024-08-27 RX ORDER — SODIUM CHLORIDE 9 MG/ML
INJECTION, SOLUTION INTRAVENOUS
Status: COMPLETED | OUTPATIENT
Start: 2024-08-27 | End: 2024-08-27

## 2024-08-27 RX ORDER — DIPHENHYDRAMINE HCL 25 MG
25 CAPSULE ORAL EVERY 6 HOURS PRN
Status: DISCONTINUED | OUTPATIENT
Start: 2024-08-27 | End: 2024-08-27 | Stop reason: HOSPADM

## 2024-08-27 RX ORDER — IOPAMIDOL 755 MG/ML
INJECTION, SOLUTION INTRAVASCULAR
Status: DISCONTINUED | OUTPATIENT
Start: 2024-08-27 | End: 2024-08-27 | Stop reason: HOSPADM

## 2024-08-27 RX ORDER — ASPIRIN 81 MG/1
81 TABLET, CHEWABLE ORAL DAILY
Qty: 34 TABLET | Refills: 0 | Status: SHIPPED | OUTPATIENT
Start: 2024-08-27

## 2024-08-27 RX ORDER — LIDOCAINE HYDROCHLORIDE 10 MG/ML
INJECTION, SOLUTION INFILTRATION; PERINEURAL
Status: DISCONTINUED | OUTPATIENT
Start: 2024-08-27 | End: 2024-08-27 | Stop reason: HOSPADM

## 2024-08-27 RX ORDER — LOSARTAN POTASSIUM 25 MG/1
25 TABLET ORAL
Qty: 34 TABLET | Refills: 0 | Status: SHIPPED | OUTPATIENT
Start: 2024-08-27

## 2024-08-27 RX ORDER — ACETAMINOPHEN 325 MG/1
650 TABLET ORAL EVERY 4 HOURS PRN
Status: DISCONTINUED | OUTPATIENT
Start: 2024-08-27 | End: 2024-08-27 | Stop reason: HOSPADM

## 2024-08-27 RX ORDER — MIDAZOLAM HYDROCHLORIDE 1 MG/ML
INJECTION INTRAMUSCULAR; INTRAVENOUS
Status: DISCONTINUED | OUTPATIENT
Start: 2024-08-27 | End: 2024-08-27 | Stop reason: HOSPADM

## 2024-08-27 RX ORDER — ONDANSETRON 2 MG/ML
4 INJECTION INTRAMUSCULAR; INTRAVENOUS EVERY 6 HOURS PRN
Status: DISCONTINUED | OUTPATIENT
Start: 2024-08-27 | End: 2024-08-27 | Stop reason: HOSPADM

## 2024-08-27 RX ORDER — FENTANYL CITRATE 50 UG/ML
INJECTION, SOLUTION INTRAMUSCULAR; INTRAVENOUS
Status: DISCONTINUED | OUTPATIENT
Start: 2024-08-27 | End: 2024-08-27 | Stop reason: HOSPADM

## 2024-08-27 RX ORDER — NICOTINE 21 MG/24HR
1 PATCH, TRANSDERMAL 24 HOURS TRANSDERMAL
Qty: 30 EACH | Refills: 0 | Status: SHIPPED | OUTPATIENT
Start: 2024-08-28

## 2024-08-27 RX ADMIN — HYDROCODONE BITARTRATE AND ACETAMINOPHEN 1 TABLET: 5; 325 TABLET ORAL at 10:34

## 2024-08-27 RX ADMIN — LORAZEPAM 0.5 MG: 0.5 TABLET ORAL at 06:23

## 2024-08-27 RX ADMIN — ASPIRIN 81 MG CHEWABLE TABLET 81 MG: 81 TABLET CHEWABLE at 10:34

## 2024-08-27 RX ADMIN — Medication 10 ML: at 10:38

## 2024-08-27 RX ADMIN — LOSARTAN POTASSIUM 25 MG: 25 TABLET, FILM COATED ORAL at 10:28

## 2024-08-27 NOTE — H&P (VIEW-ONLY)
Referring Provider: Jm Francisco MD    Reason for follow-up:  Chest pain  Positive stress Cardiolite test     Patient Care Team:  Arturo Ac MD as PCP - General (Internal Medicine)    Subjective .      ROS    Today, the patient has been without any chest discomfort ,shortness of breath, palpitations, dizziness or syncope.  Denies having any headache ,abdominal pain ,nausea, vomiting , diarrhea constipation, loss of weight or loss of appetite.  Denies having any excessive bruising ,hematuria or blood in the stool.    Review of all systems negative except as indicated    History  Past Medical History:   Diagnosis Date    Abdominal pain     Agitation     Allergies     Apnea     Change in weight     Chest congestion     Choking     Cough     GERD (gastroesophageal reflux disease)     High blood pressure     Obesity     Sleep disturbance     Snoring     Wheezing        Past Surgical History:   Procedure Laterality Date    CLEFT PALATE REPAIR      FOOT ARTHROPLASTY Left 2020    SINUS SURGERY      TONSILLECTOMY         Family History   Problem Relation Age of Onset    No Known Problems Mother     No Known Problems Father     No Known Problems Brother        Social History     Tobacco Use    Smoking status: Every Day     Current packs/day: 0.50     Types: Cigarettes     Passive exposure: Current    Smokeless tobacco: Never   Vaping Use    Vaping status: Never Used   Substance Use Topics    Alcohol use: Yes     Comment: 5 beers every other week    Drug use: Never        Medications Prior to Admission   Medication Sig Dispense Refill Last Dose    rosuvastatin (CRESTOR) 10 MG tablet Take 1 tablet by mouth Every Night.   8/24/2024       Allergies  Albuterol sulfate, Oseltamivir, Atorvastatin, Latex, Latex, and Tamiflu [oseltamivir phosphate]    Scheduled Meds:aspirin, 81 mg, Oral, Daily  enoxaparin, 40 mg, Subcutaneous, Daily  losartan, 25 mg, Oral, Q24H  nicotine, 1 patch, Transdermal, Q24H  rosuvastatin, 10 mg, Oral,  "Nightly  sodium chloride, 10 mL, Intravenous, Q12H  sodium chloride, 3 mL, Intravenous, Q12H      Continuous Infusions:   PRN Meds:.  aluminum-magnesium hydroxide-simethicone    senna-docusate sodium **AND** polyethylene glycol **AND** bisacodyl **AND** bisacodyl    Calcium Replacement - Follow Nurse / BPA Driven Protocol    hydrALAZINE    HYDROcodone-acetaminophen    LORazepam    Magnesium Standard Dose Replacement - Follow Nurse / BPA Driven Protocol    melatonin    nitroglycerin    ondansetron ODT **OR** ondansetron    Phosphorus Replacement - Follow Nurse / BPA Driven Protocol    Potassium Replacement - Follow Nurse / BPA Driven Protocol    sodium chloride    sodium chloride    sodium chloride    sodium chloride    sodium chloride    sodium chloride    Objective     VITAL SIGNS  Vitals:    08/26/24 1419 08/26/24 1729 08/26/24 2201 08/27/24 0302   BP: 171/80 152/88 156/94 (!) 141/111   BP Location: Right arm Right arm Right arm Right arm   Patient Position: Lying Lying Lying Lying   Pulse: 75 95 68 67   Resp: 20 19 17 17   Temp: 97.3 °F (36.3 °C) 97.5 °F (36.4 °C) 98.1 °F (36.7 °C) 98 °F (36.7 °C)   TempSrc: Oral Oral Oral Oral   SpO2: 96% 97% 97% 98%   Weight:       Height:           Flowsheet Rows      Flowsheet Row First Filed Value   Admission Height 180.3 cm (71\") Documented at 08/25/2024 1117   Admission Weight 126 kg (277 lb 9 oz) Documented at 08/25/2024 1117              Intake/Output Summary (Last 24 hours) at 8/27/2024 0521  Last data filed at 8/26/2024 1729  Gross per 24 hour   Intake 600 ml   Output 3 ml   Net 597 ml        TELEMETRY: Sinus rhythm    Physical Exam:  The patient is alert, oriented and in no distress.  Vital signs as noted above.  Exogenous obesity.  Head and neck revealed no carotid bruits or jugular venous distention.  No thyromegaly or lymphadenopathy is present  Lungs clear.  No wheezing.  Breath sounds are normal bilaterally.  Heart normal first and second heart sounds.  No " murmur. No precordial rub is present.  No gallop is present.  Abdomen soft and nontender.  No organomegaly is present.  Extremities with good peripheral pulses without any pedal edema.  Skin warm and dry.  Musculoskeletal system is grossly normal  CNS grossly normal    Reviewed and updated.    Results Review:   I reviewed the patient's new clinical results.  Lab Results (last 24 hours)       Procedure Component Value Units Date/Time    CBC & Differential [[id number]]  (Normal) Collected: 08/27/24 0435    Specimen: Blood from Arm, Right Updated: 08/27/24 0516    Narrative:      The following orders were created for panel order CBC & Differential.  Procedure                               Abnormality         Status                     ---------                               -----------         ------                     CBC Auto Differential[[id number]]        Normal              Final result                 Please view results for these tests on the individual orders.    CBC Auto Differential [[id number]]  (Normal) Collected: 08/27/24 0435    Specimen: Blood from Arm, Right Updated: 08/27/24 0516     WBC 6.59 10*3/mm3      RBC 4.78 10*6/mm3      Hemoglobin 14.7 g/dL      Hematocrit 44.8 %      MCV 93.7 fL      MCH 30.8 pg      MCHC 32.8 g/dL      RDW 12.5 %      RDW-SD 42.5 fl      MPV 9.3 fL      Platelets 231 10*3/mm3      Neutrophil % 44.9 %      Lymphocyte % 38.8 %      Monocyte % 10.9 %      Eosinophil % 4.6 %      Basophil % 0.5 %      Immature Grans % 0.3 %      Neutrophils, Absolute 2.96 10*3/mm3      Lymphocytes, Absolute 2.56 10*3/mm3      Monocytes, Absolute 0.72 10*3/mm3      Eosinophils, Absolute 0.30 10*3/mm3      Basophils, Absolute 0.03 10*3/mm3      Immature Grans, Absolute 0.02 10*3/mm3      nRBC 0.0 /100 WBC     Protime-INR [[id number]] Collected: 08/27/24 0435    Specimen: Blood from Arm, Right Updated: 08/27/24 0512    Basic Metabolic Panel [[id number]] Collected: 08/27/24 0435    Specimen: Blood from  Arm, Right Updated: 08/27/24 0512    Magnesium [[id number]] Collected: 08/27/24 0435    Specimen: Blood from Arm, Right Updated: 08/27/24 0512    Hemoglobin A1c [[id number]]  (Abnormal) Collected: 08/26/24 0342    Specimen: Blood from Arm, Left Updated: 08/26/24 1307     Hemoglobin A1C 6.08 %     Hepatic Function Panel [[id number]]  (Abnormal) Collected: 08/26/24 0342    Specimen: Blood from Arm, Left Updated: 08/26/24 0750     Total Protein 6.4 g/dL      Albumin 4.1 g/dL      ALT (SGPT) 48 U/L      AST (SGOT) 24 U/L      Alkaline Phosphatase 74 U/L      Total Bilirubin 0.2 mg/dL      Bilirubin, Direct 0.1 mg/dL      Bilirubin, Indirect 0.1 mg/dL             Imaging Results (Last 24 Hours)       ** No results found for the last 24 hours. **        LAB RESULTS (LAST 7 DAYS)    CBC  Results from last 7 days   Lab Units 08/27/24  0435 08/26/24  0342 08/25/24  1130   WBC 10*3/mm3 6.59 7.79 5.55   RBC 10*6/mm3 4.78 4.49 5.07   HEMOGLOBIN g/dL 14.7 13.6 15.5   HEMATOCRIT % 44.8 42.3 47.5   MCV fL 93.7 94.2 93.7   PLATELETS 10*3/mm3 231 225 253       BMP  Results from last 7 days   Lab Units 08/26/24  0342 08/25/24  1130   SODIUM mmol/L 139 139   POTASSIUM mmol/L 4.2 4.1   CHLORIDE mmol/L 105 103   CO2 mmol/L 26.1 26.8   BUN mg/dL 15 17   CREATININE mg/dL 0.84 0.99   GLUCOSE mg/dL 85 101*   MAGNESIUM mg/dL 2.3 2.2       CMP   Results from last 7 days   Lab Units 08/26/24  0342 08/25/24  1130   SODIUM mmol/L 139 139   POTASSIUM mmol/L 4.2 4.1   CHLORIDE mmol/L 105 103   CO2 mmol/L 26.1 26.8   BUN mg/dL 15 17   CREATININE mg/dL 0.84 0.99   GLUCOSE mg/dL 85 101*   ALBUMIN g/dL 4.1 4.6   BILIRUBIN mg/dL 0.2 0.3   ALK PHOS U/L 74 88   AST (SGOT) U/L 24 30   ALT (SGPT) U/L 48* 60*         BNP        TROPONIN  Results from last 7 days   Lab Units 08/25/24  1350   HSTROP T ng/L 7       CoAg        Creatinine Clearance  Estimated Creatinine Clearance: 156.5 mL/min (by C-G formula based on SCr of 0.84 mg/dL).    ABG         Radiology  XR Chest 2 View    Result Date: 8/25/2024  Impression: No acute intrathoracic finding. Electronically Signed: Brendan Maldonado  8/25/2024 12:54 PM EDT  Workstation ID: [id number]         EKG            I personally viewed and interpreted the patient's EKG/Telemetry data:    ECHOCARDIOGRAM:    Results for orders placed during the hospital encounter of 08/25/24    Adult Transthoracic Echo Complete W/ Cont if Necessary Per Protocol    Interpretation Summary    Left ventricular ejection fraction appears to be 56 - 60%.    Estimated right ventricular systolic pressure from tricuspid regurgitation is mildly elevated (35-45 mmHg).    Indications  Chest pain  Palpitations    Technically satisfactory study.  Mitral valve is structurally normal.  Tricuspid valve is structurally normal.  Mild tricuspid regurgitation is present.  Aortic valve is structurally normal.  Pulmonic valve could not be well visualized.  Minimal pulmonic regurgitation is present.  Mild pulmonary hypertension is present.  Left atrium is mildly enlarged.  Right atrium is normal in size.  Left ventricle is normal in size and contractility with ejection fraction of 60%.  Grade 2 left ventricular diastolic dysfunction is present.  Right ventricle is normal in size and contractility with TAPSE of 2.03 cm.  Atrial septum is intact.  Aorta is normal.  No pericardial effusion or intracardiac thrombus is seen.    Impression  Structurally and functionally normal cardiac valves except for mild tricuspid and minimal pulmonic regurgitation.  Mild left atrial enlargement..  Left ventricular size and contractility is normal with ejection fraction of 60%.  Grade 2 left ventricular diastolic dysfunction is present.  Mild pulmonary hypertension is present.  (38 mmHg.)          STRESS TEST  Results for orders placed during the hospital encounter of 08/25/24    Stress Test With Myocardial Perfusion One Day    Interpretation Summary    Low risk for ischemic heart  disease.    Indications  Chest pain    This study was performed under the direct supervision of Nivia BAXTER.    Resting ECG  Sinus rhythm    Patient exercised for 9 minutes using the Rikki protocol.  The maximum heart rate achieved was 155 and maximum systolic blood pressure 185.  Patient did not have any chest discomfort ST-T wave abnormalities or ectopy.    Cardiolite was used as an imaging agent.    Cardiolite images showed mild posterolateral and anterior decreased radionuclide uptake with partial redistribution.    Gated SPECT images revealed normal left ventricular size and contractility with ejection fraction of 67%.    Impression  ========    Good exercise tolerance.    Cardiolite images showed mild posterolateral and anterior decreased radionuclide uptake with partial redistribution suggestive of infarction and associated ischemia..    Gated SPECT images revealed normal left ventricular size and contractility with ejection fraction of 67%.        Cardiolite (Tc-99m sestamibi) stress test    CARDIAC CATHETERIZATION  No results found for this or any previous visit.                OTHER:         Assessment & Plan     Principal Problem:    Chest pain  Active Problems:    Chest discomfort    Abnormal nuclear stress test      Assessment & Plan  Caleb Vargas is a 39 y.o. male with no past cardiac medical history, hyperlipidemia, REGGIE on CPAP at night who presented to the ED with palpitations and chest pain. Patient reports that over the past week he has been experiencing palpitations and chest discomfort that extends up into his left shoulder. He reports its been progressively worse the past 3 days. Patient denies shortness of breath, dizziness, lightheadedness, or lower extremity edema.     Work up in the ED showed normal serial troponins, BNP within normal limits, CMP and CBC unremarkable, TSH WNL, Lipid panel elevated (, HDL 33, Trig 190)  CXR negative, EKG sinus with no ST changes  Myoview with mild  posterolateral and anterior decreased uptake with partial redistribution suggestive of infarction and associated ischemia      ]]]]]]]]]]]]]]]]]]]]]  Impression  ==========  -Chest discomfort-somewhat atypical.    EKG showed no acute changes.  Troponin levels are negative.  Abnormal stress Cardiolite test    Echocardiogram 8/26/2024 revealed  Structurally and functionally normal cardiac valves except for mild tricuspid and minimal pulmonic regurgitation.  Mild left atrial enlargement..  Left ventricular size and contractility is normal with ejection fraction of 60%.  Grade 2 left ventricular diastolic dysfunction is present.  Mild pulmonary hypertension is present.  (38 mmHg.)      - Hypertension dyslipidemia obstructive sleep apnea on CPAP prediabetes    - Smoker    - Intolerance to atorvastatin  Allergic to albuterol oseltamivir latex Tamiflu  Intolerance to atorvastatin oseltamivir albuterol latex Tamiflu  -on rosuvastatin  =====  Plan  =========  Patient is having chest discomfort which is not typical for angina pectoris.  Patient has multiple coronary risk factors.  Patient has significant anxiety issues.  Stress Cardiolite test is abnormal.    Echocardiogram 8/26/2024 revealed  Structurally and functionally normal cardiac valves except for mild tricuspid and minimal pulmonic regurgitation.  Mild left atrial enlargement..  Left ventricular size and contractility is normal with ejection fraction of 60%.  Grade 2 left ventricular diastolic dysfunction is present.  Mild pulmonary hypertension is present.  (38 mmHg.)    Patient was advised cardiac catheterization and coronary arteriography for definite evaluation of coronary artery status.  Risks and benefits pros and cons of the procedure including infection bleeding blood clot heart attack stroke allergic reaction to the dye renal dysfunction etc. were discussed.    Further plan will depend on patient's progress.    Reviewed and updated  8/27/2024.  [[[[[[[[[[[[[[[[[[[[[                Theo Yoder MD  08/27/24  05:21 EDT

## 2024-08-27 NOTE — DISCHARGE SUMMARY
[facility]    Arturo Ac MD    CHIEF COMPLAINT:     Chest Pain     HISTORY OF PRESENT ILLNESS:    [facility]  ED 8/25/24: 39-year-old male presents the ED with complaints of heart palpitations, left anterior chest pain that intermittently radiates down the left arm, fatigue, dizziness when standing, intermittent pain that radiates down the left leg has been ongoing for the last 3 days. States he has been trying to stop smoking and has been smoking less than half a pack per day. Reports he only medication he takes daily is rosuvastatin, past medical history of hypertension. Does have sleep apnea and uses his CPAP machine every night. Denies abdominal pain, nausea, vomiting, shortness of breath, numbness, tingling, weakness, changes in vision, fever, trauma to the back or back pain. Does report that he had a head injury 8/14/2024 while at work and was seen here for that with a normal head CT.     Observation 8/26/24: Patient is a 39 year old male presenting with left sided chest pain. Pain started yesterday with left sided 8/10 chest pain that radiated to his left arm.  Patient denies dizziness, blurry vision, headache, syncope or shortness of breath.  Patient states he only takes statin and no other medications.  Patient does have sleep apnea and use CPAP nightly.  Patient had abnormal Myoview and scheduled to have cardiac catheterization tomorrow.    Past Medical History:   Diagnosis Date    Abdominal pain     Agitation     Allergies     Apnea     Change in weight     Chest congestion     Choking     Cough     GERD (gastroesophageal reflux disease)     High blood pressure     Obesity     Sleep disturbance     Snoring     Wheezing      Past Surgical History:   Procedure Laterality Date    CARDIAC CATHETERIZATION  08/27/2024    NORMAL LEFT AND RIGHT HEART CATH WITH DR LANE    CLEFT PALATE REPAIR      FOOT ARTHROPLASTY Left 2020    SINUS SURGERY      TONSILLECTOMY       Family History   Problem  Relation Age of Onset    No Known Problems Mother     No Known Problems Father     No Known Problems Brother      Social History     Tobacco Use    Smoking status: Every Day     Current packs/day: 0.50     Types: Cigarettes     Passive exposure: Current    Smokeless tobacco: Never   Vaping Use    Vaping status: Never Used   Substance Use Topics    Alcohol use: Yes     Comment: 5 beers every other week    Drug use: Never     Medications Prior to Admission   Medication Sig Dispense Refill Last Dose    rosuvastatin (CRESTOR) 10 MG tablet Take 1 tablet by mouth Every Night.   8/24/2024     Allergies:  Albuterol sulfate, Oseltamivir, Atorvastatin, Latex, Latex, and Tamiflu [oseltamivir phosphate]    Immunization History   Administered Date(s) Administered    Td (TDVAX) 11/05/2017    Tdap 07/18/2015, 05/16/2024    Yellow Fever 10/27/2014           REVIEW OF SYSTEMS:    Review of Systems   Constitutional: Negative.   HENT: Negative.     Eyes: Negative.    Cardiovascular: Negative.    Respiratory: Negative.     Endocrine: Negative.    Hematologic/Lymphatic: Negative.    Skin: Negative.    Musculoskeletal: Negative.    Gastrointestinal: Negative.    Genitourinary: Negative.    Neurological: Negative.    Psychiatric/Behavioral:  The patient is nervous/anxious.        Vital Signs  Temp:  [97.3 °F (36.3 °C)-98.1 °F (36.7 °C)] 97.5 °F (36.4 °C)  Heart Rate:  [61-95] 65  Resp:  [14-20] 18  BP: (111-171)/() 156/84          Physical Exam:  Physical Exam  Vitals and nursing note reviewed.   Constitutional:       Appearance: Normal appearance.   HENT:      Head: Normocephalic and atraumatic.      Right Ear: External ear normal.      Left Ear: External ear normal.      Nose: Nose normal.      Mouth/Throat:      Pharynx: Oropharynx is clear.   Eyes:      Extraocular Movements: Extraocular movements intact.      Conjunctiva/sclera: Conjunctivae normal.      Pupils: Pupils are equal, round, and reactive to light.   Cardiovascular:       Rate and Rhythm: Normal rate and regular rhythm.      Pulses: Normal pulses.      Heart sounds: Normal heart sounds.   Pulmonary:      Effort: Pulmonary effort is normal.   Abdominal:      General: Bowel sounds are normal.   Musculoskeletal:         General: Normal range of motion.   Skin:     General: Skin is warm.      Capillary Refill: Capillary refill takes less than 2 seconds.   Neurological:      Mental Status: He is alert.         Emotional Behavior:    WNl   Debilities:   none  Results Review:    I reviewed the patient's new clinical results.  Lab Results (most recent)       Procedure Component Value Units Date/Time    Hepatic Function Panel [[id number]]  (Abnormal) Collected: 08/27/24 0435    Specimen: Blood from Arm, Right Updated: 08/27/24 0809     Total Protein 6.7 g/dL      Albumin 4.2 g/dL      ALT (SGPT) 50 U/L      AST (SGOT) 29 U/L      Alkaline Phosphatase 78 U/L      Total Bilirubin 0.3 mg/dL      Bilirubin, Direct 0.1 mg/dL      Bilirubin, Indirect 0.2 mg/dL     Basic Metabolic Panel [[id number]]  (Normal) Collected: 08/27/24 0435    Specimen: Blood from Arm, Right Updated: 08/27/24 0536     Glucose 94 mg/dL      BUN 14 mg/dL      Creatinine 0.87 mg/dL      Sodium 140 mmol/L      Potassium 4.2 mmol/L      Chloride 106 mmol/L      CO2 25.8 mmol/L      Calcium 9.1 mg/dL      BUN/Creatinine Ratio 16.1     Anion Gap 8.2 mmol/L      eGFR 111.2 mL/min/1.73     Narrative:      GFR Normal >60  Chronic Kidney Disease <60  Kidney Failure <15      Magnesium [[id number]]  (Normal) Collected: 08/27/24 0435    Specimen: Blood from Arm, Right Updated: 08/27/24 0536     Magnesium 2.4 mg/dL     Protime-INR [[id number]]  (Normal) Collected: 08/27/24 0435    Specimen: Blood from Arm, Right Updated: 08/27/24 0533     Protime 11.1 Seconds      INR 1.02    CBC & Differential [[id number]]  (Normal) Collected: 08/27/24 0435    Specimen: Blood from Arm, Right Updated: 08/27/24 0516    Narrative:      The following orders  were created for panel order CBC & Differential.  Procedure                               Abnormality         Status                     ---------                               -----------         ------                     CBC Auto Differential[[id number]]        Normal              Final result                 Please view results for these tests on the individual orders.    CBC Auto Differential [[id number]]  (Normal) Collected: 08/27/24 0435    Specimen: Blood from Arm, Right Updated: 08/27/24 0516     WBC 6.59 10*3/mm3      RBC 4.78 10*6/mm3      Hemoglobin 14.7 g/dL      Hematocrit 44.8 %      MCV 93.7 fL      MCH 30.8 pg      MCHC 32.8 g/dL      RDW 12.5 %      RDW-SD 42.5 fl      MPV 9.3 fL      Platelets 231 10*3/mm3      Neutrophil % 44.9 %      Lymphocyte % 38.8 %      Monocyte % 10.9 %      Eosinophil % 4.6 %      Basophil % 0.5 %      Immature Grans % 0.3 %      Neutrophils, Absolute 2.96 10*3/mm3      Lymphocytes, Absolute 2.56 10*3/mm3      Monocytes, Absolute 0.72 10*3/mm3      Eosinophils, Absolute 0.30 10*3/mm3      Basophils, Absolute 0.03 10*3/mm3      Immature Grans, Absolute 0.02 10*3/mm3      nRBC 0.0 /100 WBC     Hemoglobin A1c [[id number]]  (Abnormal) Collected: 08/26/24 0342    Specimen: Blood from Arm, Left Updated: 08/26/24 1307     Hemoglobin A1C 6.08 %     Hepatic Function Panel [[id number]]  (Abnormal) Collected: 08/26/24 0342    Specimen: Blood from Arm, Left Updated: 08/26/24 0750     Total Protein 6.4 g/dL      Albumin 4.1 g/dL      ALT (SGPT) 48 U/L      AST (SGOT) 24 U/L      Alkaline Phosphatase 74 U/L      Total Bilirubin 0.2 mg/dL      Bilirubin, Direct 0.1 mg/dL      Bilirubin, Indirect 0.1 mg/dL     Basic Metabolic Panel [[id number]]  (Normal) Collected: 08/26/24 0342    Specimen: Blood from Arm, Left Updated: 08/26/24 0456     Glucose 85 mg/dL      BUN 15 mg/dL      Creatinine 0.84 mg/dL      Sodium 139 mmol/L      Potassium 4.2 mmol/L      Chloride 105 mmol/L      CO2 26.1 mmol/L       Calcium 8.8 mg/dL      BUN/Creatinine Ratio 17.9     Anion Gap 7.9 mmol/L      eGFR 112.4 mL/min/1.73     Narrative:      GFR Normal >60  Chronic Kidney Disease <60  Kidney Failure <15      Magnesium [[id number]]  (Normal) Collected: 08/26/24 0342    Specimen: Blood from Arm, Left Updated: 08/26/24 0456     Magnesium 2.3 mg/dL     CBC & Differential [[id number]]  (Abnormal) Collected: 08/26/24 0342    Specimen: Blood from Arm, Left Updated: 08/26/24 0441    Narrative:      The following orders were created for panel order CBC & Differential.  Procedure                               Abnormality         Status                     ---------                               -----------         ------                     CBC Auto Differential[[id number]]        Abnormal            Final result                 Please view results for these tests on the individual orders.    CBC Auto Differential [[id number]]  (Abnormal) Collected: 08/26/24 0342    Specimen: Blood from Arm, Left Updated: 08/26/24 0441     WBC 7.79 10*3/mm3      RBC 4.49 10*6/mm3      Hemoglobin 13.6 g/dL      Hematocrit 42.3 %      MCV 94.2 fL      MCH 30.3 pg      MCHC 32.2 g/dL      RDW 12.4 %      RDW-SD 43.3 fl      MPV 9.4 fL      Platelets 225 10*3/mm3      Neutrophil % 36.6 %      Lymphocyte % 49.8 %      Monocyte % 8.5 %      Eosinophil % 4.5 %      Basophil % 0.5 %      Immature Grans % 0.1 %      Neutrophils, Absolute 2.85 10*3/mm3      Lymphocytes, Absolute 3.88 10*3/mm3      Monocytes, Absolute 0.66 10*3/mm3      Eosinophils, Absolute 0.35 10*3/mm3      Basophils, Absolute 0.04 10*3/mm3      Immature Grans, Absolute 0.01 10*3/mm3      nRBC 0.0 /100 WBC     T4, Free [[id number]]  (Normal) Collected: 08/25/24 1350    Specimen: Blood Updated: 08/26/24 0045     Free T4 0.99 ng/dL     Lipid Panel [[id number]]  (Abnormal) Collected: 08/25/24 1350    Specimen: Blood Updated: 08/25/24 1531     Total Cholesterol 179 mg/dL      Triglycerides 190 mg/dL       HDL Cholesterol 33 mg/dL      LDL Cholesterol  112 mg/dL      VLDL Cholesterol 34 mg/dL      LDL/HDL Ratio 3.27    Narrative:      Cholesterol Reference Ranges  (U.S. Department of Health and Human Services ATP III Classifications)    Desirable          <200 mg/dL  Borderline High    200-239 mg/dL  High Risk          >240 mg/dL      Triglyceride Reference Ranges  (U.S. Department of Health and Human Services ATP III Classifications)    Normal           <150 mg/dL  Borderline High  150-199 mg/dL  High             200-499 mg/dL  Very High        >500 mg/dL    HDL Reference Ranges  (U.S. Department of Health and Human Services ATP III Classifications)    Low     <40 mg/dl (major risk factor for CHD)  High    >60 mg/dl ('negative' risk factor for CHD)        LDL Reference Ranges  (U.S. Department of Health and Human Services ATP III Classifications)    Optimal          <100 mg/dL  Near Optimal     100-129 mg/dL  Borderline High  130-159 mg/dL  High             160-189 mg/dL  Very High        >189 mg/dL    High Sensitivity Troponin T 2Hr [[id number]] Collected: 08/25/24 1350    Specimen: Blood Updated: 08/25/24 1419     HS Troponin T 7 ng/L      Troponin T Delta --     Comment: Unable to calculate.       Narrative:      High Sensitive Troponin T Reference Range:  <14.0 ng/L- Negative Female for AMI  <22.0 ng/L- Negative Male for AMI  >=14 - Abnormal Female indicating possible myocardial injury.  >=22 - Abnormal Male indicating possible myocardial injury.   Clinicians would have to utilize clinical acumen, EKG, Troponin, and serial changes to determine if it is an Acute Myocardial Infarction or myocardial injury due to an underlying chronic condition.         Respiratory Panel PCR w/COVID-19(SARS-CoV-2) FELISA/GREG/HAN/PAD/COR/RICHARD In-House, NP Swab in UTM/VTM, 2 HR TAT - Swab, Nasopharynx [[id number]]  (Normal) Collected: 08/25/24 1303    Specimen: Swab from Nasopharynx Updated: 08/25/24 1403     ADENOVIRUS, PCR Not Detected      Coronavirus 229E Not Detected     Coronavirus HKU1 Not Detected     Coronavirus NL63 Not Detected     Coronavirus OC43 Not Detected     COVID19 Not Detected     Human Metapneumovirus Not Detected     Human Rhinovirus/Enterovirus Not Detected     Influenza A PCR Not Detected     Influenza B PCR Not Detected     Parainfluenza Virus 1 Not Detected     Parainfluenza Virus 2 Not Detected     Parainfluenza Virus 3 Not Detected     Parainfluenza Virus 4 Not Detected     RSV, PCR Not Detected     Bordetella pertussis pcr Not Detected     Bordetella parapertussis PCR Not Detected     Chlamydophila pneumoniae PCR Not Detected     Mycoplasma pneumo by PCR Not Detected    Narrative:      In the setting of a positive respiratory panel with a viral infection PLUS a negative procalcitonin without other underlying concern for bacterial infection, consider observing off antibiotics or discontinuation of antibiotics and continue supportive care. If the respiratory panel is positive for atypical bacterial infection (Bordetella pertussis, Chlamydophila pneumoniae, or Mycoplasma pneumoniae), consider antibiotic de-escalation to target atypical bacterial infection.    Urinalysis, Microscopic Only - Urine, Clean Catch [[id number]] Collected: 08/25/24 1332    Specimen: Urine, Clean Catch Updated: 08/25/24 1359     RBC, UA None Seen /HPF      WBC, UA 0-2 /HPF      Bacteria, UA None Seen /HPF      Squamous Epithelial Cells, UA None Seen /HPF      Hyaline Casts, UA None Seen /LPF      Amorphous Crystals, UA Small/1+ /HPF      Methodology Manual Light Microscopy    Urinalysis With Microscopic If Indicated (No Culture) - Urine, Clean Catch [[id number]]  (Abnormal) Collected: 08/25/24 1332    Specimen: Urine, Clean Catch Updated: 08/25/24 1347     Color, UA Yellow     Appearance, UA Clear     pH, UA 6.5     Specific Gravity, UA 1.024     Glucose, UA Negative     Ketones, UA Trace     Bilirubin, UA Negative     Blood, UA Negative     Protein, UA  Negative     Leuk Esterase, UA Trace     Nitrite, UA Negative     Urobilinogen, UA 1.0 E.U./dL    Comprehensive Metabolic Panel [[id number]]  (Abnormal) Collected: 08/25/24 1130    Specimen: Blood Updated: 08/25/24 1256     Glucose 101 mg/dL      BUN 17 mg/dL      Creatinine 0.99 mg/dL      Sodium 139 mmol/L      Potassium 4.1 mmol/L      Comment: Slight hemolysis detected by analyzer. Result may be falsely elevated.        Chloride 103 mmol/L      CO2 26.8 mmol/L      Calcium 9.8 mg/dL      Total Protein 7.5 g/dL      Albumin 4.6 g/dL      ALT (SGPT) 60 U/L      AST (SGOT) 30 U/L      Alkaline Phosphatase 88 U/L      Total Bilirubin 0.3 mg/dL      Globulin 2.9 gm/dL      A/G Ratio 1.6 g/dL      BUN/Creatinine Ratio 17.2     Anion Gap 9.2 mmol/L      eGFR 98.1 mL/min/1.73     Narrative:      GFR Normal >60  Chronic Kidney Disease <60  Kidney Failure <15      BNP [[id number]]  (Normal) Collected: 08/25/24 1130    Specimen: Blood Updated: 08/25/24 1251     proBNP <36.0 pg/mL     Narrative:      This assay is used as an aid in the diagnosis of individuals suspected of having heart failure. It can be used as an aid in the diagnosis of acute decompensated heart failure (ADHF) in patients presenting with signs and symptoms of ADHF to the emergency department (ED). In addition, NT-proBNP of <300 pg/mL indicates ADHF is not likely.    Age Range Result Interpretation  NT-proBNP Concentration (pg/mL:      <50             Positive            >450                   Gray                 300-450                    Negative             <300    50-75           Positive            >900                  Gray                300-900                  Negative            <300      >75             Positive            >1800                  Gray                300-1800                  Negative            <300    High Sensitivity Troponin T [[id number]]  (Normal) Collected: 08/25/24 1130    Specimen: Blood Updated: 08/25/24 1251     HS  "Troponin T <6 ng/L     Narrative:      High Sensitive Troponin T Reference Range:  <14.0 ng/L- Negative Female for AMI  <22.0 ng/L- Negative Male for AMI  >=14 - Abnormal Female indicating possible myocardial injury.  >=22 - Abnormal Male indicating possible myocardial injury.   Clinicians would have to utilize clinical acumen, EKG, Troponin, and serial changes to determine if it is an Acute Myocardial Infarction or myocardial injury due to an underlying chronic condition.         TSH [[id number]]  (Normal) Collected: 08/25/24 1130    Specimen: Blood Updated: 08/25/24 1251     TSH 1.090 uIU/mL     D-dimer, Quantitative [[id number]]  (Normal) Collected: 08/25/24 1130    Specimen: Blood Updated: 08/25/24 1246     D-Dimer, Quantitative <0.19 mg/L (FEU)     Narrative:      According to the assay 's published package insert, a normal (<0.50 mg/L (FEU)) D-dimer result in conjunction with a non-high clinical probability assessment, excludes deep vein thrombosis (DVT) and pulmonary embolism (PE) with high sensitivity.    D-dimer values increase with age and this can make VTE exclusion of an older population difficult. To address this, the [employer], based on best available evidence and recent guidelines, recommends that clinicians use age-adjusted D-dimer thresholds in patients greater than 50 years of age with: a) a low probability of PE who do not meet all Pulmonary Embolism Rule Out Criteria, or b) in those with intermediate probability of PE.   The formula for an age-adjusted D-dimer cut-off is \"age/100\".  For example, a 60 year old patient would have an age-adjusted cut-off of 0.60 mg/L (FEU) and an 80 year old 0.80 mg/L (FEU).    [facility] Draw [[id number]] Collected: 08/25/24 1130    Specimen: Blood Updated: 08/25/24 1145    Narrative:      The following orders were created for panel order [facility] Draw.  Procedure                               Abnormality         Status                   "   ---------                               -----------         ------                     Green Top (Gel)[[id number]]                                  Final result               Lavender Top[[id number]]                                     Final result               Gold Top - SST[[id number]]                                   Final result               Light Blue Top[[id number]]                                   Final result                 Please view results for these tests on the individual orders.    Green Top (Gel) [[id number]] Collected: 08/25/24 1130    Specimen: Blood Updated: 08/25/24 1145     Extra Tube Hold for add-ons.     Comment: Auto resulted.       Lavender Top [[id number]] Collected: 08/25/24 1130    Specimen: Blood Updated: 08/25/24 1145     Extra Tube hold for add-on     Comment: Auto resulted       Gold Top - SST [[id number]] Collected: 08/25/24 1130    Specimen: Blood Updated: 08/25/24 1145     Extra Tube Hold for add-ons.     Comment: Auto resulted.       Light Blue Top [[id number]] Collected: 08/25/24 1130    Specimen: Blood Updated: 08/25/24 1145     Extra Tube Hold for add-ons.     Comment: Auto resulted               Imaging Results (Most Recent)       Procedure Component Value Units Date/Time    XR Chest 2 View [[id number]] Collected: 08/25/24 1253     Updated: 08/25/24 1256    Narrative:      XR CHEST 2 VW    Date of Exam: 8/25/2024 12:43 PM EDT    Indication: CHF/COPD Protocol  CHF/COPD Protocol    Comparison: Chest radiograph 2/4/2024    Findings:      Mediastinum: Cardiomediastinal silhouette appears unchanged and normal    Lungs: The lungs are clear.    Pleura: No pleural effusion or pneumothorax.    Bones and soft tissues: No acute osseous abnormality.        Impression:      Impression:  No acute intrathoracic finding.      Electronically Signed: Brendan Maldonado    8/25/2024 12:54 PM EDT    Workstation ID: [id number]          reviewed    ECG/EMG Results (most recent)       Procedure Component  Value Units Date/Time    Telemetry Scan [[id number]] Resulted: 08/25/24     Updated: 08/25/24 2115    Telemetry Scan [[id number]] Resulted: 08/25/24     Updated: 08/26/24 0035    ECG 12 Lead Chest Pain [[id number]] Collected: 08/25/24 1119     Updated: 08/26/24 0624     QT Interval 351 ms      QTC Interval 377 ms     Narrative:      HEART RATE=69  bpm  RR Mcbmgiix=157  ms  SC Yttavrrw=268  ms  P Horizontal Axis=11  deg  P Front Axis=32  deg  QRSD Interval=92  ms  QT Yceximss=215  ms  BFtN=064  ms  QRS Axis=27  deg  T Wave Axis=68  deg  - NORMAL ECG -  Sinus rhythm  When compared with ECG of 04-Feb-2024 20:58:25,  Significant rate decrease  Electronically Signed By: Jm Francisco (HAN) 2024-08-26 06:23:26  Date and Time of Study:2024-08-25 11:19:35    Telemetry Scan [[id number]] Resulted: 08/25/24     Updated: 08/26/24 1132    Adult Transthoracic Echo Complete W/ Cont if Necessary Per Protocol [[id number]] Resulted: 08/26/24 1337     Updated: 08/26/24 1342     LVIDd 5.1 cm      LVIDs 3.3 cm      IVSd 1.10 cm      LVPWd 1.20 cm      FS 35.3 %      IVS/LVPW 0.92 cm      ESV(cubed) 35.9 ml      LV Sys Vol (BSA corrected) 21.4 cm2      EDV(cubed) 132.7 ml      LV Heck Vol (BSA corrected) 50.8 cm2      LV mass(C)d 227.4 grams      LVOT area 3.5 cm2      LVOT diam 2.10 cm      EDV(MOD-sp4) 123.0 ml      ESV(MOD-sp4) 51.9 ml      SV(MOD-sp4) 71.1 ml      SVi(MOD-SP4) 29.4 ml/m2      SVi (LVOT) 22.5 ml/m2      EF(MOD-sp4) 57.8 %      MV E max justino 122.0 cm/sec      MV A max justino 70.2 cm/sec      MV dec time 0.14 sec      MV E/A 1.74     LA ESV Index (BP) 24.3 ml/m2      Med Peak E' Justino 9.0 cm/sec      Lat Peak E' Justino 12.6 cm/sec      TR max justino 273.0 cm/sec      Avg E/e' ratio 11.30     SV(LVOT) 54.4 ml      TAPSE (>1.6) 2.03 cm      RV S' 11.7 cm/sec      LA dimension (2D)  4.1 cm      LV V1 max 79.2 cm/sec      LV V1 max PG 2.5 mmHg      LV V1 mean PG 1.00 mmHg      LV V1 VTI 15.7 cm      Ao pk justino 163.0 cm/sec      Ao max PG 10.6  mmHg      Ao mean PG 6.0 mmHg      Ao V2 VTI 29.6 cm      NAIN(I,D) 1.84 cm2      MV max PG 4.9 mmHg      MV mean PG 2.00 mmHg      MV V2 VTI 31.9 cm      MV P1/2t 62.0 msec      MVA(P1/2t) 3.5 cm2      MVA(VTI) 1.70 cm2      MV dec slope 534.0 cm/sec2      MR max angela 508.0 cm/sec      MR max .2 mmHg      TR max PG 29.8 mmHg      RVSP(TR) 37.8 mmHg      RAP systole 8.0 mmHg      RV V1 max PG 1.14 mmHg      RV V1 max 53.5 cm/sec      RV V1 VTI 9.5 cm      PA V2 max 129.0 cm/sec      PI end-d angela 85.6 cm/sec      Ao root diam 3.7 cm      ACS 2.40 cm      Sinus 3.3 cm      STJ 2.6 cm      EF(MOD-bp) 58.0 %     Narrative:        Left ventricular ejection fraction appears to be 56 - 60%.    Estimated right ventricular systolic pressure from tricuspid   regurgitation is mildly elevated (35-45 mmHg).    Indications  Chest pain  Palpitations    Technically satisfactory study.  Mitral valve is structurally normal.  Tricuspid valve is structurally normal.  Mild tricuspid regurgitation is   present.  Aortic valve is structurally normal.  Pulmonic valve could not be well visualized.  Minimal pulmonic   regurgitation is present.  Mild pulmonary hypertension is present.  Left atrium is mildly enlarged.  Right atrium is normal in size.  Left ventricle is normal in size and contractility with ejection fraction   of 60%.  Grade 2 left ventricular diastolic dysfunction is present.  Right ventricle is normal in size and contractility with TAPSE of 2.03 cm.  Atrial septum is intact.  Aorta is normal.  No pericardial effusion or intracardiac thrombus is seen.    Impression  Structurally and functionally normal cardiac valves except for mild   tricuspid and minimal pulmonic regurgitation.  Mild left atrial enlargement..  Left ventricular size and contractility is normal with ejection fraction   of 60%.  Grade 2 left ventricular diastolic dysfunction is present.  Mild pulmonary hypertension is present.  (38 mmHg.)    Telemetry Scan  [[id number]] Resulted: 08/25/24     Updated: 08/26/24 1348    Telemetry Scan [[id number]] Resulted: 08/25/24     Updated: 08/26/24 1348    Telemetry Scan [[id number]] Resulted: 08/25/24     Updated: 08/26/24 1348    Telemetry Scan [[id number]] Resulted: 08/25/24     Updated: 08/27/24 0854    Telemetry Scan [[id number]] Resulted: 08/25/24     Updated: 08/27/24 0854    Telemetry Scan [[id number]] Resulted: 08/25/24     Updated: 08/27/24 0942          reviewed        Results for orders placed during the hospital encounter of 08/25/24    Adult Transthoracic Echo Complete W/ Cont if Necessary Per Protocol    Interpretation Summary    Left ventricular ejection fraction appears to be 56 - 60%.    Estimated right ventricular systolic pressure from tricuspid regurgitation is mildly elevated (35-45 mmHg).    Indications  Chest pain  Palpitations    Technically satisfactory study.  Mitral valve is structurally normal.  Tricuspid valve is structurally normal.  Mild tricuspid regurgitation is present.  Aortic valve is structurally normal.  Pulmonic valve could not be well visualized.  Minimal pulmonic regurgitation is present.  Mild pulmonary hypertension is present.  Left atrium is mildly enlarged.  Right atrium is normal in size.  Left ventricle is normal in size and contractility with ejection fraction of 60%.  Grade 2 left ventricular diastolic dysfunction is present.  Right ventricle is normal in size and contractility with TAPSE of 2.03 cm.  Atrial septum is intact.  Aorta is normal.  No pericardial effusion or intracardiac thrombus is seen.    Impression  Structurally and functionally normal cardiac valves except for mild tricuspid and minimal pulmonic regurgitation.  Mild left atrial enlargement..  Left ventricular size and contractility is normal with ejection fraction of 60%.  Grade 2 left ventricular diastolic dysfunction is present.  Mild pulmonary hypertension is present.  (38 mmHg.)      Microbiology Results (last 10  days)       Procedure Component Value - Date/Time    Respiratory Panel PCR w/COVID-19(SARS-CoV-2) FELISA/GREG/HAN/PAD/COR/RICHARD In-House, NP Swab in UTM/VTM, 2 HR TAT - Swab, Nasopharynx [[id number]]  (Normal) Collected: 08/25/24 1303    Lab Status: Final result Specimen: Swab from Nasopharynx Updated: 08/25/24 1403     ADENOVIRUS, PCR Not Detected     Coronavirus 229E Not Detected     Coronavirus HKU1 Not Detected     Coronavirus NL63 Not Detected     Coronavirus OC43 Not Detected     COVID19 Not Detected     Human Metapneumovirus Not Detected     Human Rhinovirus/Enterovirus Not Detected     Influenza A PCR Not Detected     Influenza B PCR Not Detected     Parainfluenza Virus 1 Not Detected     Parainfluenza Virus 2 Not Detected     Parainfluenza Virus 3 Not Detected     Parainfluenza Virus 4 Not Detected     RSV, PCR Not Detected     Bordetella pertussis pcr Not Detected     Bordetella parapertussis PCR Not Detected     Chlamydophila pneumoniae PCR Not Detected     Mycoplasma pneumo by PCR Not Detected    Narrative:      In the setting of a positive respiratory panel with a viral infection PLUS a negative procalcitonin without other underlying concern for bacterial infection, consider observing off antibiotics or discontinuation of antibiotics and continue supportive care. If the respiratory panel is positive for atypical bacterial infection (Bordetella pertussis, Chlamydophila pneumoniae, or Mycoplasma pneumoniae), consider antibiotic de-escalation to target atypical bacterial infection.            Assessment & Plan     Chest pain    Chest discomfort    Abnormal nuclear stress test        Chest pain        Lab Results   Component Value Date     TROPONINT 7 08/25/2024     TROPONINT <6 08/25/2024     TROPONINT 8 02/04/2024   -BNP less than 36  -Chest X-ray: No acute findings  -EKG: Sinus rhythm  -UA: Trace ketones, continue IV fluids  -Stress Test: Mild post lateral and anterior uptake suggestive of infarct, EF  67%  -Echocardiogram ordered  -Telemetry  -Continue aspirin  -Respiratory panel negative   -Cardiology consulted with planned C on 8/27/24     Hyperlipidemia  -Continue statin   -Total cholesterol 179, HDL 33, , triglycerides 190        Tobacco dependence  -Nicotine patch  -Cessation encouraged    I discussed the patients findings and my recommendations with patient.     Discharge Diagnosis:      Chest pain    Chest discomfort    Abnormal nuclear stress test      Hospital Course  Patient is a 39 y.o. male presented with chest pain.  Troponin less than 6 and 7.  EKG shows sinus rhythm without acute ST wave changes.  Chest x-ray showed no acute cardiopulmonary findings.  Urinalysis showed trace ketones and continued IV fluids.  Stress that showed mild post lateral anterior decreased uptake partial redistribution EF of 67%.  Therefore patient was eval by cardiology underwent cardiac catheterization which showed normal arteries.  Echocardiogram showed mild tricuspid and minimal pulmonic regurgitation, mild left atrial enlargement, grade 2 diastolic function, pulmonary hypertension, and with a EF of 60%.  Patient continued on statin.  Patient encouraged to sustain smoking given nicotine patches.  Patient to monitor blood pressure at home and take medication as prescribed.  Patient to follow-up with cardiology at scheduled appointment.  Testing recommendations reviewed with patient and he agreed to treatment plan.  If symptoms worsen patient to call 9 1 or go to nearest ED.    Past Medical History:     Past Medical History:   Diagnosis Date    Abdominal pain     Agitation     Allergies     Apnea     Change in weight     Chest congestion     Choking     Cough     GERD (gastroesophageal reflux disease)     High blood pressure     Obesity     Sleep disturbance     Snoring     Wheezing        Past Surgical History:     Past Surgical History:   Procedure Laterality Date    CARDIAC CATHETERIZATION  08/27/2024    NORMAL  LEFT AND RIGHT HEART CATH WITH DR LANE    CLEFT PALATE REPAIR      FOOT ARTHROPLASTY Left 2020    SINUS SURGERY      TONSILLECTOMY         Social History:   Social History     Socioeconomic History    Marital status: Single   Tobacco Use    Smoking status: Every Day     Current packs/day: 0.50     Types: Cigarettes     Passive exposure: Current    Smokeless tobacco: Never   Vaping Use    Vaping status: Never Used   Substance and Sexual Activity    Alcohol use: Yes     Comment: 5 beers every other week    Drug use: Never    Sexual activity: Defer       Procedures Performed    Procedure(s):  Left Heart Cath and coronary angiogram  -------------------       Consults:   Consults       Date and Time Order Name Status Description    8/26/2024 10:59 AM Inpatient Cardiology Consult              Condition on Discharge:     Stable    Discharge Disposition  Home or Self Care    Discharge Medications     Discharge Medications        New Medications        Instructions Start Date   aspirin 81 MG chewable tablet   81 mg, Oral, Daily      losartan 25 MG tablet  Commonly known as: COZAAR   25 mg, Oral, Every 24 Hours Scheduled      nicotine 14 MG/24HR patch  Commonly known as: NICODERM CQ   1 patch, Transdermal, Every 24 Hours Scheduled   Start Date: August 28, 2024            Continue These Medications        Instructions Start Date   rosuvastatin 10 MG tablet  Commonly known as: CRESTOR   10 mg, Oral, Nightly               Discharge Diet:   Diet Instructions       Diet: Cardiac Diets; Low Sodium (2g); Regular (IDDSI 7); Thin (IDDSI 0)      Discharge Diet: Cardiac Diets    Cardiac Diet: Low Sodium (2g)    Texture: Regular (IDDSI 7)    Fluid Consistency: Thin (IDDSI 0)            Activity at Discharge:   Activity Instructions       Activity as Tolerated      Measure Blood Pressure              Follow-up Appointments  No future appointments.  Additional Instructions for the Follow-ups that You Need to Schedule       Discharge  Follow-up with PCP   As directed       Currently Documented PCP:    Arturo Ac MD    PCP Phone Number:    [phone]     Follow Up Details: 7-10 days                Test Results Pending at Discharge       Risk for Readmission (LACE) Score: 6 (8/27/2024  6:00 AM)          Brit GuamanELIEL  08/27/24  10:19 EDT        I spent 35 minutes caring for Caleb on this date of service. This time includes time spent by me in the following activities: reviewing tests, performing a medically appropriate examination and/or evaluation, counseling and educating the patient/family/caregiver, referring and communicating with other health care professionals, documenting information in the medical record, independently interpreting results and communicating that information with the patient/family/caregiver, care coordination, ordering medications, ordering test(s), ordering procedure(s), obtaining a separately obtained history, and reviewing a separately obtained history.

## 2024-08-27 NOTE — OUTREACH NOTE
Prep Survey      Flowsheet Row Responses   [facility] patient discharged from? [facility]   Is LACE score < 7 ? Yes   Eligibility [facility]   Date of Admission 08/25/24   Date of Discharge 08/27/24   Discharge Disposition Home or Self Care   Discharge diagnosis Chest pain-Left heart cath this visit   Does the patient have one of the following disease processes/diagnoses(primary or secondary)? Other   Does the patient have Home health ordered? No   Is there a DME ordered? No   Prep survey completed? Yes            NATIVIDAD AMADOR - Registered Nurse

## 2024-08-27 NOTE — PROGRESS NOTES
Referring Provider: Jm Francisco MD    Reason for follow-up:  Chest pain  Positive stress Cardiolite test     Patient Care Team:  Arturo Ac MD as PCP - General (Internal Medicine)    Subjective .      ROS    Today, the patient has been without any chest discomfort ,shortness of breath, palpitations, dizziness or syncope.  Denies having any headache ,abdominal pain ,nausea, vomiting , diarrhea constipation, loss of weight or loss of appetite.  Denies having any excessive bruising ,hematuria or blood in the stool.    Review of all systems negative except as indicated    History  Past Medical History:   Diagnosis Date    Abdominal pain     Agitation     Allergies     Apnea     Change in weight     Chest congestion     Choking     Cough     GERD (gastroesophageal reflux disease)     High blood pressure     Obesity     Sleep disturbance     Snoring     Wheezing        Past Surgical History:   Procedure Laterality Date    CLEFT PALATE REPAIR      FOOT ARTHROPLASTY Left 2020    SINUS SURGERY      TONSILLECTOMY         Family History   Problem Relation Age of Onset    No Known Problems Mother     No Known Problems Father     No Known Problems Brother        Social History     Tobacco Use    Smoking status: Every Day     Current packs/day: 0.50     Types: Cigarettes     Passive exposure: Current    Smokeless tobacco: Never   Vaping Use    Vaping status: Never Used   Substance Use Topics    Alcohol use: Yes     Comment: 5 beers every other week    Drug use: Never        Medications Prior to Admission   Medication Sig Dispense Refill Last Dose    rosuvastatin (CRESTOR) 10 MG tablet Take 1 tablet by mouth Every Night.   8/24/2024       Allergies  Albuterol sulfate, Oseltamivir, Atorvastatin, Latex, Latex, and Tamiflu [oseltamivir phosphate]    Scheduled Meds:aspirin, 81 mg, Oral, Daily  enoxaparin, 40 mg, Subcutaneous, Daily  losartan, 25 mg, Oral, Q24H  nicotine, 1 patch, Transdermal, Q24H  rosuvastatin, 10 mg, Oral,  "Nightly  sodium chloride, 10 mL, Intravenous, Q12H  sodium chloride, 3 mL, Intravenous, Q12H      Continuous Infusions:   PRN Meds:.  aluminum-magnesium hydroxide-simethicone    senna-docusate sodium **AND** polyethylene glycol **AND** bisacodyl **AND** bisacodyl    Calcium Replacement - Follow Nurse / BPA Driven Protocol    hydrALAZINE    HYDROcodone-acetaminophen    LORazepam    Magnesium Standard Dose Replacement - Follow Nurse / BPA Driven Protocol    melatonin    nitroglycerin    ondansetron ODT **OR** ondansetron    Phosphorus Replacement - Follow Nurse / BPA Driven Protocol    Potassium Replacement - Follow Nurse / BPA Driven Protocol    sodium chloride    sodium chloride    sodium chloride    sodium chloride    sodium chloride    sodium chloride    Objective     VITAL SIGNS  Vitals:    08/26/24 1419 08/26/24 1729 08/26/24 2201 08/27/24 0302   BP: 171/80 152/88 156/94 (!) 141/111   BP Location: Right arm Right arm Right arm Right arm   Patient Position: Lying Lying Lying Lying   Pulse: 75 95 68 67   Resp: 20 19 17 17   Temp: 97.3 °F (36.3 °C) 97.5 °F (36.4 °C) 98.1 °F (36.7 °C) 98 °F (36.7 °C)   TempSrc: Oral Oral Oral Oral   SpO2: 96% 97% 97% 98%   Weight:       Height:           Flowsheet Rows      Flowsheet Row First Filed Value   Admission Height 180.3 cm (71\") Documented at 08/25/2024 1117   Admission Weight 126 kg (277 lb 9 oz) Documented at 08/25/2024 1117              Intake/Output Summary (Last 24 hours) at 8/27/2024 0521  Last data filed at 8/26/2024 1729  Gross per 24 hour   Intake 600 ml   Output 3 ml   Net 597 ml        TELEMETRY: Sinus rhythm    Physical Exam:  The patient is alert, oriented and in no distress.  Vital signs as noted above.  Exogenous obesity.  Head and neck revealed no carotid bruits or jugular venous distention.  No thyromegaly or lymphadenopathy is present  Lungs clear.  No wheezing.  Breath sounds are normal bilaterally.  Heart normal first and second heart sounds.  No " murmur. No precordial rub is present.  No gallop is present.  Abdomen soft and nontender.  No organomegaly is present.  Extremities with good peripheral pulses without any pedal edema.  Skin warm and dry.  Musculoskeletal system is grossly normal  CNS grossly normal    Reviewed and updated.    Results Review:   I reviewed the patient's new clinical results.  Lab Results (last 24 hours)       Procedure Component Value Units Date/Time    CBC & Differential [[id number]]  (Normal) Collected: 08/27/24 0435    Specimen: Blood from Arm, Right Updated: 08/27/24 0516    Narrative:      The following orders were created for panel order CBC & Differential.  Procedure                               Abnormality         Status                     ---------                               -----------         ------                     CBC Auto Differential[[id number]]        Normal              Final result                 Please view results for these tests on the individual orders.    CBC Auto Differential [[id number]]  (Normal) Collected: 08/27/24 0435    Specimen: Blood from Arm, Right Updated: 08/27/24 0516     WBC 6.59 10*3/mm3      RBC 4.78 10*6/mm3      Hemoglobin 14.7 g/dL      Hematocrit 44.8 %      MCV 93.7 fL      MCH 30.8 pg      MCHC 32.8 g/dL      RDW 12.5 %      RDW-SD 42.5 fl      MPV 9.3 fL      Platelets 231 10*3/mm3      Neutrophil % 44.9 %      Lymphocyte % 38.8 %      Monocyte % 10.9 %      Eosinophil % 4.6 %      Basophil % 0.5 %      Immature Grans % 0.3 %      Neutrophils, Absolute 2.96 10*3/mm3      Lymphocytes, Absolute 2.56 10*3/mm3      Monocytes, Absolute 0.72 10*3/mm3      Eosinophils, Absolute 0.30 10*3/mm3      Basophils, Absolute 0.03 10*3/mm3      Immature Grans, Absolute 0.02 10*3/mm3      nRBC 0.0 /100 WBC     Protime-INR [[id number]] Collected: 08/27/24 0435    Specimen: Blood from Arm, Right Updated: 08/27/24 0512    Basic Metabolic Panel [[id number]] Collected: 08/27/24 0435    Specimen: Blood from  Arm, Right Updated: 08/27/24 0512    Magnesium [[id number]] Collected: 08/27/24 0435    Specimen: Blood from Arm, Right Updated: 08/27/24 0512    Hemoglobin A1c [[id number]]  (Abnormal) Collected: 08/26/24 0342    Specimen: Blood from Arm, Left Updated: 08/26/24 1307     Hemoglobin A1C 6.08 %     Hepatic Function Panel [[id number]]  (Abnormal) Collected: 08/26/24 0342    Specimen: Blood from Arm, Left Updated: 08/26/24 0750     Total Protein 6.4 g/dL      Albumin 4.1 g/dL      ALT (SGPT) 48 U/L      AST (SGOT) 24 U/L      Alkaline Phosphatase 74 U/L      Total Bilirubin 0.2 mg/dL      Bilirubin, Direct 0.1 mg/dL      Bilirubin, Indirect 0.1 mg/dL             Imaging Results (Last 24 Hours)       ** No results found for the last 24 hours. **        LAB RESULTS (LAST 7 DAYS)    CBC  Results from last 7 days   Lab Units 08/27/24  0435 08/26/24  0342 08/25/24  1130   WBC 10*3/mm3 6.59 7.79 5.55   RBC 10*6/mm3 4.78 4.49 5.07   HEMOGLOBIN g/dL 14.7 13.6 15.5   HEMATOCRIT % 44.8 42.3 47.5   MCV fL 93.7 94.2 93.7   PLATELETS 10*3/mm3 231 225 253       BMP  Results from last 7 days   Lab Units 08/26/24  0342 08/25/24  1130   SODIUM mmol/L 139 139   POTASSIUM mmol/L 4.2 4.1   CHLORIDE mmol/L 105 103   CO2 mmol/L 26.1 26.8   BUN mg/dL 15 17   CREATININE mg/dL 0.84 0.99   GLUCOSE mg/dL 85 101*   MAGNESIUM mg/dL 2.3 2.2       CMP   Results from last 7 days   Lab Units 08/26/24  0342 08/25/24  1130   SODIUM mmol/L 139 139   POTASSIUM mmol/L 4.2 4.1   CHLORIDE mmol/L 105 103   CO2 mmol/L 26.1 26.8   BUN mg/dL 15 17   CREATININE mg/dL 0.84 0.99   GLUCOSE mg/dL 85 101*   ALBUMIN g/dL 4.1 4.6   BILIRUBIN mg/dL 0.2 0.3   ALK PHOS U/L 74 88   AST (SGOT) U/L 24 30   ALT (SGPT) U/L 48* 60*         BNP        TROPONIN  Results from last 7 days   Lab Units 08/25/24  1350   HSTROP T ng/L 7       CoAg        Creatinine Clearance  Estimated Creatinine Clearance: 156.5 mL/min (by C-G formula based on SCr of 0.84 mg/dL).    ABG         Radiology  XR Chest 2 View    Result Date: 8/25/2024  Impression: No acute intrathoracic finding. Electronically Signed: Brendan Maldonado  8/25/2024 12:54 PM EDT  Workstation ID: [id number]         EKG            I personally viewed and interpreted the patient's EKG/Telemetry data:    ECHOCARDIOGRAM:    Results for orders placed during the hospital encounter of 08/25/24    Adult Transthoracic Echo Complete W/ Cont if Necessary Per Protocol    Interpretation Summary    Left ventricular ejection fraction appears to be 56 - 60%.    Estimated right ventricular systolic pressure from tricuspid regurgitation is mildly elevated (35-45 mmHg).    Indications  Chest pain  Palpitations    Technically satisfactory study.  Mitral valve is structurally normal.  Tricuspid valve is structurally normal.  Mild tricuspid regurgitation is present.  Aortic valve is structurally normal.  Pulmonic valve could not be well visualized.  Minimal pulmonic regurgitation is present.  Mild pulmonary hypertension is present.  Left atrium is mildly enlarged.  Right atrium is normal in size.  Left ventricle is normal in size and contractility with ejection fraction of 60%.  Grade 2 left ventricular diastolic dysfunction is present.  Right ventricle is normal in size and contractility with TAPSE of 2.03 cm.  Atrial septum is intact.  Aorta is normal.  No pericardial effusion or intracardiac thrombus is seen.    Impression  Structurally and functionally normal cardiac valves except for mild tricuspid and minimal pulmonic regurgitation.  Mild left atrial enlargement..  Left ventricular size and contractility is normal with ejection fraction of 60%.  Grade 2 left ventricular diastolic dysfunction is present.  Mild pulmonary hypertension is present.  (38 mmHg.)          STRESS TEST  Results for orders placed during the hospital encounter of 08/25/24    Stress Test With Myocardial Perfusion One Day    Interpretation Summary    Low risk for ischemic heart  disease.    Indications  Chest pain    This study was performed under the direct supervision of Nivia BAXTER.    Resting ECG  Sinus rhythm    Patient exercised for 9 minutes using the Rikki protocol.  The maximum heart rate achieved was 155 and maximum systolic blood pressure 185.  Patient did not have any chest discomfort ST-T wave abnormalities or ectopy.    Cardiolite was used as an imaging agent.    Cardiolite images showed mild posterolateral and anterior decreased radionuclide uptake with partial redistribution.    Gated SPECT images revealed normal left ventricular size and contractility with ejection fraction of 67%.    Impression  ========    Good exercise tolerance.    Cardiolite images showed mild posterolateral and anterior decreased radionuclide uptake with partial redistribution suggestive of infarction and associated ischemia..    Gated SPECT images revealed normal left ventricular size and contractility with ejection fraction of 67%.        Cardiolite (Tc-99m sestamibi) stress test    CARDIAC CATHETERIZATION  No results found for this or any previous visit.                OTHER:         Assessment & Plan     Principal Problem:    Chest pain  Active Problems:    Chest discomfort    Abnormal nuclear stress test      Assessment & Plan  Caleb Vargas is a 39 y.o. male with no past cardiac medical history, hyperlipidemia, REGGIE on CPAP at night who presented to the ED with palpitations and chest pain. Patient reports that over the past week he has been experiencing palpitations and chest discomfort that extends up into his left shoulder. He reports its been progressively worse the past 3 days. Patient denies shortness of breath, dizziness, lightheadedness, or lower extremity edema.     Work up in the ED showed normal serial troponins, BNP within normal limits, CMP and CBC unremarkable, TSH WNL, Lipid panel elevated (, HDL 33, Trig 190)  CXR negative, EKG sinus with no ST changes  Myoview with mild  posterolateral and anterior decreased uptake with partial redistribution suggestive of infarction and associated ischemia      ]]]]]]]]]]]]]]]]]]]]]  Impression  ==========  -Chest discomfort-somewhat atypical.    EKG showed no acute changes.  Troponin levels are negative.  Abnormal stress Cardiolite test    Echocardiogram 8/26/2024 revealed  Structurally and functionally normal cardiac valves except for mild tricuspid and minimal pulmonic regurgitation.  Mild left atrial enlargement..  Left ventricular size and contractility is normal with ejection fraction of 60%.  Grade 2 left ventricular diastolic dysfunction is present.  Mild pulmonary hypertension is present.  (38 mmHg.)      - Hypertension dyslipidemia obstructive sleep apnea on CPAP prediabetes    - Smoker    - Intolerance to atorvastatin  Allergic to albuterol oseltamivir latex Tamiflu  Intolerance to atorvastatin oseltamivir albuterol latex Tamiflu  -on rosuvastatin  =====  Plan  =========  Patient is having chest discomfort which is not typical for angina pectoris.  Patient has multiple coronary risk factors.  Patient has significant anxiety issues.  Stress Cardiolite test is abnormal.    Echocardiogram 8/26/2024 revealed  Structurally and functionally normal cardiac valves except for mild tricuspid and minimal pulmonic regurgitation.  Mild left atrial enlargement..  Left ventricular size and contractility is normal with ejection fraction of 60%.  Grade 2 left ventricular diastolic dysfunction is present.  Mild pulmonary hypertension is present.  (38 mmHg.)    Patient was advised cardiac catheterization and coronary arteriography for definite evaluation of coronary artery status.  Risks and benefits pros and cons of the procedure including infection bleeding blood clot heart attack stroke allergic reaction to the dye renal dysfunction etc. were discussed.    Further plan will depend on patient's progress.    Reviewed and updated  8/27/2024.  [[[[[[[[[[[[[[[[[[[[[     Right and left heart catheterization 8/27/2024   No evidence for pulmonary hypertension is present.  Left ventricular size and contractility is normal with ejection fraction of 60%.  No mitral regurgitation was seen.    Left main coronary artery is normal.  Left anterior descending artery is normal.  Circumflex coronary artery is a codominant vessel and is normal.  Right coronary artery is a codominant vessel and is normal.    RECOMMENDATIONS:  Noncardiac workup        Theo Yoder MD  08/27/24  05:21 EDT

## 2024-08-27 NOTE — NURSING NOTE
After NP held pressure at groin site, site soft knot gone small bruising to side of gauze/tegaderm marked. Pulses remain palp. Per np ok for discharge. Educated patient and wife at bedside of bleeding precautions and if bruising gets bigger or knot develops any s/s of bleeding to hold pressure at site and call for ambulance. Patient and wife verbalized understanding

## 2024-08-27 NOTE — NURSING NOTE
Patient walked to desk and stated he was having some pain at his right groin sheath site. Patient back to room, right groin site firm and tender to touch, small hematoma noticed. Brit NP at bedside, pressure held at site. BP stable, no complaints of chest pain.

## 2024-08-27 NOTE — CASE MANAGEMENT/SOCIAL WORK
Case Management Discharge Note      Final Note: Routine home with family.         Selected Continued Care - Discharged on 8/27/2024 Admission date: 8/25/2024 - Discharge disposition: Home or Self Care          Transportation Services  Private: Car    Final Discharge Disposition Code: 01 - home or self-care

## 2024-08-28 ENCOUNTER — TELEPHONE (OUTPATIENT)
Dept: CARDIOLOGY | Facility: CLINIC | Age: 39
End: 2024-08-28
Payer: COMMERCIAL

## 2024-08-28 ENCOUNTER — TRANSITIONAL CARE MANAGEMENT TELEPHONE ENCOUNTER (OUTPATIENT)
Dept: CALL CENTER | Facility: HOSPITAL | Age: 39
End: 2024-08-28
Payer: COMMERCIAL

## 2024-08-28 ENCOUNTER — HOSPITAL ENCOUNTER (EMERGENCY)
Facility: HOSPITAL | Age: 39
Discharge: HOME | End: 2024-08-28
Attending: EMERGENCY MEDICINE
Payer: COMMERCIAL

## 2024-08-28 ENCOUNTER — APPOINTMENT (OUTPATIENT)
Dept: ULTRASOUND IMAGING | Facility: HOSPITAL | Age: 39
End: 2024-08-28
Payer: COMMERCIAL

## 2024-08-28 VITALS
HEART RATE: 77 BPM | OXYGEN SATURATION: 93 % | DIASTOLIC BLOOD PRESSURE: 61 MMHG | TEMPERATURE: 97.4 F | BODY MASS INDEX: 38.5 KG/M2 | WEIGHT: 275 LBS | HEIGHT: 71 IN | SYSTOLIC BLOOD PRESSURE: 106 MMHG | RESPIRATION RATE: 20 BRPM

## 2024-08-28 DIAGNOSIS — I97.630 POSTPROCEDURAL HEMATOMA OF A CIRCULATORY SYSTEM ORGAN OR STRUCTURE FOLLOWING A CARDIAC CATHETERIZATION: Primary | ICD-10-CM

## 2024-08-28 PROCEDURE — 96372 THER/PROPH/DIAG INJ SC/IM: CPT

## 2024-08-28 PROCEDURE — 76882 US LMTD JT/FCL EVL NVASC XTR: CPT

## 2024-08-28 PROCEDURE — 25010000002 HYDROMORPHONE 1 MG/ML SOLUTION: Performed by: EMERGENCY MEDICINE

## 2024-08-28 PROCEDURE — 63710000001 ONDANSETRON ODT 4 MG TABLET DISPERSIBLE: Performed by: EMERGENCY MEDICINE

## 2024-08-28 PROCEDURE — 99284 EMERGENCY DEPT VISIT MOD MDM: CPT

## 2024-08-28 RX ORDER — OXYCODONE AND ACETAMINOPHEN 5; 325 MG/1; MG/1
TABLET ORAL
Qty: 12 TABLET | Refills: 0 | Status: SHIPPED | OUTPATIENT
Start: 2024-08-28

## 2024-08-28 RX ORDER — OXYCODONE HYDROCHLORIDE 5 MG/1
5 TABLET ORAL EVERY 4 HOURS PRN
Status: DISCONTINUED | OUTPATIENT
Start: 2024-08-28 | End: 2024-08-29 | Stop reason: HOSPADM

## 2024-08-28 RX ORDER — ONDANSETRON 4 MG/1
4 TABLET, ORALLY DISINTEGRATING ORAL ONCE
Status: COMPLETED | OUTPATIENT
Start: 2024-08-28 | End: 2024-08-28

## 2024-08-28 RX ADMIN — ONDANSETRON 4 MG: 4 TABLET, ORALLY DISINTEGRATING ORAL at 20:24

## 2024-08-28 RX ADMIN — OXYCODONE HYDROCHLORIDE 5 MG: 5 TABLET ORAL at 22:31

## 2024-08-28 RX ADMIN — HYDROMORPHONE HYDROCHLORIDE 1 MG: 1 INJECTION, SOLUTION INTRAMUSCULAR; INTRAVENOUS; SUBCUTANEOUS at 20:22

## 2024-08-28 NOTE — ED NOTES
Pt had a cardiac craterization yesterday at 0815 and they went through right femoral artery. Pt is now having a dull pain in leg as well as numbness and tingling. Pt states the nurse told him before he left that if there was any extra bruising beyond the marked area of groin, he needs to come back. Around 1530, pt noticed bruising that is on both right and left side of dressing.

## 2024-08-28 NOTE — TELEPHONE ENCOUNTER
Heart cath 8/27/24.  He called in saying he had a warm, tingling sensation in right leg right around incision site. It has went away now, but wondering if he should be concerned? He said yesterday before leaving the hospital there was swelling at incision site and nurse applied pressure and pushed the swelling down. No swelling today.

## 2024-08-28 NOTE — OUTREACH NOTE
Call Center TCM Note      Flowsheet Row Responses   [facility] patient discharged from? [facility]   Does the patient have one of the following disease processes/diagnoses(primary or secondary)? Other   TCM attempt successful? Yes  [VR lists CHAD martin]   Call start time 1612   Call end time 1625   Discharge diagnosis Chest pain-Left heart cath this visit   Meds reviewed with patient/caregiver? Yes   Is the patient having any side effects they believe may be caused by any medication additions or changes? No   Does the patient have all medications ordered at discharge? Yes   Is the patient taking all medications as directed (includes completed medication regime)? Yes   Comments 8/29/2024  2:30 PM  HOSPITAL FOLLOW UP   Does the patient have an appointment with their PCP within 7-14 days of discharge? Yes   DME comments BiPAP at home, pt reports that he wears this nightly.   Comments R. groin site with dressing in place, bruising marked by staff prior to DC, pt reports. Pt denies having chest pain, pressure or SOA. Pt does report ongoing L. leg pain present prior to admission. Pt c/o mild dizziness today, he has plans to obtain BP monitor to keep record of his VS, pt reports.   Did the patient receive a copy of their discharge instructions? Yes   Nursing interventions Reviewed instructions with patient   What is the patient's perception of their health status since discharge? Improving   Is the patient/caregiver able to teach back signs and symptoms related to disease process for when to call PCP? Yes   Is the patient/caregiver able to teach back signs and symptoms related to disease process for when to call 911? Yes   Is the patient/caregiver able to teach back the hierarchy of who to call/visit for symptoms/problems? PCP, Specialist, Home health nurse, Urgent Care, ED, 911 Yes   TCM call completed? Yes   Call end time 1625            Erna Cabrera, SAHIL    8/28/2024, 16:25 EDT

## 2024-08-28 NOTE — TELEPHONE ENCOUNTER
Does not sound anything to be concerned about.  If he has further swelling bleeding etc. he can come to the ER and checked.

## 2024-08-29 ENCOUNTER — OFFICE VISIT (OUTPATIENT)
Dept: FAMILY MEDICINE CLINIC | Facility: CLINIC | Age: 39
End: 2024-08-29
Payer: COMMERCIAL

## 2024-08-29 VITALS
OXYGEN SATURATION: 97 % | WEIGHT: 278.5 LBS | DIASTOLIC BLOOD PRESSURE: 64 MMHG | HEIGHT: 71 IN | RESPIRATION RATE: 18 BRPM | SYSTOLIC BLOOD PRESSURE: 110 MMHG | HEART RATE: 92 BPM | BODY MASS INDEX: 38.99 KG/M2

## 2024-08-29 DIAGNOSIS — Z09 HOSPITAL DISCHARGE FOLLOW-UP: Primary | ICD-10-CM

## 2024-08-29 DIAGNOSIS — E66.01 MORBID (SEVERE) OBESITY DUE TO EXCESS CALORIES: ICD-10-CM

## 2024-08-29 DIAGNOSIS — R94.39 ABNORMAL NUCLEAR STRESS TEST: ICD-10-CM

## 2024-08-29 NOTE — PROGRESS NOTES
"Transitional Care Follow Up Visit  Subjective     Caleb Vargas is a 39 y.o. male who presents for a transitional care management visit.    Within 48 business hours after discharge our office contacted him via telephone to coordinate his care and needs.      I reviewed and discussed the details of that call along with the discharge summary, hospital problems, inpatient lab results, inpatient diagnostic studies, and consultation reports with Caleb.     Current outpatient and discharge medications have been reconciled for the patient.  Reviewed by: Arturo Ac MD          8/27/2024     5:45 PM   Date of TCM Phone Call   [facility]   Date of Admission 8/25/2024   Date of Discharge 8/27/2024   Discharge Disposition Home or Self Care     Risk for Readmission (LACE) Score: 6 (8/27/2024  6:00 AM)      History of Present Illness   Course During Hospital Stay: During his hospital stay he had a big workup for his chest pain including an EKG, stress test, cardiac cath.  It is noted that he had elevated blood pressure and started on losartan.  Cardiac cath was negative.  Did have an abnormal stress test and EKG.  Did have palpitations to start the chest pain and was lying down at his home.  He states he has been doing well since then hospital.  He had echo back due to concern for swelling in his right groin.  It was negative for ultrasound.     The following portions of the patient's history were reviewed and updated as appropriate: allergies, current medications, past family history, past medical history, past social history, past surgical history, and problem list.    Review of Systems    Objective   /64   Pulse 92   Resp 18   Ht 180.3 cm (71\")   Wt 126 kg (278 lb 8 oz)   SpO2 97%   BMI 38.84 kg/m²   Physical Exam  Vitals and nursing note reviewed.   Constitutional:       General: He is not in acute distress.     Appearance: Normal appearance.   Cardiovascular:      Rate and Rhythm: Normal rate " and regular rhythm.      Pulses: Normal pulses.      Heart sounds: Normal heart sounds. No murmur heard.  Pulmonary:      Effort: Pulmonary effort is normal. No respiratory distress.      Breath sounds: Normal breath sounds.   Abdominal:      General: Abdomen is flat. Bowel sounds are normal.      Palpations: Abdomen is soft.      Tenderness: There is no abdominal tenderness.   Musculoskeletal:      Right lower leg: No edema.      Left lower leg: No edema.      Comments: Swelling and bruising to the right groin inguinal area   Neurological:      Mental Status: He is alert and oriented to person, place, and time. Mental status is at baseline.   Psychiatric:         Mood and Affect: Mood normal.         Behavior: Behavior normal.         Assessment & Plan   Diagnoses and all orders for this visit:    1. Hospital discharge follow-up (Primary)    2. Abnormal nuclear stress test    3. Morbid (severe) obesity due to excess calories    Other orders  -     Semaglutide-Weight Management 0.25 MG/0.5ML solution auto-injector; Inject 0.5 mL under the skin into the appropriate area as directed 1 (One) Time Per Week.  Dispense: 2 mL; Refill: 0      He is here for hospital discharge follow-up.  Discussed changes in medication.  Discussed losartan is important for his blood pressure and should monitor.  Discussed that due to his risk factors including fatty liver disease, chest pain, BMI.  I am going to start him on Wegovy for weight loss.  He does have a high risk for future cardiac mortality.  ASCVD risk is 2.1%

## 2024-08-29 NOTE — DISCHARGE INSTRUCTIONS
Continue postcatheterization instructions  Specially avoid prolonged walking or standing for the next 3 days  Medication as directed  Follow-up with cardiologist or return for worsening symptoms  Keep an eye out for redness and any drainage from the puncture site

## 2024-08-29 NOTE — ED PROVIDER NOTES
Subjective   History of Present Illness  39-year-old male complaining of pain and swelling to his groin after having cardiac cath yesterday morning.  The patient reports no palpable vibration or expanding not.  He states there is extensive contusion there.  He reports no decrease in sensation or circulation.  He reports no chest pain or shortness of breath.  He reports that the pain has been uncontrolled by ibuprofen      Review of Systems   HENT:  Negative for nosebleeds.    Gastrointestinal:  Negative for anal bleeding and blood in stool.   Genitourinary:  Negative for hematuria.   Neurological:  Negative for numbness.   Hematological:  Does not bruise/bleed easily.   All other systems reviewed and are negative.      Past Medical History:   Diagnosis Date    Abdominal pain     Agitation     Allergies     Apnea     Change in weight     Chest congestion     Choking     Cough     GERD (gastroesophageal reflux disease)     High blood pressure     Obesity     Sleep disturbance     Snoring     Wheezing        Allergies   Allergen Reactions    Albuterol Sulfate Shortness Of Breath    Oseltamivir Nausea And Vomiting, GI Intolerance and Unknown (See Comments)    Atorvastatin Unknown - Low Severity    Latex Hives    Latex Rash    Tamiflu [Oseltamivir Phosphate] Nausea And Vomiting       Past Surgical History:   Procedure Laterality Date    CARDIAC CATHETERIZATION  08/27/2024    NORMAL LEFT AND RIGHT HEART CATH WITH DR LANE    CARDIAC CATHETERIZATION N/A 8/27/2024    Procedure: Left Heart Cath and coronary angiogram;  Surgeon: Theo Lane MD;  Location: [facility] CATH INVASIVE LOCATION;  Service: Cardiovascular;  Laterality: N/A;    CLEFT PALATE REPAIR      FOOT ARTHROPLASTY Left 2020    SINUS SURGERY      TONSILLECTOMY         Family History   Problem Relation Age of Onset    No Known Problems Mother     No Known Problems Father     No Known Problems Brother        Social History     Socioeconomic History    Marital  status: Single   Tobacco Use    Smoking status: Every Day     Current packs/day: 0.50     Types: Cigarettes     Passive exposure: Current    Smokeless tobacco: Never   Vaping Use    Vaping status: Never Used   Substance and Sexual Activity    Alcohol use: Yes     Comment: 5 beers every other week    Drug use: Never    Sexual activity: Defer           Objective   Physical Exam  Alert [place] Coma Scale 15   HEENT: Pupils equal and reactive to light. Conjunctivae are not injected. Normal tympanic membranes. Oropharynx and nares are normal.   Neck: Supple. Midline trachea. No JVD. No goiter.   Chest: Clear and equal breath sounds bilaterally, regular rate and rhythm without murmur or rub.   Abdomen: Positive bowel sounds, nontender, nondistended. No rebound or peritoneal signs. No CVA tenderness.  There is no inguinal lymphadenopathy no scrotal tenderness is identified bilaterally descended circumcised male  Extremities no clubbing. cyanosis or edema. Motor sensory exam is normal. The full range of motion is intact contusion and tenderness is noted.  It appears more consistent by palpation to postprocedural hematoma than pseudoaneurysm.  No bruit or thrill.  Distal pulses are symmetrical   Skin: Warm and dry, no rashes or petechia.   Lymphatic: No regional lymphadenopathy. No calf pain, swelling or Homans sign    Procedures           ED Course                                   Labs Reviewed - No data to display  Medications   oxyCODONE (ROXICODONE) immediate release tablet 5 mg (5 mg Oral Given 8/28/24 2231)   HYDROmorphone (DILAUDID) injection 1 mg (1 mg Intramuscular Given 8/28/24 2022)   ondansetron ODT (ZOFRAN-ODT) disintegrating tablet 4 mg (4 mg Oral Given 8/28/24 2024)     US Nonvascular Extremity Limited    Result Date: 8/28/2024  Impression: 1.Nonspecific fluid collection in the area of interest that based on history might reflect postprocedural seroma Electronically Signed: Wilmar Zambrano MD  8/28/2024 9:31 PM  EDT  Workstation ID: [id number]             Medical Decision Making  She will be covered with oxycodone for pain.  He will also be placed on diclofenac as an anti-inflammatory measure.  The patient is encouraged to continue his postoperative instructions and observe for any evidence of infection    Amount and/or Complexity of Data Reviewed  Independent Historian: spouse  Radiology: ordered and independent interpretation performed.    Risk  OTC drugs.  Prescription drug management.  Parenteral controlled substances.        Final diagnoses:   Postprocedural hematoma of a circulatory system organ or structure following a cardiac catheterization       ED Disposition  ED Disposition       ED Disposition   Discharge    Condition   Stable    Comment   --               No follow-up provider specified.       Medication List        New Prescriptions      diclofenac 50 MG EC tablet  Commonly known as: VOLTAREN  Take 1 tablet by mouth 2 (Two) Times a Day.     oxyCODONE-acetaminophen 5-325 MG per tablet  Commonly known as: PERCOCET  One half or 1 p.o. every 6 hours as needed pain               Where to Get Your Medications        These medications were sent to [facility]/pharmacy #[id number] - [facility] [place] - [address] [place] [address] [phone] [place] [phone]   [address] [facility] [place] [address]      Phone: [phone]   diclofenac 50 MG EC tablet  oxyCODONE-acetaminophen 5-325 MG per tablet            Justin Cordova MD  08/28/24 1735

## 2024-08-29 NOTE — LETTER
August 29, 2024     Patient: Caleb Vargas   YOB: 1985   Date of Visit: 8/29/2024       To Whom It May Concern:    It is my medical opinion that Caleb Vargas may return to work in on 9/9/24.          Sincerely,        Arturo Ac MD    CC: No Recipients

## 2024-09-03 ENCOUNTER — TELEPHONE (OUTPATIENT)
Dept: FAMILY MEDICINE CLINIC | Facility: CLINIC | Age: 39
End: 2024-09-03

## 2024-09-03 NOTE — TELEPHONE ENCOUNTER
Caller: Caleb Vargas    Relationship: Self    Best call back number: [phone]     What was the call regarding: CHECKING BACK IN ON HIS SHORT TERM DISABILITY DISCUSSED WITH DR. GONSALVES AT LAST APPOINTMENT    HE WILL FAX IT OVER AGAIN IN CASE IT WASN'T RECEIVED    HUB CONFIRMED FAX : [phone]

## 2024-09-05 ENCOUNTER — TELEPHONE (OUTPATIENT)
Dept: FAMILY MEDICINE CLINIC | Facility: CLINIC | Age: 39
End: 2024-09-05
Payer: COMMERCIAL

## 2024-09-05 NOTE — TELEPHONE ENCOUNTER
PT CONFIRMING DATES FOR PAPERWORK: RETURN TO WORK ON 9/9. PAPERS NEED SENT TODAY SO HE CAN BE PAID FOR TIME OFF WORK.

## 2024-09-05 NOTE — TELEPHONE ENCOUNTER
PATIENT STATES THAT HE NEEDS THIS SENT ASAP SO HE CAN GET PAID. PLEASE ADVISE WHEN THIS HAS BEEN TAKEN CARE OF.

## 2024-09-06 NOTE — TELEPHONE ENCOUNTER
Spoke with pt today. Emailed paperwork to Dr. Ac this morning and will fax when completed. Will call pt after faxed.

## 2024-09-09 ENCOUNTER — TELEPHONE (OUTPATIENT)
Dept: FAMILY MEDICINE CLINIC | Facility: CLINIC | Age: 39
End: 2024-09-09
Payer: COMMERCIAL

## 2024-09-09 NOTE — TELEPHONE ENCOUNTER
Called pt and notified him that I have not received any additional paperwork on him as of now and he v/u.

## 2024-09-09 NOTE — TELEPHONE ENCOUNTER
PT STATES ADDL FORMS WERE SENT TO PCP FROM [employer]. PT NEEDS PAPERWORK COMPLETED TO BE PAID FOR WORK DAYS ALREADY MISSED.

## 2024-09-12 ENCOUNTER — TELEPHONE (OUTPATIENT)
Dept: CASE MANAGEMENT | Facility: CLINIC | Age: 39
End: 2024-09-12
Payer: COMMERCIAL

## 2024-09-12 ENCOUNTER — TELEPHONE (OUTPATIENT)
Dept: FAMILY MEDICINE CLINIC | Facility: CLINIC | Age: 39
End: 2024-09-12
Payer: COMMERCIAL

## 2024-09-12 NOTE — TELEPHONE ENCOUNTER
PT INQUIRING STATUS OF PAPERWORK FROM PAYER. NOTHING IN CHART RECEIVED SINCE 9/6/2024. PLEASE CALL PT WHEN PAPERWORK RECEIVED/COMPLETED.

## 2024-09-16 ENCOUNTER — OFFICE VISIT (OUTPATIENT)
Dept: CARDIOLOGY | Facility: CLINIC | Age: 39
End: 2024-09-16
Payer: COMMERCIAL

## 2024-09-16 VITALS
WEIGHT: 279 LBS | SYSTOLIC BLOOD PRESSURE: 124 MMHG | BODY MASS INDEX: 39.06 KG/M2 | HEIGHT: 71 IN | DIASTOLIC BLOOD PRESSURE: 75 MMHG | OXYGEN SATURATION: 95 % | HEART RATE: 78 BPM

## 2024-09-16 DIAGNOSIS — J41.0 SIMPLE CHRONIC BRONCHITIS: Chronic | ICD-10-CM

## 2024-09-16 DIAGNOSIS — R07.89 CHEST DISCOMFORT: Primary | ICD-10-CM

## 2024-09-16 PROCEDURE — 99214 OFFICE O/P EST MOD 30 MIN: CPT | Performed by: INTERNAL MEDICINE

## 2024-09-28 NOTE — PROGRESS NOTES
"    Subjective:     Encounter Date:09/30/2024        Patient ID: Caleb Vargas [id number]     Chief Complaint:  palpitations    History of Present Illness:  Caleb Vargas is a 39 y.o. male patient of Dr. Yoder with a history of hyperlipidemia, obstructive sleep apnea and tobacco abuse who presents to the office complaining of palpitations. The patient reports a fluttering in his chest that is associated with chest pain, shortness of breath and fatigue. He states they randomly occur while at rest or while exerting himself. He states he is compliant with his nocturnal BiPAP. He reports \"very little\" caffeine intake. He states the episodes will last up to 2 days, but he hasn't had an episode for the past 1 week. He recently had a stress test, echocardiogram and cardiac catheterization. His echocardiogram showed structurally and functionally normal cardiac valves except for mild tricuspid and minimal pulmonic regurgitation, mild left atrial enlargement, left ventricular size and contractility is normal with ejection fraction of 60%, grade 2 left ventricular diastolic dysfunction, and mild pulmonary hypertension. His cardiac catheterization showed normal coronary arteries. He states he quit taking the blood pressure medication he was started on because his blood pressure was getting too low. He also quit taking his statin. He is trying to quit smoking.         Review of systems:  Review of Systems   Constitutional: Positive for malaise/fatigue.   Cardiovascular:  Positive for chest pain and palpitations. Negative for dyspnea on exertion and leg swelling.   Respiratory:  Positive for shortness of breath. Negative for cough.    Gastrointestinal:  Negative for abdominal pain, nausea and vomiting.   Neurological:  Negative for dizziness, focal weakness, headaches, light-headedness and numbness.   All other systems reviewed and are negative.        The following portions of the patient's history were reviewed and " updated as appropriate: allergies, current medications, past family history, past medical history, past social history, past surgical history and problem list.    Past Medical History:  Past Medical History:   Diagnosis Date    Abdominal pain     Agitation     Allergies     Apnea     Change in weight     Chest congestion     Choking     Cough     GERD (gastroesophageal reflux disease)     High blood pressure     Obesity     Sleep disturbance     Snoring     Wheezing        Past Surgical History:  Past Surgical History:   Procedure Laterality Date    CARDIAC CATHETERIZATION  08/27/2024    NORMAL LEFT AND RIGHT HEART CATH WITH DR LANE    CARDIAC CATHETERIZATION N/A 8/27/2024    Procedure: Left Heart Cath and coronary angiogram;  Surgeon: Theo Lane MD;  Location: [facility] CATH INVASIVE LOCATION;  Service: Cardiovascular;  Laterality: N/A;    CLEFT PALATE REPAIR      FOOT ARTHROPLASTY Left 2020    SINUS SURGERY      TONSILLECTOMY          Allergies:  Allergies   Allergen Reactions    Atorvastatin Myalgia    Latex Hives    Tamiflu [Oseltamivir Phosphate] Nausea And Vomiting       Current Meds:   No current outpatient medications on file.    Social History:   Social History     Tobacco Use    Smoking status: Every Day     Current packs/day: 0.50     Average packs/day: 0.5 packs/day for 26.7 years (13.4 ttl pk-yrs)     Types: Cigarettes     Start date: 1998     Passive exposure: Current    Smokeless tobacco: Never   Substance Use Topics    Alcohol use: Yes     Comment: 5 beers every other week        Family History:  Family History   Problem Relation Age of Onset    No Known Problems Mother     No Known Problems Father     No Known Problems Brother                  Objective:       Physical Exam:  Vitals reviewed.   Constitutional:       Appearance: Well-developed and well-groomed. Morbidly obese.   Eyes:      Conjunctiva/sclera: Conjunctivae normal.      Pupils: Pupils are equal, round, and reactive to light.  "  HENT:      Head: Normocephalic and atraumatic.    Mouth/Throat:      Mouth: Mucous membranes are moist.      Pharynx: Oropharynx is clear.   Pulmonary:      Effort: Pulmonary effort is normal.      Breath sounds: Normal breath sounds.   Cardiovascular:      PMI at left midclavicular line. Normal rate. Regular rhythm.   Pulses:     Intact distal pulses.   Edema:     Peripheral edema absent.   Abdominal:      General: Bowel sounds are normal.      Palpations: Abdomen is soft.   Musculoskeletal: Normal range of motion.      Cervical back: Normal range of motion. Skin:     General: Skin is warm and dry.   Neurological:      Mental Status: Alert and oriented to person, place, and time.   Psychiatric:         Mood and Affect: Mood normal.         Behavior: Behavior normal.         Vital signs:  /77 (BP Location: Left arm, Patient Position: Sitting, Cuff Size: Large Adult)   Pulse 72   Ht 180.3 cm (71\")   Wt 127 kg (280 lb)   SpO2 97%   BMI 39.05 kg/m²       Recent labs reviewed:    CBC        BMP        CMP       Creatinine Clearance  CrCl cannot be calculated (Patient's most recent lab result is older than the maximum 30 days allowed.).    BNP        TROPONIN        CoAg          Cardiology results reviewed:      Stress test  Results for orders placed during the hospital encounter of 08/25/24    Stress Test With Myocardial Perfusion One Day    Interpretation Summary    Low risk for ischemic heart disease.    Indications  Chest pain    This study was performed under the direct supervision of Nivia BAXTER.    Resting ECG  Sinus rhythm    Patient exercised for 9 minutes using the Rikki protocol.  The maximum heart rate achieved was 155 and maximum systolic blood pressure 185.  Patient did not have any chest discomfort ST-T wave abnormalities or ectopy.    Cardiolite was used as an imaging agent.    Cardiolite images showed mild posterolateral and anterior decreased radionuclide uptake with partial " redistribution.    Gated SPECT images revealed normal left ventricular size and contractility with ejection fraction of 67%.    Impression  ========    Good exercise tolerance.    Cardiolite images showed mild posterolateral and anterior decreased radionuclide uptake with partial redistribution suggestive of infarction and associated ischemia..    Gated SPECT images revealed normal left ventricular size and contractility with ejection fraction of 67%.      Echocardiogram  Results for orders placed during the hospital encounter of 08/25/24    Adult Transthoracic Echo Complete W/ Cont if Necessary Per Protocol    Interpretation Summary    Left ventricular ejection fraction appears to be 56 - 60%.    Estimated right ventricular systolic pressure from tricuspid regurgitation is mildly elevated (35-45 mmHg).    Indications  Chest pain  Palpitations    Technically satisfactory study.  Mitral valve is structurally normal.  Tricuspid valve is structurally normal.  Mild tricuspid regurgitation is present.  Aortic valve is structurally normal.  Pulmonic valve could not be well visualized.  Minimal pulmonic regurgitation is present.  Mild pulmonary hypertension is present.  Left atrium is mildly enlarged.  Right atrium is normal in size.  Left ventricle is normal in size and contractility with ejection fraction of 60%.  Grade 2 left ventricular diastolic dysfunction is present.  Right ventricle is normal in size and contractility with TAPSE of 2.03 cm.  Atrial septum is intact.  Aorta is normal.  No pericardial effusion or intracardiac thrombus is seen.    Impression  Structurally and functionally normal cardiac valves except for mild tricuspid and minimal pulmonic regurgitation.  Mild left atrial enlargement..  Left ventricular size and contractility is normal with ejection fraction of 60%.  Grade 2 left ventricular diastolic dysfunction is present.  Mild pulmonary hypertension is present.  (38 mmHg.)      Cardiac  catheterization  Results for orders placed during the hospital encounter of 08/25/24    Cardiac Catheterization/Vascular Study    Conclusion  CARDIAC CATHETERIZATION REPORT    DATE OF PROCEDURE:  8/27/2024    INDICATION FOR PROCEDURE:  Chest discomfort  Abnormal stress nuclear test.  Mild elevation of pulmonary artery pressure (echocardiogram)    PROCEDURE PERFORMED:  Right heart catheterization  Left heart catheterization, coronary angiography, left ventriculography    @@  Moderate conscious sedation was utilized with intravenous Versed and fentanyl administered by registered nurse with continuous ECG, pulse oximetry and hemodynamic monitoring supervised by myself throughout the entire procedure.  Conscious sedation time   30 minutes    I was present with the patient for the duration of moderate sedation and supervised staff who had no other duties and monitored the patient for the entire procedure.    @@  PROCEDURE COMMENTS:    Under usual sterile precautions and 1% lidocaine filtration right femoral vein and femoral artery were percutaneously punctured and a 7 and 5 German vascular sheaths were respectively inserted.  Maurice-Gallito catheter was used to measure right-sided pressures pulmonary capital wedge pressure.  Maurice-Gallito catheter was removed and subsequently left and right coronary arteriography was performed followed by left ventricular angiogram.  Hemostasis was obtained and patient was returned to the room with intact pulses.  No complications were noted.  Patient tolerated the procedure well.    FINDINGS:    1. HEMODYNAMICS:  Left ventricle end-diastolic pressure is normal.  No gradient was noted across the aortic valve.  Mean right atrial pressure 10 right ventricle 30/5 pulmonary artery 36/9 mean pulmonary artery 24 pulmonary capital wedge pressure is 16.      2. LEFT VENTRICULOGRAPHY:  Left ventricular size and contractility is normal with ejection fraction of 60%.  No mitral regurgitation was  "seen.      3. CORONARY ANGIOGRAPHY:  Left main coronary artery is normal.  Left anterior descending artery is normal.  Circumflex coronary artery is a codominant vessel and is normal.  Right coronary artery is a codominant vessel and is normal.      SUMMARY:  No evidence for pulmonary hypertension is present.  Left ventricular size and contractility is normal with ejection fraction of 60%.  No mitral regurgitation was seen.    Left main coronary artery is normal.  Left anterior descending artery is normal.  Circumflex coronary artery is a codominant vessel and is normal.  Right coronary artery is a codominant vessel and is normal.    RECOMMENDATIONS:  Noncardiac workup               Assessment:         Diagnoses and all orders for this visit:    1. Palpitations (Primary)  -     Holter Monitor - 72 Hour Up To 15 Days; Future    2. REGGIE treated with BiPAP  Overview:  Overview:   Auto BiPAP-5/20 pressures      3. Obesity (BMI 30-39.9)    4. Other hyperlipidemia    5. Tobacco dependence due to cigarettes                Plan:       Palpitations  Etiology unclear  Episodes are sporadic   Patient reports \"very little\" caffeine intake  Recent echocardiogram showed preserved LVEF with grade 2 diastolic dysfunction  Obtain 14 day Holter monitor     REGGIE treated with BiPAP  Patient reports compliance with BiPAP     Obesity (BMI 30-39.9)  BMI 39.05  Lifestyle modifications recommended     Other hyperlipidemia  Recent   Patient states his PCP took him off his statin  Goal LDL <100  We discussed his goal LDL and lifestyle modifications  He was advised to have his PCP recheck a lipid panel after 3 months of lifestyle modifications.     Tobacco dependence due to cigarettes  Patient states he is trying to quit.  Smoking cessation recommended      Electronically signed by ELIEL James, 09/30/24, 11:41 AM EDT.         "

## 2024-09-30 ENCOUNTER — OFFICE VISIT (OUTPATIENT)
Dept: CARDIOLOGY | Facility: CLINIC | Age: 39
End: 2024-09-30
Payer: COMMERCIAL

## 2024-09-30 VITALS
WEIGHT: 280 LBS | SYSTOLIC BLOOD PRESSURE: 137 MMHG | HEIGHT: 71 IN | BODY MASS INDEX: 39.2 KG/M2 | OXYGEN SATURATION: 97 % | DIASTOLIC BLOOD PRESSURE: 77 MMHG | HEART RATE: 72 BPM

## 2024-09-30 DIAGNOSIS — F17.210 TOBACCO DEPENDENCE DUE TO CIGARETTES: Chronic | ICD-10-CM

## 2024-09-30 DIAGNOSIS — E78.49 OTHER HYPERLIPIDEMIA: Chronic | ICD-10-CM

## 2024-09-30 DIAGNOSIS — G47.33 OSA TREATED WITH BIPAP: Chronic | ICD-10-CM

## 2024-09-30 DIAGNOSIS — E66.9 OBESITY (BMI 30-39.9): Chronic | ICD-10-CM

## 2024-09-30 DIAGNOSIS — R00.2 PALPITATIONS: Primary | ICD-10-CM

## 2024-09-30 PROCEDURE — 99214 OFFICE O/P EST MOD 30 MIN: CPT | Performed by: NURSE PRACTITIONER

## 2024-10-07 PROBLEM — R94.39 ABNORMAL NUCLEAR STRESS TEST: Status: RESOLVED | Noted: 2024-08-25 | Resolved: 2024-10-07

## 2024-10-07 PROBLEM — K57.30 DIVERTICULOSIS OF LARGE INTESTINE WITHOUT PERFORATION OR ABSCESS WITHOUT BLEEDING: Status: RESOLVED | Noted: 2022-07-08 | Resolved: 2024-10-07

## 2024-10-07 PROBLEM — R55 NEAR SYNCOPE: Status: RESOLVED | Noted: 2022-12-07 | Resolved: 2024-10-07

## 2024-10-07 PROBLEM — J41.0 SIMPLE CHRONIC BRONCHITIS: Chronic | Status: RESOLVED | Noted: 2020-11-20 | Resolved: 2024-10-07

## 2024-10-07 PROBLEM — K21.00 GASTRO-ESOPHAGEAL REFLUX DISEASE WITH ESOPHAGITIS: Status: ACTIVE | Noted: 2018-03-15

## 2024-10-07 PROBLEM — S86.119A RUPTURE OF POSTERIOR TIBIALIS TENDON: Status: RESOLVED | Noted: 2024-03-27 | Resolved: 2024-10-07

## 2024-10-07 PROBLEM — E78.5 HYPERLIPIDEMIA: Status: ACTIVE | Noted: 2020-11-20

## 2024-10-07 PROBLEM — K44.9 DIAPHRAGMATIC HERNIA WITHOUT OBSTRUCTION OR GANGRENE: Status: RESOLVED | Noted: 2018-03-15 | Resolved: 2024-10-07

## 2024-10-07 PROBLEM — K63.5 POLYP OF COLON: Status: RESOLVED | Noted: 2022-07-08 | Resolved: 2024-10-07

## 2024-10-07 PROBLEM — M76.829 TIBIALIS POSTERIOR TENDINITIS: Status: RESOLVED | Noted: 2024-03-27 | Resolved: 2024-10-07

## 2024-10-07 PROBLEM — K57.92 DIVERTICULITIS: Status: RESOLVED | Noted: 2022-06-01 | Resolved: 2024-10-07

## 2024-10-07 PROBLEM — K64.1 GRADE II HEMORRHOIDS: Status: RESOLVED | Noted: 2022-07-08 | Resolved: 2024-10-07

## 2024-10-07 PROBLEM — E78.00 PURE HYPERCHOLESTEROLEMIA: Status: ACTIVE | Noted: 2024-10-07

## 2024-10-07 PROBLEM — R13.10 DYSPHAGIA: Status: RESOLVED | Noted: 2024-03-27 | Resolved: 2024-10-07

## 2024-10-07 PROBLEM — M40.204 KYPHOSIS OF THORACIC REGION: Status: RESOLVED | Noted: 2023-10-19 | Resolved: 2024-10-07

## 2024-10-07 PROBLEM — K57.32 DIVERTICULITIS OF SIGMOID COLON: Status: RESOLVED | Noted: 2024-03-27 | Resolved: 2024-10-07

## 2024-10-07 PROBLEM — M72.2 PLANTAR FASCIITIS: Status: RESOLVED | Noted: 2022-11-07 | Resolved: 2024-10-07

## 2024-10-07 PROBLEM — I10 HYPERTENSION: Status: ACTIVE | Noted: 2021-06-24

## 2024-10-07 PROBLEM — R74.8 ELEVATED LIVER ENZYMES: Status: RESOLVED | Noted: 2024-03-27 | Resolved: 2024-10-07

## 2024-11-01 ENCOUNTER — TELEPHONE (OUTPATIENT)
Dept: CARDIOLOGY | Facility: CLINIC | Age: 39
End: 2024-11-01
Payer: COMMERCIAL

## 2024-11-01 NOTE — TELEPHONE ENCOUNTER
Spoke with patient - advised holter results were ok and he can cancel 11/8/2024 apt   Patient states he would like to cancel and will call back to schedule regular follow up later when he is off work.   Understood     Please cancel 11/8/2024 apt.   Thanks!

## 2024-11-01 NOTE — TELEPHONE ENCOUNTER
Caller: Caleb Vargas    Relationship: Self    Best call back number: [phone]     Caller requesting test results: PATIENT    What test was performed: HOLTER    When was the test performed: 10.16.24    Where was the test performed:     Additional notes: PT CALLING TO SEE ABOUT UPCOMING APPT - PT SCHEDULED FOR 1 MONTH FU AND HAD TWO WEEK HOLTER SENT IN - PT ASKING IF RESULTS CAN BE GONE OVER AS HE WORKS AND IT WOULD BE EASIER THAN COMING IN -

## 2024-12-23 ENCOUNTER — OFFICE VISIT (OUTPATIENT)
Dept: FAMILY MEDICINE CLINIC | Facility: CLINIC | Age: 39
End: 2024-12-23
Payer: COMMERCIAL

## 2024-12-23 VITALS
SYSTOLIC BLOOD PRESSURE: 144 MMHG | DIASTOLIC BLOOD PRESSURE: 102 MMHG | BODY MASS INDEX: 38.39 KG/M2 | HEIGHT: 71 IN | OXYGEN SATURATION: 95 % | HEART RATE: 94 BPM | WEIGHT: 274.2 LBS

## 2024-12-23 DIAGNOSIS — R53.82 CHRONIC FATIGUE: ICD-10-CM

## 2024-12-23 DIAGNOSIS — F17.210 TOBACCO DEPENDENCE DUE TO CIGARETTES: Chronic | ICD-10-CM

## 2024-12-23 DIAGNOSIS — I10 PRIMARY HYPERTENSION: ICD-10-CM

## 2024-12-23 DIAGNOSIS — Z00.00 WELLNESS EXAMINATION: Primary | ICD-10-CM

## 2024-12-23 PROBLEM — R10.30 LOWER ABDOMINAL PAIN: Status: RESOLVED | Noted: 2022-05-27 | Resolved: 2024-12-23

## 2024-12-23 PROBLEM — R07.89 CHEST DISCOMFORT: Status: RESOLVED | Noted: 2024-08-25 | Resolved: 2024-12-23

## 2024-12-23 PROBLEM — R07.9 CHEST PAIN: Status: RESOLVED | Noted: 2020-11-20 | Resolved: 2024-12-23

## 2024-12-23 PROCEDURE — 99214 OFFICE O/P EST MOD 30 MIN: CPT | Performed by: STUDENT IN AN ORGANIZED HEALTH CARE EDUCATION/TRAINING PROGRAM

## 2024-12-23 PROCEDURE — 99396 PREV VISIT EST AGE 40-64: CPT | Performed by: STUDENT IN AN ORGANIZED HEALTH CARE EDUCATION/TRAINING PROGRAM

## 2025-01-02 ENCOUNTER — LAB (OUTPATIENT)
Dept: LAB | Facility: HOSPITAL | Age: 40
End: 2025-01-02
Payer: COMMERCIAL

## 2025-01-02 DIAGNOSIS — I10 PRIMARY HYPERTENSION: ICD-10-CM

## 2025-01-02 DIAGNOSIS — R53.82 CHRONIC FATIGUE: ICD-10-CM

## 2025-01-02 LAB
ANION GAP SERPL CALCULATED.3IONS-SCNC: 9.9 MMOL/L (ref 5–15)
BUN SERPL-MCNC: 22 MG/DL (ref 6–20)
BUN/CREAT SERPL: 23.4 (ref 7–25)
CALCIUM SPEC-SCNC: 9.8 MG/DL (ref 8.6–10.5)
CHLORIDE SERPL-SCNC: 102 MMOL/L (ref 98–107)
CO2 SERPL-SCNC: 26.1 MMOL/L (ref 22–29)
CREAT SERPL-MCNC: 0.94 MG/DL (ref 0.76–1.27)
EGFRCR SERPLBLD CKD-EPI 2021: 104.4 ML/MIN/1.73
GLUCOSE SERPL-MCNC: 100 MG/DL (ref 65–99)
POTASSIUM SERPL-SCNC: 3.9 MMOL/L (ref 3.5–5.2)
SODIUM SERPL-SCNC: 138 MMOL/L (ref 136–145)
TSH SERPL DL<=0.05 MIU/L-ACNC: 1.86 UIU/ML (ref 0.27–4.2)

## 2025-01-02 PROCEDURE — 84403 ASSAY OF TOTAL TESTOSTERONE: CPT

## 2025-01-02 PROCEDURE — 84402 ASSAY OF FREE TESTOSTERONE: CPT

## 2025-01-02 PROCEDURE — 36415 COLL VENOUS BLD VENIPUNCTURE: CPT

## 2025-01-02 PROCEDURE — 84443 ASSAY THYROID STIM HORMONE: CPT

## 2025-01-02 PROCEDURE — 80048 BASIC METABOLIC PNL TOTAL CA: CPT

## 2025-01-05 LAB
TESTOST FREE SERPL-MCNC: 7.2 PG/ML (ref 6.8–21.5)
TESTOST SERPL-MCNC: 562 NG/DL (ref 264–916)

## 2025-01-16 ENCOUNTER — TELEPHONE (OUTPATIENT)
Dept: FAMILY MEDICINE CLINIC | Facility: CLINIC | Age: 40
End: 2025-01-16
Payer: COMMERCIAL

## 2025-01-16 ENCOUNTER — OFFICE (AMBULATORY)
Dept: URBAN - METROPOLITAN AREA CLINIC 64 | Facility: CLINIC | Age: 40
End: 2025-01-16
Payer: COMMERCIAL

## 2025-01-16 VITALS
SYSTOLIC BLOOD PRESSURE: 136 MMHG | DIASTOLIC BLOOD PRESSURE: 97 MMHG | HEART RATE: 75 BPM | HEIGHT: 71 IN | DIASTOLIC BLOOD PRESSURE: 90 MMHG | WEIGHT: 263 LBS | SYSTOLIC BLOOD PRESSURE: 128 MMHG

## 2025-01-16 DIAGNOSIS — K59.00 CONSTIPATION, UNSPECIFIED: ICD-10-CM

## 2025-01-16 DIAGNOSIS — R10.32 LEFT LOWER QUADRANT PAIN: ICD-10-CM

## 2025-01-16 PROCEDURE — 99213 OFFICE O/P EST LOW 20 MIN: CPT | Performed by: NURSE PRACTITIONER

## 2025-01-16 RX ORDER — DICYCLOMINE HYDROCHLORIDE 20 MG/1
80 TABLET ORAL
Qty: 120 | Refills: 12 | Status: ACTIVE
Start: 2025-01-16

## 2025-01-16 RX ORDER — SORBITOL SOLUTION 70 %
SOLUTION, ORAL MISCELLANEOUS
Qty: 100 | Refills: 0 | Status: ACTIVE
Start: 2025-01-16

## 2025-01-16 RX ORDER — SACCHAROMYCES BOULARDII 50 MG
500 CAPSULE ORAL
Qty: 60 | Refills: 11 | Status: ACTIVE
Start: 2025-01-16

## 2025-01-16 RX ORDER — CIPROFLOXACIN 500 MG/1
1000 TABLET, FILM COATED ORAL
Qty: 14 | Refills: 0 | Status: ACTIVE
Start: 2025-01-16

## 2025-01-16 RX ORDER — METRONIDAZOLE 500 MG/1
1500 TABLET, FILM COATED ORAL
Qty: 42 | Refills: 0 | Status: ACTIVE
Start: 2025-01-16

## 2025-01-16 NOTE — TELEPHONE ENCOUNTER
Caller: Pattie Vargas    Relationship: Self    Best call back number: [phone]   MAY LEAVE MESSAGE  What is the best time to reach you: ANY    Who are you requesting to speak with (clinical staff, provider,  specific staff member): CLINICAL    Do you know the name of the person who called: PATTIE    What was the call regarding:   PATIENT STATES HAD BLOOD WORK AROUND [place] AND HAS NOT HEARD OF THE RESULTS. PLEASE CALL

## 2025-03-05 ENCOUNTER — TELEPHONE (OUTPATIENT)
Dept: FAMILY MEDICINE CLINIC | Facility: CLINIC | Age: 40
End: 2025-03-05

## 2025-03-05 RX ORDER — BENZONATATE 100 MG/1
100 CAPSULE ORAL 3 TIMES DAILY PRN
Qty: 20 CAPSULE | Refills: 0 | Status: SHIPPED | OUTPATIENT
Start: 2025-03-05

## 2025-03-05 NOTE — TELEPHONE ENCOUNTER
Caller: Caleb Vargas    Relationship: Self    Best call back number: [phone]    What medication are you requesting: Z-MIKE, ANTIBIOTIC AND PAIN RELIEVER    What are your current symptoms: DISCOLORED MUCUS, NASAL DRAINAGE, WEAK FEELING, WHEEZING    How long have you been experiencing symptoms: FEW DAYS    Have you had these symptoms before:    [x] Yes  [] No    Have you been treated for these symptoms before:   [x] Yes  [] No    If a prescription is needed, what is your preferred pharmacy and phone number: [facility] #[id number] - [place], [place] [address] [phone] [facility] [phone]      Additional notes: STATED THAT THEY WENT TO URGENT CARE YESTERDAY, TESTED NEGATIVE FOR COVID AND FLU A & B. STATED THAT THEY WOULD LIKE TO KNOW IF SOMETHING CAN BE SENT IN TO HELP THEM AS THE MEDICATIONS THEY WERE GIVEN YESTERDAY ARE NOT HELPING AND THEY WEREN'T GIVEN ANY ANTIBIOTICS. PLEASE CALL AND ADVISE

## 2025-03-05 NOTE — TELEPHONE ENCOUNTER
Patient called back again to see what is being done about his message from 8:04am. I explained that Dr Mann has been seeing patients all morning and he usually reviews messages at lunch.

## 2025-04-28 ENCOUNTER — TELEPHONE (OUTPATIENT)
Dept: FAMILY MEDICINE CLINIC | Facility: CLINIC | Age: 40
End: 2025-04-28

## 2025-04-28 NOTE — TELEPHONE ENCOUNTER
Caller: Caleb Vargas    Relationship: Self    Best call back number: [phone]    What medication are you requesting:     What are your current symptoms: COUGHING UP DARK BROWN MUCUS, NO ENERGY       If a prescription is needed, what is your preferred pharmacy and phone number: [id number] - [place], [place] - [address] [phone] [facility] [phone] FX

## 2025-04-29 ENCOUNTER — OFFICE VISIT (OUTPATIENT)
Dept: FAMILY MEDICINE CLINIC | Facility: CLINIC | Age: 40
End: 2025-04-29
Payer: COMMERCIAL

## 2025-04-29 VITALS
OXYGEN SATURATION: 97 % | SYSTOLIC BLOOD PRESSURE: 130 MMHG | HEART RATE: 84 BPM | BODY MASS INDEX: 38.92 KG/M2 | DIASTOLIC BLOOD PRESSURE: 84 MMHG | WEIGHT: 278 LBS | HEIGHT: 71 IN

## 2025-04-29 DIAGNOSIS — J06.9 VIRAL URI WITH COUGH: Primary | ICD-10-CM

## 2025-04-29 PROCEDURE — 99213 OFFICE O/P EST LOW 20 MIN: CPT | Performed by: STUDENT IN AN ORGANIZED HEALTH CARE EDUCATION/TRAINING PROGRAM

## 2025-04-29 RX ORDER — DEXTROMETHORPHAN HYDROBROMIDE AND PROMETHAZINE HYDROCHLORIDE 15; 6.25 MG/5ML; MG/5ML
5 SYRUP ORAL 4 TIMES DAILY PRN
Qty: 240 ML | Refills: 0 | Status: SHIPPED | OUTPATIENT
Start: 2025-04-29

## 2025-04-29 NOTE — PROGRESS NOTES
"Chief Complaint  Chief Complaint   Patient presents with    Cough     4-5 days, drainage. Coughs up light Brown Mucus. Lungs hurt.     Fatigue       Subjective        Caleb Vargas is a 39 y.o. male who presents to [facility].  History of Present Illness    Caleb is a 39 year old male here for cough.    Cough  Productive cough that started 4 days ago  Additionally has had some nasal congestion, rhinorrhea, intermittent headache  Has used DayQuil without any relief  Has also been using [place] pot which does help temporarily  Fevers, nausea, vomiting  Went to urgent care for the symptoms yesterday.  At that time chest x-ray was performed.  He was diagnosed with a viral URI and allergic rhinitis.          Objective   /84   Pulse 84   Ht 180.3 cm (71\")   Wt 126 kg (278 lb)   SpO2 97%   BMI 38.77 kg/m²     Estimated body mass index is 38.77 kg/m² as calculated from the following:    Height as of this encounter: 180.3 cm (71\").    Weight as of this encounter: 126 kg (278 lb).     Physical Exam   GEN: In no acute distress, non toxic appearing  HEENT: EOMI. Mucous membranes moist. Oropharynx without erythema or exudate.  TM normal in appearance bilaterally.  CV: Regular rate and rhythm, no murmurs. No extremity edema.   RESP: Lungs clear to auscultation bilaterally.  No signs of respiratory distress on room air.  SKIN: No rashes  MSK: No joint erythema, deformity, or effusion.   NEURO: Alert and appropriate. CN 2-12 intact grossly.       PHQ-2 Depression Screening  Little interest or pleasure in doing things? Not at all   Feeling down, depressed, or hopeless? Not at all   PHQ-2 Total Score 0         Result Review :              Assessment and Plan     Diagnoses and all orders for this visit:    1. Viral URI with cough (Primary)  History and exam consistent with viral URI  Chest x-ray from 4/28 reviewed without any obvious infectious process.  Continue over-the-counter " medications for symptomatic relief  Prescription for promethazine-dextromethorphan syrup sent to pharmacy to help with cough  Advised to call/return to the office for new/worsening symptoms      Other orders  -     promethazine-dextromethorphan (PROMETHAZINE-DM) 6.25-15 MG/5ML syrup; Take 5 mL by mouth 4 (Four) Times a Day As Needed for Cough.  Dispense: 240 mL; Refill: 0            Follow Up     No follow-ups on file.

## 2025-06-03 ENCOUNTER — OFFICE VISIT (OUTPATIENT)
Dept: FAMILY MEDICINE CLINIC | Facility: CLINIC | Age: 40
End: 2025-06-03
Payer: COMMERCIAL

## 2025-06-03 VITALS
HEIGHT: 71 IN | BODY MASS INDEX: 39.17 KG/M2 | DIASTOLIC BLOOD PRESSURE: 84 MMHG | SYSTOLIC BLOOD PRESSURE: 142 MMHG | WEIGHT: 279.8 LBS | OXYGEN SATURATION: 96 % | HEART RATE: 86 BPM

## 2025-06-03 DIAGNOSIS — B96.89 ACUTE BACTERIAL SINUSITIS: Primary | ICD-10-CM

## 2025-06-03 DIAGNOSIS — J01.90 ACUTE BACTERIAL SINUSITIS: Primary | ICD-10-CM

## 2025-06-03 PROCEDURE — 99213 OFFICE O/P EST LOW 20 MIN: CPT | Performed by: STUDENT IN AN ORGANIZED HEALTH CARE EDUCATION/TRAINING PROGRAM

## 2025-06-03 RX ORDER — LEVOFLOXACIN 750 MG/1
750 TABLET, FILM COATED ORAL DAILY
Qty: 5 TABLET | Refills: 0 | Status: SHIPPED | OUTPATIENT
Start: 2025-06-03

## 2025-06-03 RX ORDER — DEXTROMETHORPHAN HYDROBROMIDE AND PROMETHAZINE HYDROCHLORIDE 15; 6.25 MG/5ML; MG/5ML
5 SYRUP ORAL 4 TIMES DAILY PRN
Qty: 240 ML | Refills: 0 | Status: SHIPPED | OUTPATIENT
Start: 2025-06-03

## 2025-06-03 RX ORDER — BENZONATATE 100 MG/1
200 CAPSULE ORAL 3 TIMES DAILY PRN
Qty: 60 CAPSULE | Refills: 0 | Status: SHIPPED | OUTPATIENT
Start: 2025-06-03

## 2025-06-03 NOTE — PROGRESS NOTES
"Chief Complaint  Chief Complaint   Patient presents with    Cough     For over a month     Nasal Congestion     For over a month        Subjective        Caleb Vargas is a 39 y.o. male who presents to [facility].  History of Present Illness    Caleb is a 39-year-old male here for cough and congestion.    Patient was seen at urgent care 4/28 for cough and congestion.  Chest x-ray done at that time and normal.  At that time he was diagnosed with viral URI.    He was seen in the office on 4/29 and given prescription for promethazine-DM.    Given persistent of symptoms he went to urgent care on 5/5 and was given IM Solu-Medrol, Medrol Dosepak, and doxycycline.  Patient states that he never started the doxycycline because he is outdoors all the time and read that you cannot go out in the sun while on doxycycline.    A week or 2 after that he went to an urgent care while in [place] and was given prescription for cefdinir.  He states that he finished the cefdinir but has not had any improvement in symptoms.    Patient is a chronic smoker but states that he has been trying to cut back significantly on smoking.    Since onset of symptoms over a month ago his cough has not improved and he continues to have significant nasal congestion and pressure over his ethmoid/maxillary areas.  Has been using [place] pot.  Was previously prescribed promethazine-DM and he states that was helpful at night.    Denies recent fever.            Objective   /84   Pulse 86   Ht 180.3 cm (71\")   Wt 127 kg (279 lb 12.8 oz)   SpO2 96%   BMI 39.02 kg/m²     Estimated body mass index is 39.02 kg/m² as calculated from the following:    Height as of this encounter: 180.3 cm (71\").    Weight as of this encounter: 127 kg (279 lb 12.8 oz).     Physical Exam   GEN: In no acute distress, non toxic appearing  HEENT: EOMI. Mucous membranes moist. Oropharynx without erythema or exudate.  TM normal in appearance " bilaterally.  No tenderness to palpation over the bilateral maxillary or frontal sinuses.  CV: Regular rate and rhythm, no murmurs. No extremity edema.   RESP: Mild end expiratory wheeze in the right lower lung field but otherwise clear to auscultation bilaterally.  No signs of respiratory distress on room air.  SKIN: No rashes  MSK: No joint erythema, deformity, or effusion.   NEURO: Alert and appropriate. CN 2-12 intact grossly.             Result Review :              Assessment and Plan     Diagnoses and all orders for this visit:    1. Acute bacterial sinusitis (Primary)  Patient with > 1 month of persistent cough and sinus congestion/pressure.  His exam is overall unremarkable.  He did recently finish a course of cefdinir without any improvement.  Will expand coverage with Levaquin 750 mg once daily x 5 days.    If no improvement will pursue CT Sinus to confirm diagnosis and look for other etiologies of his symptoms.    Prescription for promethazine-DM and Tessalon Perles sent to pharmacy for symptomatic relief.      Other orders  -     promethazine-dextromethorphan (PROMETHAZINE-DM) 6.25-15 MG/5ML syrup; Take 5 mL by mouth 4 (Four) Times a Day As Needed for Cough.  Dispense: 240 mL; Refill: 0  -     benzonatate (Tessalon Perles) 100 MG capsule; Take 2 capsules by mouth 3 (Three) Times a Day As Needed for Cough.  Dispense: 60 capsule; Refill: 0  -     levoFLOXacin (Levaquin) 750 MG tablet; Take 1 tablet by mouth Daily.  Dispense: 5 tablet; Refill: 0            Follow Up     No follow-ups on file.

## 2025-06-10 ENCOUNTER — TELEPHONE (OUTPATIENT)
Dept: FAMILY MEDICINE CLINIC | Facility: CLINIC | Age: 40
End: 2025-06-10

## 2025-06-10 DIAGNOSIS — J32.9 CHRONIC SINUSITIS, UNSPECIFIED LOCATION: Primary | ICD-10-CM

## 2025-06-10 NOTE — TELEPHONE ENCOUNTER
Caller: Caleb Vargas A    Relationship to patient: Self    Best call back number:   Telephone Information:   Mobile [phone]         Patient is needing: PATIENT CALLING TO ADVISE THAT THE LEVAQUIN IS NOT WORKING. HE IS READY FOR NEXT STEP OF A SCAN. PLEASE CALL BACK TO ADVISE.

## 2025-06-16 ENCOUNTER — HOSPITAL ENCOUNTER (OUTPATIENT)
Dept: CT IMAGING | Facility: HOSPITAL | Age: 40
Discharge: HOME | End: 2025-06-16
Admitting: STUDENT IN AN ORGANIZED HEALTH CARE EDUCATION/TRAINING PROGRAM
Payer: COMMERCIAL

## 2025-06-16 DIAGNOSIS — J32.9 CHRONIC SINUSITIS, UNSPECIFIED LOCATION: ICD-10-CM

## 2025-06-16 PROCEDURE — 70486 CT MAXILLOFACIAL W/O DYE: CPT

## 2025-06-17 ENCOUNTER — TELEPHONE (OUTPATIENT)
Dept: FAMILY MEDICINE CLINIC | Facility: CLINIC | Age: 40
End: 2025-06-17
Payer: COMMERCIAL

## 2025-06-17 NOTE — TELEPHONE ENCOUNTER
"Patient has had numbness and tingling in his hands for a while now. Said he has discussed this with you several times.    Patient is asking for a referral to a neurologist - \"the best in town\".  "

## 2025-06-20 DIAGNOSIS — J32.4 PANSINUSITIS, UNSPECIFIED CHRONICITY: Primary | ICD-10-CM

## 2025-06-20 DIAGNOSIS — R93.0 ABNORMAL CT SCAN, SINUS: ICD-10-CM

## 2025-06-25 ENCOUNTER — OFFICE VISIT (OUTPATIENT)
Dept: FAMILY MEDICINE CLINIC | Facility: CLINIC | Age: 40
End: 2025-06-25
Payer: COMMERCIAL

## 2025-06-25 VITALS
WEIGHT: 280 LBS | DIASTOLIC BLOOD PRESSURE: 74 MMHG | HEART RATE: 82 BPM | SYSTOLIC BLOOD PRESSURE: 142 MMHG | BODY MASS INDEX: 39.2 KG/M2 | OXYGEN SATURATION: 96 % | HEIGHT: 71 IN

## 2025-06-25 DIAGNOSIS — R20.0 BILATERAL HAND NUMBNESS: ICD-10-CM

## 2025-06-25 DIAGNOSIS — I10 PRIMARY HYPERTENSION: Primary | ICD-10-CM

## 2025-06-25 DIAGNOSIS — M54.6 CHRONIC THORACIC BACK PAIN, UNSPECIFIED BACK PAIN LATERALITY: ICD-10-CM

## 2025-06-25 DIAGNOSIS — G89.29 CHRONIC THORACIC BACK PAIN, UNSPECIFIED BACK PAIN LATERALITY: ICD-10-CM

## 2025-06-25 PROCEDURE — 99214 OFFICE O/P EST MOD 30 MIN: CPT | Performed by: STUDENT IN AN ORGANIZED HEALTH CARE EDUCATION/TRAINING PROGRAM

## 2025-06-25 RX ORDER — MUPIROCIN 2 %
OINTMENT (GRAM) TOPICAL
COMMUNITY
Start: 2025-06-23

## 2025-06-25 NOTE — PROGRESS NOTES
"Chief Complaint  Chief Complaint   Patient presents with    Primary Care Follow-Up     6 month     Numbness     Arms and hands and back       Subjective        Caleb Vargas is a 39 y.o. male who presents to [facility].  History of Present Illness    Caleb is a 39 year old male here for multiple concerns.       Hand numbness  Patient states 1-2 years ago he had a crush injury at work () when a plane wing was lowered onto his head suddenly  Since that time has struggled with lower neck/upper thoracic back pain.  Pain is located midline and on the left side.  Over time pain has gradually gotten worse and he has been having frequent episodes of numbness radiating from his neck to his bilateral arms/hands.  Numbness is triggered when he lays down in bed or with prolonged flexion/extension of his neck.        Hypertension  Checks blood pressure at home and typically 130s-lower 140s systolic  He states previously he was put on losartan 25 mg and had episodes of low blood pressure so this was discontinued          Objective   /74   Pulse 82   Ht 180.3 cm (71\")   Wt 127 kg (280 lb)   SpO2 96%   BMI 39.05 kg/m²     Estimated body mass index is 39.05 kg/m² as calculated from the following:    Height as of this encounter: 180.3 cm (71\").    Weight as of this encounter: 127 kg (280 lb).     Physical Exam   GEN: In no acute distress, non toxic appearing  HEENT: MMM. EOMI.   CV: No extremity edema.   RESP: No signs of respiratory distress.  SKIN: No rashes  MSK: No midline tenderness or step-off appreciated of the cervical or thoracic spine.  He does have mild to moderate tenderness over the left paraspinal musculature of the upper thoracic spine.  Spurling maneuver is positive on the left with reproduction of paresthesias down his left arm and into his hand.  Spurling maneuver is negative on the right.  Strength is +5/5 to bilateral upper " extremities/hands.  NEURO: Moves all extremities equally. Alert and appropriate             Result Review :    The following data was reviewed by: Usman Mann DO on 06/25/2025:    Data reviewed : Consultant notes cardiology office notes from 9/16/2024 and 9/30/2024.      Assessment and Plan     Diagnoses and all orders for this visit:    1. Primary hypertension (Primary)  Uncontrolled, chronic problem  Blood pressure is elevated today at 142/74 which is consistent with home blood pressure readings.    Given these findings, strongly recommend measures to help control blood pressure.  Patient would prefer to avoid medication if possible and wants to continue to make efforts to lose weight.    I did  him that the longer his blood pressure continues to run high his risk of heart disease/stroke does increase over time.    Patient to monitor blood pressure at home.  If blood pressure remains >140/90 recommend he call/message office to start low-dose antihypertensive medication.        2. Bilateral hand numbness  3. Chronic thoracic back pain      Patient presents with chronic lower neck/thoracic back pain that has gotten worse over time since crush injury 1-2 years ago.  He has also developed bilateral hand numbness that has increased in frequency since onset.    On exam he is found to have positive Spurling maneuver on the left.    Given these abnormalities there is high level of concern for myelopathy.  Will obtain MRI cervical and thoracic spine to evaluate this further to help determine next steps in treatment.  Patient is willing to undergo procedural intervention to address abnormalities.      -     MRI Cervical Spine Without Contrast; Future  -     MRI Thoracic Spine Without Contrast; Future       Problems: Moderate   Data: Moderate (review of external notes, ordering of each unique test)         Follow Up     No follow-ups on file.

## 2025-07-12 ENCOUNTER — HOSPITAL ENCOUNTER (EMERGENCY)
Facility: HOSPITAL | Age: 40
Discharge: HOME | End: 2025-07-12
Attending: EMERGENCY MEDICINE
Payer: COMMERCIAL

## 2025-07-12 ENCOUNTER — APPOINTMENT (OUTPATIENT)
Dept: GENERAL RADIOLOGY | Facility: HOSPITAL | Age: 40
End: 2025-07-12
Payer: COMMERCIAL

## 2025-07-12 VITALS
SYSTOLIC BLOOD PRESSURE: 136 MMHG | OXYGEN SATURATION: 92 % | HEIGHT: 71 IN | HEART RATE: 88 BPM | TEMPERATURE: 98.9 F | WEIGHT: 279.98 LBS | RESPIRATION RATE: 16 BRPM | BODY MASS INDEX: 39.2 KG/M2 | DIASTOLIC BLOOD PRESSURE: 80 MMHG

## 2025-07-12 DIAGNOSIS — M54.2 NECK PAIN: Primary | ICD-10-CM

## 2025-07-12 PROCEDURE — 99283 EMERGENCY DEPT VISIT LOW MDM: CPT

## 2025-07-12 PROCEDURE — 70360 X-RAY EXAM OF NECK: CPT

## 2025-07-12 RX ORDER — NAPROXEN 375 MG/1
375 TABLET ORAL 2 TIMES DAILY PRN
Qty: 14 TABLET | Refills: 0 | Status: SHIPPED | OUTPATIENT
Start: 2025-07-12

## 2025-07-12 NOTE — ED PROVIDER NOTES
Subjective   History of Present Illness  Patient is a 39-year-old male complaint of right-sided neck pain that developed over the past 24 hours.  He denies injury to throat cough congestion fever or other complaints      Review of Systems    Past Medical History:   Diagnosis Date    Abdominal pain     Agitation     Allergies     Apnea     Change in weight     Chest congestion     Choking     Cough     Diaphragmatic hernia without obstruction or gangrene 03/15/2018    Diverticulitis 06/01/2022    Diverticulitis of sigmoid colon 03/27/2024    Diverticulosis of large intestine without perforation or abscess without bleeding 07/08/2022    Dysphagia 03/27/2024    Elevated liver enzymes 03/27/2024    GERD (gastroesophageal reflux disease)     Grade II hemorrhoids 07/08/2022    High blood pressure     Kyphosis of thoracic region 10/19/2023    Near syncope 12/07/2022    Obesity     Plantar fasciitis 11/07/2022    Polyp of colon 07/08/2022    Rupture of posterior tibialis tendon 03/27/2024    Simple chronic bronchitis 11/20/2020    Sleep disturbance     Snoring     Tibialis posterior tendinitis 03/27/2024    Wheezing        Allergies   Allergen Reactions    Atorvastatin Myalgia    Latex Hives    Tamiflu [Oseltamivir Phosphate] Nausea And Vomiting       Past Surgical History:   Procedure Laterality Date    CARDIAC CATHETERIZATION  08/27/2024    NORMAL LEFT AND RIGHT HEART CATH WITH DR LANE    CARDIAC CATHETERIZATION N/A 8/27/2024    Procedure: Left Heart Cath and coronary angiogram;  Surgeon: Theo Lane MD;  [facility];  Service: Cardiovascular;  Laterality: N/A;    CLEFT PALATE REPAIR      FOOT ARTHROPLASTY Left 2020    SINUS SURGERY      TONSILLECTOMY         Family History   Problem Relation Age of Onset    No Known Problems Mother     No Known Problems Father     No Known Problems Brother        Social History     Socioeconomic History    Marital status: Single   Tobacco Use    Smoking  status: Every Day     Current packs/day: 0.20     Average packs/day: 0.5 packs/day for 27.5 years (13.3 ttl pk-yrs)     Types: Cigarettes     Start date: 1998     Passive exposure: Current    Smokeless tobacco: Never   Vaping Use    Vaping status: Never Used   Substance and Sexual Activity    Alcohol use: Yes     Comment: 5 beers every other week    Drug use: Never    Sexual activity: Defer           Objective   Physical Exam  HEENT exam shows TMs to be clear.  Oropharynx comers.  Neck has mild right sternocleidomastoid tenderness.  There is no palpable adenopathy.  He has no stridor.  Lungs are clear.  Procedures           ED Course      XR Neck Soft Tissue  Result Date: 7/12/2025  Impression: 1.No abnormal thickening of the retropharyngeal soft tissues. 2.No abnormal narrowing or deviation of the trachea. Electronically Signed: Chao Zamora MD  7/12/2025 4:36 PM EDT  Workstation ID: [id number]                                                     Medical Decision Making  Interpretation patient's lateral neck soft tissue x-ray shows no soft tissue swelling or other acute abnormality.  Patient be discharged with nonspecific neck pain.  Will be placed on Naprosyn.  Will see his MD for recheck as needed.    Amount and/or Complexity of Data Reviewed  Radiology: ordered and independent interpretation performed.    Risk  Prescription drug management.        Final diagnoses:   Neck pain       ED Disposition  ED Disposition       ED Disposition   Discharge    Condition   Stable    Comment   --               No follow-up provider specified.       Medication List        New Prescriptions      naproxen 375 MG tablet  Commonly known as: NAPROSYN  Take 1 tablet by mouth 2 (Two) Times a Day As Needed for Moderate Pain.               Where to Get Your Medications        Information about where to get these medications is not yet available    Ask your nurse or doctor about these medications  naproxen 375 MG tablet            Isael  MD Joe  07/12/25 [id number]

## 2025-07-19 ENCOUNTER — HOSPITAL ENCOUNTER (OUTPATIENT)
Dept: MRI IMAGING | Facility: HOSPITAL | Age: 40
Discharge: HOME | End: 2025-07-19
Payer: COMMERCIAL

## 2025-07-19 DIAGNOSIS — G89.29 CHRONIC THORACIC BACK PAIN, UNSPECIFIED BACK PAIN LATERALITY: ICD-10-CM

## 2025-07-19 DIAGNOSIS — M54.6 CHRONIC THORACIC BACK PAIN, UNSPECIFIED BACK PAIN LATERALITY: ICD-10-CM

## 2025-07-19 DIAGNOSIS — R20.0 BILATERAL HAND NUMBNESS: ICD-10-CM

## 2025-07-19 PROCEDURE — 72146 MRI CHEST SPINE W/O DYE: CPT

## 2025-07-19 PROCEDURE — 72141 MRI NECK SPINE W/O DYE: CPT

## 2025-07-21 DIAGNOSIS — G89.29 CHRONIC NECK PAIN: ICD-10-CM

## 2025-07-21 DIAGNOSIS — R20.0 HAND NUMBNESS: Primary | ICD-10-CM

## 2025-07-21 DIAGNOSIS — M48.02 FORAMINAL STENOSIS OF CERVICAL REGION: ICD-10-CM

## 2025-07-21 DIAGNOSIS — M54.2 CHRONIC NECK PAIN: ICD-10-CM

## (undated) DEVICE — CATH DIAG IMPULSE FL4 5F 100CM

## (undated) DEVICE — PINNACLE INTRODUCER SHEATH: Brand: PINNACLE

## (undated) DEVICE — ELECTRD DEFIB M/FUNC PROPADZ RADIOL 2PK

## (undated) DEVICE — SWAN-GANZ TRUE SIZE THERMODILUTION "S" TIP: Brand: SWAN-GANZ TRUE SIZE

## (undated) DEVICE — CATH DIAG IMPULSE PIG 5F 100CM

## (undated) DEVICE — SHT AIR TRANSFR COMFRT GLIDE LAT 40X80IN

## (undated) DEVICE — DGW .035 MC J3MM 150CM T H AMP: Brand: EMERALD

## (undated) DEVICE — NDL PERC 1PRT THNWALL W/BASEPLT 18G 7CM

## (undated) DEVICE — CATH DIAG IMPULSE FR4 5F 100CM

## (undated) DEVICE — PK TRY HEART CATH 50